# Patient Record
Sex: MALE | Race: WHITE | NOT HISPANIC OR LATINO | Employment: OTHER | URBAN - METROPOLITAN AREA
[De-identification: names, ages, dates, MRNs, and addresses within clinical notes are randomized per-mention and may not be internally consistent; named-entity substitution may affect disease eponyms.]

---

## 2020-12-24 ENCOUNTER — HOSPITAL ENCOUNTER (EMERGENCY)
Facility: HOSPITAL | Age: 60
Discharge: HOME | End: 2020-12-24
Attending: EMERGENCY MEDICINE

## 2020-12-24 VITALS
OXYGEN SATURATION: 95 % | TEMPERATURE: 99 F | BODY MASS INDEX: 24.25 KG/M2 | HEART RATE: 76 BPM | SYSTOLIC BLOOD PRESSURE: 184 MMHG | WEIGHT: 160 LBS | DIASTOLIC BLOOD PRESSURE: 94 MMHG | HEIGHT: 68 IN | RESPIRATION RATE: 18 BRPM

## 2020-12-24 DIAGNOSIS — S05.02XA ABRASION OF LEFT CORNEA, INITIAL ENCOUNTER: Primary | ICD-10-CM

## 2020-12-24 DIAGNOSIS — Z77.098 CHEMICAL EXPOSURE: ICD-10-CM

## 2020-12-24 DIAGNOSIS — I10 HYPERTENSION: ICD-10-CM

## 2020-12-24 DIAGNOSIS — T78.40XA ALLERGIC REACTION, INITIAL ENCOUNTER: ICD-10-CM

## 2020-12-24 DIAGNOSIS — S05.01XA ABRASION OF RIGHT CORNEA, INITIAL ENCOUNTER: ICD-10-CM

## 2020-12-24 DIAGNOSIS — H10.213 ACUTE CHEMICAL CONJUNCTIVITIS OF BOTH EYES: ICD-10-CM

## 2020-12-24 DIAGNOSIS — L03.90 CELLULITIS, UNSPECIFIED CELLULITIS SITE: ICD-10-CM

## 2020-12-24 DIAGNOSIS — Z91.89 HISTORY OF DRUG OVERDOSE: ICD-10-CM

## 2020-12-24 LAB
ALBUMIN SERPL-MCNC: 3.3 G/DL (ref 3.3–5.5)
ALP SERPL-CCNC: 67 U/L (ref 42–141)
BILIRUB SERPL-MCNC: 0.5 MG/DL (ref 0.2–1.6)
BUN SERPL-MCNC: 20 MG/DL (ref 7–22)
CALCIUM SERPL-MCNC: 9.6 MG/DL (ref 8–10.3)
CHLORIDE SERPL-SCNC: 106 MMOL/L (ref 98–108)
CREAT SERPL-MCNC: 1 MG/DL (ref 0.6–1.2)
GLUCOSE SERPL-MCNC: 127 MG/DL (ref 73–118)
POC ALT (SGPT): 32 U/L (ref 10–47)
POC AST (SGOT): 48 U/L (ref 11–38)
POC B-TYPE NATRIURETIC PEPTIDE: 75.3 PG/ML (ref 0–100)
POC CARDIAC TROPONIN I: 0.01 NG/ML
POC PTINR: 1 (ref 0.9–1.2)
POC PTWBT: 12.2 SEC (ref 9.7–14.3)
POC TCO2: 28 MMOL/L (ref 18–33)
POTASSIUM BLD-SCNC: 4 MMOL/L (ref 3.6–5.1)
PROTEIN, POC: 6.7 G/DL (ref 6.4–8.1)
SAMPLE: NORMAL
SAMPLE: NORMAL
SODIUM BLD-SCNC: 140 MMOL/L (ref 128–145)

## 2020-12-24 PROCEDURE — 96365 THER/PROPH/DIAG IV INF INIT: CPT | Mod: ER

## 2020-12-24 PROCEDURE — 93010 ELECTROCARDIOGRAM REPORT: CPT | Mod: ,,, | Performed by: INTERNAL MEDICINE

## 2020-12-24 PROCEDURE — 84484 ASSAY OF TROPONIN QUANT: CPT | Mod: ER

## 2020-12-24 PROCEDURE — 80053 COMPREHEN METABOLIC PANEL: CPT | Mod: ER

## 2020-12-24 PROCEDURE — 85025 COMPLETE CBC W/AUTO DIFF WBC: CPT | Mod: ER

## 2020-12-24 PROCEDURE — 85610 PROTHROMBIN TIME: CPT | Mod: ER

## 2020-12-24 PROCEDURE — 96375 TX/PRO/DX INJ NEW DRUG ADDON: CPT | Mod: ER

## 2020-12-24 PROCEDURE — 63600175 PHARM REV CODE 636 W HCPCS: Mod: ER | Performed by: EMERGENCY MEDICINE

## 2020-12-24 PROCEDURE — 99285 EMERGENCY DEPT VISIT HI MDM: CPT | Mod: 25,ER

## 2020-12-24 PROCEDURE — 25000003 PHARM REV CODE 250: Mod: ER | Performed by: EMERGENCY MEDICINE

## 2020-12-24 PROCEDURE — 93005 ELECTROCARDIOGRAM TRACING: CPT | Mod: ER

## 2020-12-24 PROCEDURE — 83880 ASSAY OF NATRIURETIC PEPTIDE: CPT | Mod: ER

## 2020-12-24 PROCEDURE — 90715 TDAP VACCINE 7 YRS/> IM: CPT | Mod: ER | Performed by: EMERGENCY MEDICINE

## 2020-12-24 PROCEDURE — 93010 EKG 12-LEAD: ICD-10-PCS | Mod: ,,, | Performed by: INTERNAL MEDICINE

## 2020-12-24 PROCEDURE — 90471 IMMUNIZATION ADMIN: CPT | Mod: ER | Performed by: EMERGENCY MEDICINE

## 2020-12-24 PROCEDURE — 25500020 PHARM REV CODE 255: Mod: ER | Performed by: EMERGENCY MEDICINE

## 2020-12-24 RX ORDER — FAMOTIDINE 10 MG/ML
20 INJECTION INTRAVENOUS
Status: COMPLETED | OUTPATIENT
Start: 2020-12-24 | End: 2020-12-24

## 2020-12-24 RX ORDER — IBUPROFEN 600 MG/1
600 TABLET ORAL EVERY 6 HOURS PRN
Qty: 20 TABLET | Refills: 0 | Status: ON HOLD | OUTPATIENT
Start: 2020-12-24 | End: 2021-03-15 | Stop reason: HOSPADM

## 2020-12-24 RX ORDER — PREDNISONE 20 MG/1
40 TABLET ORAL DAILY
Qty: 10 TABLET | Refills: 0 | Status: SHIPPED | OUTPATIENT
Start: 2020-12-24 | End: 2020-12-29

## 2020-12-24 RX ORDER — ERYTHROMYCIN 5 MG/G
OINTMENT OPHTHALMIC
Status: COMPLETED | OUTPATIENT
Start: 2020-12-24 | End: 2020-12-24

## 2020-12-24 RX ORDER — CLINDAMYCIN PHOSPHATE 150 MG/ML
600 INJECTION, SOLUTION INTRAVENOUS
Status: DISCONTINUED | OUTPATIENT
Start: 2020-12-24 | End: 2020-12-24

## 2020-12-24 RX ORDER — FAMOTIDINE 20 MG/1
20 TABLET, FILM COATED ORAL 2 TIMES DAILY
Qty: 20 TABLET | Refills: 0 | Status: SHIPPED | OUTPATIENT
Start: 2020-12-24 | End: 2023-08-07

## 2020-12-24 RX ORDER — DIPHENHYDRAMINE HCL 25 MG
25 CAPSULE ORAL EVERY 6 HOURS PRN
Qty: 20 CAPSULE | Refills: 0 | Status: ON HOLD | OUTPATIENT
Start: 2020-12-24 | End: 2021-03-15 | Stop reason: HOSPADM

## 2020-12-24 RX ORDER — ACETAMINOPHEN 500 MG
500 TABLET ORAL EVERY 6 HOURS PRN
Qty: 30 TABLET | Refills: 0 | Status: ON HOLD | OUTPATIENT
Start: 2020-12-24 | End: 2021-03-15 | Stop reason: HOSPADM

## 2020-12-24 RX ORDER — CLINDAMYCIN PHOSPHATE 600 MG/50ML
600 INJECTION, SOLUTION INTRAVENOUS
Status: COMPLETED | OUTPATIENT
Start: 2020-12-24 | End: 2020-12-24

## 2020-12-24 RX ORDER — PROPARACAINE HYDROCHLORIDE 5 MG/ML
2 SOLUTION/ DROPS OPHTHALMIC
Status: COMPLETED | OUTPATIENT
Start: 2020-12-24 | End: 2020-12-24

## 2020-12-24 RX ORDER — DIPHENHYDRAMINE HYDROCHLORIDE 50 MG/ML
25 INJECTION INTRAMUSCULAR; INTRAVENOUS
Status: COMPLETED | OUTPATIENT
Start: 2020-12-24 | End: 2020-12-24

## 2020-12-24 RX ORDER — METHYLPREDNISOLONE SOD SUCC 125 MG
125 VIAL (EA) INJECTION
Status: COMPLETED | OUTPATIENT
Start: 2020-12-24 | End: 2020-12-24

## 2020-12-24 RX ORDER — ERYTHROMYCIN 5 MG/G
OINTMENT OPHTHALMIC
Qty: 1 TUBE | Refills: 0 | Status: ON HOLD | OUTPATIENT
Start: 2020-12-24 | End: 2021-03-15 | Stop reason: HOSPADM

## 2020-12-24 RX ORDER — NALOXONE HYDROCHLORIDE 1 MG/ML
INJECTION INTRAMUSCULAR; INTRAVENOUS; SUBCUTANEOUS
Qty: 2 ML | Refills: 1 | Status: SHIPPED | OUTPATIENT
Start: 2020-12-24

## 2020-12-24 RX ORDER — CLINDAMYCIN HYDROCHLORIDE 150 MG/1
300 CAPSULE ORAL 4 TIMES DAILY
Qty: 80 CAPSULE | Refills: 0 | Status: SHIPPED | OUTPATIENT
Start: 2020-12-24 | End: 2021-01-03

## 2020-12-24 RX ADMIN — METHYLPREDNISOLONE SODIUM SUCCINATE 125 MG: 125 INJECTION, POWDER, FOR SOLUTION INTRAMUSCULAR; INTRAVENOUS at 12:12

## 2020-12-24 RX ADMIN — FLUORESCEIN SODIUM 1 EACH: 1 STRIP OPHTHALMIC at 02:12

## 2020-12-24 RX ADMIN — IOHEXOL 100 ML: 350 INJECTION, SOLUTION INTRAVENOUS at 01:12

## 2020-12-24 RX ADMIN — ERYTHROMYCIN 1 INCH: 5 OINTMENT OPHTHALMIC at 02:12

## 2020-12-24 RX ADMIN — DIPHENHYDRAMINE HYDROCHLORIDE 25 MG: 50 INJECTION INTRAMUSCULAR; INTRAVENOUS at 12:12

## 2020-12-24 RX ADMIN — CLOSTRIDIUM TETANI TOXOID ANTIGEN (FORMALDEHYDE INACTIVATED), CORYNEBACTERIUM DIPHTHERIAE TOXOID ANTIGEN (FORMALDEHYDE INACTIVATED), BORDETELLA PERTUSSIS TOXOID ANTIGEN (GLUTARALDEHYDE INACTIVATED), BORDETELLA PERTUSSIS FILAMENTOUS HEMAGGLUTININ ANTIGEN (FORMALDEHYDE INACTIVATED), BORDETELLA PERTUSSIS PERTACTIN ANTIGEN, AND BORDETELLA PERTUSSIS FIMBRIAE 2/3 ANTIGEN 0.5 ML: 5; 2; 2.5; 5; 3; 5 INJECTION, SUSPENSION INTRAMUSCULAR at 12:12

## 2020-12-24 RX ADMIN — FAMOTIDINE 20 MG: 10 INJECTION INTRAVENOUS at 12:12

## 2020-12-24 RX ADMIN — PROPARACAINE HYDROCHLORIDE 2 DROP: 5 SOLUTION/ DROPS OPHTHALMIC at 12:12

## 2020-12-24 RX ADMIN — CLINDAMYCIN IN 5 PERCENT DEXTROSE 600 MG: 12 INJECTION, SOLUTION INTRAVENOUS at 03:12

## 2020-12-24 NOTE — Clinical Note
"Vivek "Bonynereida Whitman was seen and treated in our emergency department on 12/24/2020.  He may return to work on 12/26/2020.       If you have any questions or concerns, please don't hesitate to call.      Linnea Lin, DO"

## 2020-12-24 NOTE — ED NOTES
Contacted poison control,  3 , states due to time frame of accident to now, refer to optomology/medical for treatment.

## 2020-12-24 NOTE — ED PROVIDER NOTES
Encounter Date: 12/24/2020    SCRIBE #1 NOTE: I, July Charles, am scribing for, and in the presence of,  Dr. Lin. I have scribed the following portions of the note - Other sections scribed: HPI, ROS, PE.       History     Chief Complaint   Patient presents with    Eye Problem     reports possible paint thinner in right eye 2 days ago, and right  eye swelling, woke this am to both eyes with severe swelling and irritation- left eye jswollen shut     Vivek Whitman is a 60 y.o. male who presents to the ED for evaluation of bilateral eye pain, facial swelling, and skin irritation for 2 days, worsening yesterday morning. Patient reports he accidentally splashed paint thinner into his eyes 2 days ago. Endorses blurred vision, facial pain, and facial swelling. Denies a hx of hypertension. Denies throat tightness, chest pain, or SOB. Tetanus is not UTD.    The history is provided by the patient. No  was used.     Review of patient's allergies indicates:  No Known Allergies  History reviewed. No pertinent past medical history.  Past Surgical History:   Procedure Laterality Date    HERNIA REPAIR       History reviewed. No pertinent family history.  Social History     Tobacco Use    Smoking status: Current Every Day Smoker     Packs/day: 1.00     Types: Cigarettes   Substance Use Topics    Alcohol use: Yes     Alcohol/week: 12.0 standard drinks     Types: 12 Cans of beer per week    Drug use: No     Review of Systems   Constitutional: Negative for chills and fever.   HENT: Positive for facial swelling (and pain). Negative for sore throat.    Eyes: Positive for pain (and swelling), redness, itching and visual disturbance.   Respiratory: Negative for shortness of breath.    Cardiovascular: Negative for chest pain.   Gastrointestinal: Negative for nausea.   Genitourinary: Negative for dysuria.   Musculoskeletal: Negative for back pain.   Skin: Negative for rash.   Neurological: Negative for weakness and  numbness.   Hematological: Does not bruise/bleed easily.   All other systems reviewed and are negative.      Physical Exam     Initial Vitals [12/24/20 1209]   BP Pulse Resp Temp SpO2   (!) 192/95 70 19 99.1 °F (37.3 °C) 96 %      MAP       --         Patient gave consent to have physical exam performed.    Physical Exam    Nursing note and vitals reviewed.  Constitutional: He appears well-developed and well-nourished.   HENT:   Head: Normocephalic and atraumatic.   Right Ear: External ear normal.   Left Ear: External ear normal.   Nose: Nose normal.   Mouth/Throat: Oropharynx is clear and moist.   Eyes: EOM are normal. Pupils are equal, round, and reactive to light. Right eye exhibits discharge. No foreign body present in the right eye. Left eye exhibits discharge. No foreign body present in the left eye. Right conjunctiva is injected. Left conjunctiva is injected.   Slit lamp exam:       The right eye shows fluorescein uptake.        The left eye shows fluorescein uptake.       Neck: Normal range of motion. Neck supple.   Cardiovascular: Normal rate, regular rhythm and normal heart sounds. Exam reveals no gallop and no friction rub.    No murmur heard.  Pulmonary/Chest: Breath sounds normal. No respiratory distress. He has no wheezes. He has no rhonchi. He has no rales.   Abdominal: Soft. Bowel sounds are normal. There is no abdominal tenderness. There is no rebound and no guarding.   Musculoskeletal: Normal range of motion. No tenderness or edema.   Neurological: He is alert and oriented to person, place, and time. He has normal strength. No cranial nerve deficit.   Skin: Skin is warm and dry. Capillary refill takes less than 2 seconds. No rash noted.   Psychiatric: He has a normal mood and affect. His behavior is normal.                 ED Course   Procedures  Labs Reviewed   POCT CMP - Abnormal; Notable for the following components:       Result Value    AST (SGOT), POC 48 (*)     POC Glucose 127 (*)     All  "other components within normal limits   TROPONIN ISTAT   POCT CBC   POCT CMP   POCT PROTIME-INR   POCT TROPONIN   POCT B-TYPE NATRIURETIC PEPTIDE (BNP)   POCT B-TYPE NATRIURETIC PEPTIDE (BNP)   ISTAT PROCEDURE         EKG Readings: (Independently Interpreted)   No STEMI. Rate of 72. Normal Sinus Rhythm. Normal Axis. Abnormal EKG. LVH Appreciated. QTc normal at 475. No prior EKG for comparison.       Imaging Results          X-Ray Chest PA And Lateral (Final result)  Result time 12/24/20 13:58:36    Final result by Lobo Tripathi MD (12/24/20 13:58:36)                 Impression:      No acute cardiopulmonary finding.      Electronically signed by: Lobo Tripathi MD  Date:    12/24/2020  Time:    13:58             Narrative:    EXAMINATION:  XR CHEST PA AND LATERAL    CLINICAL HISTORY:  Provided history is "Hypertension;  ".    TECHNIQUE:  Frontal and lateral views of the chest were performed.    COMPARISON:  None.    FINDINGS:  Cardiac silhouette is not enlarged.  Atherosclerotic calcifications overlie the aortic arch.  No focal consolidation.  No sizable pleural effusion.  No pneumothorax.  Slight scoliotic curvature of the thoracic spine.                               CT Maxillofacial With Contrast (Final result)  Result time 12/24/20 14:03:17    Final result by Girish Arauz MD (12/24/20 14:03:17)                 Impression:      1. Marked soft tissue edema/induration involving the soft tissues of the face most prominently involving the soft tissues of the right cheek and left periorbital regions.  No findings to suggest focal organized fluid collection or postseptal involvement.  2. Sinus disease  3. 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  4. Please see above for additional findings.      Electronically signed by: Girish Arauz MD  Date:    12/24/2020  Time:    14:03             Narrative:    EXAMINATION:  CT MAXILLOFACIAL WITH " CONTRAST    CLINICAL HISTORY:  Facial trauma;Patient spilled paint thinner on his face and eyes.  now face is red swollen and painful;    TECHNIQUE:  Axial images of the maxillofacial region were obtained at 2.5 mm intervals following administration of 75 cc omni 350 IV contrast.  Coronal and sagittal reformatted images were reviewed.    COMPARISON:  None    FINDINGS:  There is mucous membrane thickening and aerated secretions within the bilateral maxillary sinuses.  The bilateral orbital walls and maxillary sinus walls are intact.  The zygomatic arches are intact.  No acute displaced fracture or dislocation of the maxillofacial region.  There is remote appearing fracture with regions of suspected chronic nonunion involving the left mandibular ramus, correlation is advised.  Degenerative changes are noted of the cervical spine.  There is sequela of chronic microvascular ischemic change and senescent change involving the visualized brain parenchyma.  The intracranial anterior and posterior circulation appears patent.  The major arterial vasculature of the neck is patent noting atherosclerotic calcification.  The left vertebral artery is dominant.    The parotid glands and remaining visualized salivary glands are unremarkable.  There is diffuse subcutaneous edema involving the soft tissues of the face, particularly prominent involving the soft tissues of the bilateral cheeks, right greater than left.  No underlying focal organized fluid collection.  There is marked left periorbital soft tissue induration/thickening primarily involving the left infraorbital tissues.  The bilateral globes are unremarkable.  No postseptal edema or intraconal abnormalities.  The airway is patent.                                 Medical Decision Making:   History:   Old Medical Records: I decided to obtain old medical records.  Independently Interpreted Test(s):   I have ordered and independently interpreted X-rays - see prior notes.  I  have ordered and independently interpreted EKG Reading(s) - see prior notes  Clinical Tests:   Lab Tests: Ordered and Reviewed  Radiological Study: Ordered and Reviewed  Medical Tests: Ordered and Reviewed  Chief complaint: bilateral eye pain, swelling, and irritation for 2 days s/p accidentally splashing paint thinner into his eyes.  Differential diagnosis: Corneal abrasion, cellulitis, allergic reaction, eye pain  Consult poison Control no recommendations secondary to greater than 48 hr post exposure.  Treatment in the ED: PE, Tdap vaccine, Diphenhydramine, Methylprednisolone, Fluorescein ophthalmic strip, Proparacaine solution, Famotidine, Clindamycin, Erythromycin, Iohexol.  Patient reports feeling better after treatment in the ER.      Discussed treatment, prescriptions, labs, and imaging results.    I spoke with Opthalmologist Dr. Mills and discussed patient's presentation, past medical history, physical exam, labs, radiology results, vital signs, and ED course. Dr. Mills recommends patient take Erythromycin 3 times a day and that he follows-up with Opthalmology on Monday.     Discharge home with Ibuprofen, Tylenol, Cleocin, Benadryl, Pepcid, Deltasone, Romycin, Narcan.  Fill and take prescriptions as directed.  Return to the ED if symptoms worsen or do not resolve.   Answered questions and discussed discharge plan.    Patient feels better and is ready for discharge.  Follow up with PCP/specialist in 1 day.            Scribe Attestation:   Scribe #1: I performed the above scribed service and the documentation accurately describes the services I performed. I attest to the accuracy of the note.     I, Dr. Linnea Lin, personally performed the services described in this documentation. This document was produced by a scribe under my direction and in my presence. All medical record entries made by the scribe were at my direction and in my presence.  I have reviewed the chart and agree that the record  reflects my personal performance and is accurate and complete. Linnea Lin DO.     12/24/2020 6:49 PM                    Clinical Impression:     ICD-10-CM ICD-9-CM   1. Abrasion of left cornea, initial encounter  S05.02XA 918.1   2. Hypertension  I10 401.9   3. Abrasion of right cornea, initial encounter  S05.01XA 918.1   4. Acute chemical conjunctivitis of both eyes  H10.213 372.06   5. Chemical exposure  Z77.098 V87.2   6. Allergic reaction, initial encounter  T78.40XA 995.3   7. Cellulitis, unspecified cellulitis site  L03.90 682.9   8. History of drug overdose  Z91.89 V15.6                          ED Disposition Condition    Discharge Stable        ED Prescriptions     Medication Sig Dispense Start Date End Date Auth. Provider    ibuprofen (ADVIL,MOTRIN) 600 MG tablet Take 1 tablet (600 mg total) by mouth every 6 (six) hours as needed for Pain (Take with food as needed for mild-to-moderate pain). 20 tablet 12/24/2020  Linnea Lin DO    acetaminophen (TYLENOL) 500 MG tablet Take 1 tablet (500 mg total) by mouth every 6 (six) hours as needed for Pain. 30 tablet 12/24/2020  Linnea Lin DO    clindamycin (CLEOCIN) 150 MG capsule Take 2 capsules (300 mg total) by mouth 4 (four) times daily. for 10 days 80 capsule 12/24/2020 1/3/2021 Linnea Lin DO    diphenhydrAMINE (BENADRYL) 25 mg capsule Take 1 capsule (25 mg total) by mouth every 6 (six) hours as needed for Itching or Allergies. 20 capsule 12/24/2020  Linnea Lin DO    famotidine (PEPCID) 20 MG tablet Take 1 tablet (20 mg total) by mouth 2 (two) times daily. 20 tablet 12/24/2020 12/24/2021 Linnea Lin DO    predniSONE (DELTASONE) 20 MG tablet Take 2 tablets (40 mg total) by mouth once daily. for 5 days 10 tablet 12/24/2020 12/29/2020 Linnea Lin DO    erythromycin (ROMYCIN) ophthalmic ointment Place a 1/2 inch ribbon of ointment into the lower eyelid. 1 Tube 12/24/2020  Linnea Lni DO    naloxone (NARCAN) 1 mg/mL injection 2 mg (1 mg per nostril)  by Nasal route as needed for opioid overdose; may repeat in 3 to 5 minutes if not effective. Call 911 2 mL 12/24/2020  Linnea Lin DO        Follow-up Information     Follow up With Specialties Details Why Contact Info    Chris Hayes MD Ophthalmology Schedule an appointment as soon as possible for a visit in 1 day Follow-up for further evaluation of her right eye pain and corneal abrasion [address] [facility]  [place] [address]  [phone]      [facility] Emergency Medicine  Go to [facility] emergency department on [facility] if symptoms worsen or do not resolve [facility] [facility] [address] [phone]    [facility]  Schedule an appointment as soon as possible for a visit in 1 day Please establish a primary care physician [address] [place] [address]  [phone]                                         Linnea Lin DO  12/24/20 0663

## 2021-03-13 ENCOUNTER — HOSPITAL ENCOUNTER (INPATIENT)
Facility: HOSPITAL | Age: 61
LOS: 2 days | Discharge: HOME | DRG: 305 | End: 2021-03-15
Attending: EMERGENCY MEDICINE | Admitting: STUDENT IN AN ORGANIZED HEALTH CARE EDUCATION/TRAINING PROGRAM
Payer: MEDICAID

## 2021-03-13 DIAGNOSIS — I16.1 HYPERTENSIVE EMERGENCY: ICD-10-CM

## 2021-03-13 DIAGNOSIS — R42 DIZZINESS: ICD-10-CM

## 2021-03-13 DIAGNOSIS — I10 UNCONTROLLED HYPERTENSION: ICD-10-CM

## 2021-03-13 DIAGNOSIS — I63.9 CEREBROVASCULAR ACCIDENT (CVA), UNSPECIFIED MECHANISM: ICD-10-CM

## 2021-03-13 DIAGNOSIS — I16.0 HYPERTENSIVE URGENCY: Primary | ICD-10-CM

## 2021-03-13 PROBLEM — Z87.898 HISTORY OF ALCOHOL USE: Status: RESOLVED | Noted: 2021-03-13 | Resolved: 2021-03-13

## 2021-03-13 PROBLEM — Z87.898 HISTORY OF ALCOHOL USE: Status: ACTIVE | Noted: 2021-03-13

## 2021-03-13 PROBLEM — Z72.0 TOBACCO ABUSE: Status: ACTIVE | Noted: 2021-03-13

## 2021-03-13 PROBLEM — F11.11 HISTORY OF HEROIN ABUSE: Status: ACTIVE | Noted: 2021-03-13

## 2021-03-13 PROBLEM — F11.11 HISTORY OF HEROIN ABUSE: Status: RESOLVED | Noted: 2021-03-13 | Resolved: 2021-03-13

## 2021-03-13 LAB
ALBUMIN SERPL-MCNC: 3.6 G/DL (ref 3.3–5.5)
ALP SERPL-CCNC: 65 U/L (ref 42–141)
AMPHET+METHAMPHET UR QL: NEGATIVE
BARBITURATES UR QL SCN>200 NG/ML: NEGATIVE
BENZODIAZ UR QL SCN>200 NG/ML: NEGATIVE
BILIRUB SERPL-MCNC: 0.5 MG/DL (ref 0.2–1.6)
BILIRUBIN, POC UA: NEGATIVE
BLOOD, POC UA: NEGATIVE
BUN SERPL-MCNC: 20 MG/DL (ref 7–22)
BZE UR QL SCN: NEGATIVE
CALCIUM SERPL-MCNC: 9.7 MG/DL (ref 8–10.3)
CANNABINOIDS UR QL SCN: NEGATIVE
CHLORIDE SERPL-SCNC: 104 MMOL/L (ref 98–108)
CLARITY, POC UA: CLEAR
COLOR, POC UA: YELLOW
CREAT SERPL-MCNC: 1 MG/DL (ref 0.6–1.2)
CREAT UR-MCNC: 109.3 MG/DL (ref 23–375)
CTP QC/QA: YES
ERYTHROCYTE [DISTWIDTH] IN BLOOD BY AUTOMATED COUNT: 14.7 % (ref 11.5–14.5)
ETHANOL SERPL-MCNC: <10 MG/DL
GLUCOSE SERPL-MCNC: 102 MG/DL (ref 73–118)
GLUCOSE, POC UA: NEGATIVE
HCT VFR BLD AUTO: 41.9 % (ref 40–54)
HCT VFR BLD AUTO: 45.3 % (ref 40–54)
HGB BLD-MCNC: 14.4 G/DL (ref 14–18)
HGB BLD-MCNC: 15.3 G/DL (ref 14–18)
KETONES, POC UA: NEGATIVE
LEUKOCYTE EST, POC UA: NEGATIVE
MCH RBC QN AUTO: 32.4 PG (ref 27–31)
MCHC RBC AUTO-ENTMCNC: 34.4 G/DL (ref 32–36)
MCV RBC AUTO: 94 FL (ref 82–98)
METHADONE UR QL SCN>300 NG/ML: NEGATIVE
NITRITE, POC UA: NEGATIVE
OPIATES UR QL SCN: NEGATIVE
PCP UR QL SCN>25 NG/ML: NEGATIVE
PH UR STRIP: 6 [PH]
PLATELET # BLD AUTO: 112 K/UL (ref 150–350)
PMV BLD AUTO: 11.2 FL (ref 9.2–12.9)
POC ALT (SGPT): 24 U/L (ref 10–47)
POC AST (SGOT): 42 U/L (ref 11–38)
POC B-TYPE NATRIURETIC PEPTIDE: 111 PG/ML (ref 0–100)
POC CARDIAC TROPONIN I: 0.01 NG/ML
POC PTINR: 1 (ref 0.9–1.2)
POC PTWBT: 11.9 SEC (ref 9.7–14.3)
POC TCO2: 28 MMOL/L (ref 18–33)
POTASSIUM BLD-SCNC: 4.2 MMOL/L (ref 3.6–5.1)
PROTEIN, POC UA: ABNORMAL
PROTEIN, POC: 7.8 G/DL (ref 6.4–8.1)
RBC # BLD AUTO: 4.45 M/UL (ref 4.6–6.2)
SAMPLE: NORMAL
SAMPLE: NORMAL
SARS-COV-2 RDRP RESP QL NAA+PROBE: NEGATIVE
SODIUM BLD-SCNC: 140 MMOL/L (ref 128–145)
SPECIFIC GRAVITY, POC UA: >=1.03
TOXICOLOGY INFORMATION: NORMAL
TROPONIN I SERPL DL<=0.01 NG/ML-MCNC: 0.02 NG/ML (ref 0–0.03)
UROBILINOGEN, POC UA: 1 E.U./DL
WBC # BLD AUTO: 6.75 K/UL (ref 3.9–12.7)

## 2021-03-13 PROCEDURE — 99291 CRITICAL CARE FIRST HOUR: CPT | Mod: 25

## 2021-03-13 PROCEDURE — 85610 PROTHROMBIN TIME: CPT | Mod: ER

## 2021-03-13 PROCEDURE — 85018 HEMOGLOBIN: CPT | Performed by: INTERNAL MEDICINE

## 2021-03-13 PROCEDURE — 93005 ELECTROCARDIOGRAM TRACING: CPT | Mod: ER

## 2021-03-13 PROCEDURE — 82077 ASSAY SPEC XCP UR&BREATH IA: CPT | Performed by: EMERGENCY MEDICINE

## 2021-03-13 PROCEDURE — 84484 ASSAY OF TROPONIN QUANT: CPT | Mod: ER

## 2021-03-13 PROCEDURE — 80053 COMPREHEN METABOLIC PANEL: CPT | Mod: ER

## 2021-03-13 PROCEDURE — 81003 URINALYSIS AUTO W/O SCOPE: CPT | Mod: ER

## 2021-03-13 PROCEDURE — 80307 DRUG TEST PRSMV CHEM ANLYZR: CPT | Performed by: EMERGENCY MEDICINE

## 2021-03-13 PROCEDURE — U0002 COVID-19 LAB TEST NON-CDC: HCPCS | Mod: ER | Performed by: EMERGENCY MEDICINE

## 2021-03-13 PROCEDURE — 25000003 PHARM REV CODE 250: Mod: ER | Performed by: EMERGENCY MEDICINE

## 2021-03-13 PROCEDURE — 84484 ASSAY OF TROPONIN QUANT: CPT | Performed by: HOSPITALIST

## 2021-03-13 PROCEDURE — 85025 COMPLETE CBC W/AUTO DIFF WBC: CPT | Mod: ER

## 2021-03-13 PROCEDURE — 96374 THER/PROPH/DIAG INJ IV PUSH: CPT

## 2021-03-13 PROCEDURE — 25000003 PHARM REV CODE 250: Performed by: INTERNAL MEDICINE

## 2021-03-13 PROCEDURE — 63600175 PHARM REV CODE 636 W HCPCS: Mod: ER | Performed by: EMERGENCY MEDICINE

## 2021-03-13 PROCEDURE — 63600175 PHARM REV CODE 636 W HCPCS: Performed by: INTERNAL MEDICINE

## 2021-03-13 PROCEDURE — 83880 ASSAY OF NATRIURETIC PEPTIDE: CPT | Mod: ER

## 2021-03-13 PROCEDURE — 85027 COMPLETE CBC AUTOMATED: CPT | Performed by: HOSPITALIST

## 2021-03-13 PROCEDURE — 93010 ELECTROCARDIOGRAM REPORT: CPT | Mod: ,,, | Performed by: INTERNAL MEDICINE

## 2021-03-13 PROCEDURE — 85014 HEMATOCRIT: CPT | Performed by: INTERNAL MEDICINE

## 2021-03-13 PROCEDURE — 93010 EKG 12-LEAD: ICD-10-PCS | Mod: ,,, | Performed by: INTERNAL MEDICINE

## 2021-03-13 PROCEDURE — 25000003 PHARM REV CODE 250: Performed by: STUDENT IN AN ORGANIZED HEALTH CARE EDUCATION/TRAINING PROGRAM

## 2021-03-13 PROCEDURE — 20000000 HC ICU ROOM

## 2021-03-13 PROCEDURE — 36415 COLL VENOUS BLD VENIPUNCTURE: CPT | Performed by: INTERNAL MEDICINE

## 2021-03-13 RX ORDER — HYDRALAZINE HYDROCHLORIDE 20 MG/ML
5 INJECTION INTRAMUSCULAR; INTRAVENOUS
Status: DISCONTINUED | OUTPATIENT
Start: 2021-03-13 | End: 2021-03-13

## 2021-03-13 RX ORDER — NICARDIPINE HYDROCHLORIDE 0.2 MG/ML
5 INJECTION INTRAVENOUS CONTINUOUS
Status: DISCONTINUED | OUTPATIENT
Start: 2021-03-13 | End: 2021-03-14

## 2021-03-13 RX ORDER — SODIUM CHLORIDE 0.9 % (FLUSH) 0.9 %
10 SYRINGE (ML) INJECTION
Status: DISCONTINUED | OUTPATIENT
Start: 2021-03-13 | End: 2021-03-15 | Stop reason: HOSPADM

## 2021-03-13 RX ORDER — LABETALOL HYDROCHLORIDE 5 MG/ML
10 INJECTION, SOLUTION INTRAVENOUS
Status: COMPLETED | OUTPATIENT
Start: 2021-03-13 | End: 2021-03-13

## 2021-03-13 RX ORDER — ACETAMINOPHEN 325 MG/1
650 TABLET ORAL EVERY 4 HOURS PRN
Status: DISCONTINUED | OUTPATIENT
Start: 2021-03-13 | End: 2021-03-15 | Stop reason: HOSPADM

## 2021-03-13 RX ORDER — ENOXAPARIN SODIUM 100 MG/ML
40 INJECTION SUBCUTANEOUS EVERY 24 HOURS
Status: DISCONTINUED | OUTPATIENT
Start: 2021-03-13 | End: 2021-03-15 | Stop reason: HOSPADM

## 2021-03-13 RX ORDER — TALC
6 POWDER (GRAM) TOPICAL NIGHTLY PRN
Status: DISCONTINUED | OUTPATIENT
Start: 2021-03-13 | End: 2021-03-15 | Stop reason: HOSPADM

## 2021-03-13 RX ORDER — ONDANSETRON 2 MG/ML
4 INJECTION INTRAMUSCULAR; INTRAVENOUS EVERY 8 HOURS PRN
Status: DISCONTINUED | OUTPATIENT
Start: 2021-03-13 | End: 2021-03-15 | Stop reason: HOSPADM

## 2021-03-13 RX ORDER — MECLIZINE HYDROCHLORIDE 25 MG/1
25 TABLET ORAL
Status: COMPLETED | OUTPATIENT
Start: 2021-03-13 | End: 2021-03-13

## 2021-03-13 RX ORDER — NICARDIPINE HYDROCHLORIDE 0.2 MG/ML
5 INJECTION INTRAVENOUS CONTINUOUS
Status: DISCONTINUED | OUTPATIENT
Start: 2021-03-13 | End: 2021-03-13

## 2021-03-13 RX ORDER — ASPIRIN 325 MG
325 TABLET ORAL
Status: COMPLETED | OUTPATIENT
Start: 2021-03-13 | End: 2021-03-13

## 2021-03-13 RX ADMIN — LABETALOL HYDROCHLORIDE 10 MG: 5 INJECTION INTRAVENOUS at 04:03

## 2021-03-13 RX ADMIN — MECLIZINE HYDROCHLORIDE 25 MG: 25 TABLET ORAL at 07:03

## 2021-03-13 RX ADMIN — NICARDIPINE HYDROCHLORIDE 5 MG/HR: 0.2 INJECTION, SOLUTION INTRAVENOUS at 08:03

## 2021-03-13 RX ADMIN — NICARDIPINE HYDROCHLORIDE 5 MG/HR: 0.2 INJECTION, SOLUTION INTRAVENOUS at 09:03

## 2021-03-13 RX ADMIN — ENOXAPARIN SODIUM 40 MG: 40 INJECTION SUBCUTANEOUS at 09:03

## 2021-03-13 RX ADMIN — ASPIRIN 325 MG ORAL TABLET 325 MG: 325 PILL ORAL at 03:03

## 2021-03-14 PROBLEM — F10.10 ALCOHOL ABUSE: Status: ACTIVE | Noted: 2021-03-14

## 2021-03-14 PROBLEM — Z86.73 HISTORY OF STROKE: Status: ACTIVE | Noted: 2021-03-14

## 2021-03-14 PROBLEM — I16.0 HYPERTENSIVE URGENCY: Status: ACTIVE | Noted: 2021-03-13

## 2021-03-14 LAB
ALBUMIN SERPL BCP-MCNC: 3.3 G/DL (ref 3.5–5.2)
ALP SERPL-CCNC: 62 U/L (ref 55–135)
ALT SERPL W/O P-5'-P-CCNC: 18 U/L (ref 10–44)
ANION GAP SERPL CALC-SCNC: 9 MMOL/L (ref 8–16)
AST SERPL-CCNC: 29 U/L (ref 10–40)
BILIRUB SERPL-MCNC: 0.7 MG/DL (ref 0.1–1)
BUN SERPL-MCNC: 13 MG/DL (ref 8–23)
CALCIUM SERPL-MCNC: 8.6 MG/DL (ref 8.7–10.5)
CHLORIDE SERPL-SCNC: 103 MMOL/L (ref 95–110)
CO2 SERPL-SCNC: 26 MMOL/L (ref 23–29)
CREAT SERPL-MCNC: 0.8 MG/DL (ref 0.5–1.4)
EST. GFR  (AFRICAN AMERICAN): >60 ML/MIN/1.73 M^2
EST. GFR  (NON AFRICAN AMERICAN): >60 ML/MIN/1.73 M^2
GLUCOSE SERPL-MCNC: 99 MG/DL (ref 70–110)
POTASSIUM SERPL-SCNC: 3.7 MMOL/L (ref 3.5–5.1)
PROT SERPL-MCNC: 6.8 G/DL (ref 6–8.4)
SODIUM SERPL-SCNC: 138 MMOL/L (ref 136–145)
TSH SERPL DL<=0.005 MIU/L-ACNC: 3.15 UIU/ML (ref 0.4–4)

## 2021-03-14 PROCEDURE — 25000003 PHARM REV CODE 250: Performed by: STUDENT IN AN ORGANIZED HEALTH CARE EDUCATION/TRAINING PROGRAM

## 2021-03-14 PROCEDURE — 84443 ASSAY THYROID STIM HORMONE: CPT | Performed by: INTERNAL MEDICINE

## 2021-03-14 PROCEDURE — 36415 COLL VENOUS BLD VENIPUNCTURE: CPT | Performed by: INTERNAL MEDICINE

## 2021-03-14 PROCEDURE — 25000003 PHARM REV CODE 250: Performed by: INTERNAL MEDICINE

## 2021-03-14 PROCEDURE — 11000001 HC ACUTE MED/SURG PRIVATE ROOM

## 2021-03-14 PROCEDURE — 63600175 PHARM REV CODE 636 W HCPCS: Performed by: STUDENT IN AN ORGANIZED HEALTH CARE EDUCATION/TRAINING PROGRAM

## 2021-03-14 PROCEDURE — 80053 COMPREHEN METABOLIC PANEL: CPT | Performed by: INTERNAL MEDICINE

## 2021-03-14 PROCEDURE — 63600175 PHARM REV CODE 636 W HCPCS: Performed by: INTERNAL MEDICINE

## 2021-03-14 PROCEDURE — 83036 HEMOGLOBIN GLYCOSYLATED A1C: CPT | Performed by: HOSPITALIST

## 2021-03-14 PROCEDURE — 36415 COLL VENOUS BLD VENIPUNCTURE: CPT | Performed by: HOSPITALIST

## 2021-03-14 RX ORDER — CLONIDINE HYDROCHLORIDE 0.1 MG/1
0.1 TABLET ORAL EVERY 4 HOURS PRN
Status: DISCONTINUED | OUTPATIENT
Start: 2021-03-14 | End: 2021-03-15 | Stop reason: HOSPADM

## 2021-03-14 RX ORDER — LISINOPRIL 20 MG/1
40 TABLET ORAL DAILY
Status: DISCONTINUED | OUTPATIENT
Start: 2021-03-14 | End: 2021-03-15 | Stop reason: HOSPADM

## 2021-03-14 RX ORDER — FOLIC ACID 1 MG/1
1 TABLET ORAL DAILY
Status: DISCONTINUED | OUTPATIENT
Start: 2021-03-14 | End: 2021-03-15 | Stop reason: HOSPADM

## 2021-03-14 RX ORDER — MECLIZINE HYDROCHLORIDE 25 MG/1
25 TABLET ORAL 3 TIMES DAILY PRN
Status: DISCONTINUED | OUTPATIENT
Start: 2021-03-14 | End: 2021-03-15 | Stop reason: HOSPADM

## 2021-03-14 RX ORDER — LABETALOL HYDROCHLORIDE 5 MG/ML
10 INJECTION, SOLUTION INTRAVENOUS EVERY 4 HOURS PRN
Status: DISCONTINUED | OUTPATIENT
Start: 2021-03-14 | End: 2021-03-15 | Stop reason: HOSPADM

## 2021-03-14 RX ORDER — HYDROXYZINE PAMOATE 25 MG/1
50 CAPSULE ORAL EVERY 8 HOURS PRN
Status: DISCONTINUED | OUTPATIENT
Start: 2021-03-14 | End: 2021-03-15 | Stop reason: HOSPADM

## 2021-03-14 RX ORDER — NAPROXEN SODIUM 220 MG/1
81 TABLET, FILM COATED ORAL DAILY
Status: DISCONTINUED | OUTPATIENT
Start: 2021-03-15 | End: 2021-03-15 | Stop reason: HOSPADM

## 2021-03-14 RX ORDER — LISINOPRIL 40 MG/1
40 TABLET ORAL DAILY
Qty: 30 TABLET | Refills: 1 | Status: CANCELLED | OUTPATIENT
Start: 2021-03-15 | End: 2022-03-15

## 2021-03-14 RX ORDER — NICOTINE 7MG/24HR
1 PATCH, TRANSDERMAL 24 HOURS TRANSDERMAL DAILY
Status: DISCONTINUED | OUTPATIENT
Start: 2021-03-15 | End: 2021-03-15 | Stop reason: HOSPADM

## 2021-03-14 RX ORDER — MECLIZINE HYDROCHLORIDE 25 MG/1
25 TABLET ORAL 3 TIMES DAILY PRN
Qty: 30 TABLET | Refills: 1 | Status: CANCELLED | OUTPATIENT
Start: 2021-03-14

## 2021-03-14 RX ORDER — NICOTINE 7MG/24HR
1 PATCH, TRANSDERMAL 24 HOURS TRANSDERMAL DAILY
Qty: 30 PATCH | Refills: 1 | Status: CANCELLED | OUTPATIENT
Start: 2021-03-15

## 2021-03-14 RX ORDER — TALC
6 POWDER (GRAM) TOPICAL NIGHTLY
Status: DISCONTINUED | OUTPATIENT
Start: 2021-03-14 | End: 2021-03-15 | Stop reason: HOSPADM

## 2021-03-14 RX ORDER — GABAPENTIN 400 MG/1
800 CAPSULE ORAL 3 TIMES DAILY
Status: DISCONTINUED | OUTPATIENT
Start: 2021-03-14 | End: 2021-03-14

## 2021-03-14 RX ORDER — LORAZEPAM 0.5 MG/1
2 TABLET ORAL EVERY 6 HOURS PRN
Status: DISCONTINUED | OUTPATIENT
Start: 2021-03-14 | End: 2021-03-15 | Stop reason: HOSPADM

## 2021-03-14 RX ORDER — LORAZEPAM 0.5 MG/1
1 TABLET ORAL EVERY 6 HOURS PRN
Status: DISCONTINUED | OUTPATIENT
Start: 2021-03-14 | End: 2021-03-15 | Stop reason: HOSPADM

## 2021-03-14 RX ORDER — MUPIROCIN 20 MG/G
OINTMENT TOPICAL 2 TIMES DAILY
Status: DISCONTINUED | OUTPATIENT
Start: 2021-03-14 | End: 2021-03-15 | Stop reason: HOSPADM

## 2021-03-14 RX ORDER — AMLODIPINE BESYLATE 5 MG/1
5 TABLET ORAL ONCE
Status: COMPLETED | OUTPATIENT
Start: 2021-03-14 | End: 2021-03-14

## 2021-03-14 RX ADMIN — ACETAMINOPHEN 650 MG: 325 TABLET ORAL at 08:03

## 2021-03-14 RX ADMIN — THERA TABS 1 TABLET: TAB at 02:03

## 2021-03-14 RX ADMIN — LISINOPRIL 40 MG: 20 TABLET ORAL at 09:03

## 2021-03-14 RX ADMIN — FOLIC ACID 1 MG: 1 TABLET ORAL at 02:03

## 2021-03-14 RX ADMIN — THIAMINE HYDROCHLORIDE 500 MG: 100 INJECTION, SOLUTION INTRAMUSCULAR; INTRAVENOUS at 09:03

## 2021-03-14 RX ADMIN — MUPIROCIN: 20 OINTMENT TOPICAL at 08:03

## 2021-03-14 RX ADMIN — MELATONIN TAB 3 MG 6 MG: 3 TAB at 08:03

## 2021-03-14 RX ADMIN — AMLODIPINE BESYLATE 5 MG: 5 TABLET ORAL at 07:03

## 2021-03-14 RX ADMIN — ENOXAPARIN SODIUM 40 MG: 40 INJECTION SUBCUTANEOUS at 04:03

## 2021-03-14 RX ADMIN — HYDROXYZINE PAMOATE 50 MG: 25 CAPSULE ORAL at 08:03

## 2021-03-15 VITALS
WEIGHT: 166.88 LBS | BODY MASS INDEX: 25.29 KG/M2 | OXYGEN SATURATION: 93 % | RESPIRATION RATE: 18 BRPM | HEIGHT: 68 IN | HEART RATE: 65 BPM | TEMPERATURE: 100 F | DIASTOLIC BLOOD PRESSURE: 70 MMHG | SYSTOLIC BLOOD PRESSURE: 143 MMHG

## 2021-03-15 LAB
ANION GAP SERPL CALC-SCNC: 7 MMOL/L (ref 8–16)
BUN SERPL-MCNC: 18 MG/DL (ref 8–23)
CALCIUM SERPL-MCNC: 8.7 MG/DL (ref 8.7–10.5)
CHLORIDE SERPL-SCNC: 105 MMOL/L (ref 95–110)
CHOLEST SERPL-MCNC: 130 MG/DL (ref 120–199)
CHOLEST/HDLC SERPL: 3.7 {RATIO} (ref 2–5)
CO2 SERPL-SCNC: 25 MMOL/L (ref 23–29)
CREAT SERPL-MCNC: 0.8 MG/DL (ref 0.5–1.4)
EST. GFR  (AFRICAN AMERICAN): >60 ML/MIN/1.73 M^2
EST. GFR  (NON AFRICAN AMERICAN): >60 ML/MIN/1.73 M^2
ESTIMATED AVG GLUCOSE: 100 MG/DL (ref 68–131)
GLUCOSE SERPL-MCNC: 81 MG/DL (ref 70–110)
HBA1C MFR BLD: 5.1 % (ref 4–5.6)
HDLC SERPL-MCNC: 35 MG/DL (ref 40–75)
HDLC SERPL: 26.9 % (ref 20–50)
LDLC SERPL CALC-MCNC: 75.4 MG/DL (ref 63–159)
NONHDLC SERPL-MCNC: 95 MG/DL
POTASSIUM SERPL-SCNC: 4.1 MMOL/L (ref 3.5–5.1)
SODIUM SERPL-SCNC: 137 MMOL/L (ref 136–145)
TRIGL SERPL-MCNC: 98 MG/DL (ref 30–150)

## 2021-03-15 PROCEDURE — 25000003 PHARM REV CODE 250: Performed by: HOSPITALIST

## 2021-03-15 PROCEDURE — 80061 LIPID PANEL: CPT | Performed by: STUDENT IN AN ORGANIZED HEALTH CARE EDUCATION/TRAINING PROGRAM

## 2021-03-15 PROCEDURE — S4991 NICOTINE PATCH NONLEGEND: HCPCS | Performed by: STUDENT IN AN ORGANIZED HEALTH CARE EDUCATION/TRAINING PROGRAM

## 2021-03-15 PROCEDURE — 36415 COLL VENOUS BLD VENIPUNCTURE: CPT | Performed by: STUDENT IN AN ORGANIZED HEALTH CARE EDUCATION/TRAINING PROGRAM

## 2021-03-15 PROCEDURE — 80048 BASIC METABOLIC PNL TOTAL CA: CPT | Performed by: STUDENT IN AN ORGANIZED HEALTH CARE EDUCATION/TRAINING PROGRAM

## 2021-03-15 PROCEDURE — 25000003 PHARM REV CODE 250: Performed by: STUDENT IN AN ORGANIZED HEALTH CARE EDUCATION/TRAINING PROGRAM

## 2021-03-15 RX ORDER — HYDROCHLOROTHIAZIDE 25 MG/1
25 TABLET ORAL DAILY
Qty: 30 TABLET | Refills: 1 | OUTPATIENT
Start: 2021-03-15 | End: 2023-08-07

## 2021-03-15 RX ORDER — LISINOPRIL 40 MG/1
40 TABLET ORAL DAILY
Qty: 30 TABLET | Refills: 1 | Status: SHIPPED | OUTPATIENT
Start: 2021-03-15 | End: 2023-08-07

## 2021-03-15 RX ORDER — MECLIZINE HYDROCHLORIDE 25 MG/1
25 TABLET ORAL 3 TIMES DAILY PRN
Qty: 30 TABLET | Refills: 1 | Status: SHIPPED | OUTPATIENT
Start: 2021-03-15 | End: 2023-08-07

## 2021-03-15 RX ORDER — NICOTINE 7MG/24HR
1 PATCH, TRANSDERMAL 24 HOURS TRANSDERMAL DAILY
Qty: 28 PATCH | Refills: 1 | Status: ON HOLD | OUTPATIENT
Start: 2021-03-15 | End: 2023-08-15 | Stop reason: SDUPTHER

## 2021-03-15 RX ORDER — NAPROXEN SODIUM 220 MG/1
81 TABLET, FILM COATED ORAL DAILY
Qty: 30 TABLET | Refills: 1 | Status: SHIPPED | OUTPATIENT
Start: 2021-03-15 | End: 2023-10-04

## 2021-03-15 RX ADMIN — NICOTINE 1 PATCH: 7 PATCH TRANSDERMAL at 08:03

## 2021-03-15 RX ADMIN — FOLIC ACID 1 MG: 1 TABLET ORAL at 08:03

## 2021-03-15 RX ADMIN — THERA TABS 1 TABLET: TAB at 08:03

## 2021-03-15 RX ADMIN — LORAZEPAM 1 MG: 0.5 TABLET ORAL at 12:03

## 2021-03-15 RX ADMIN — ASPIRIN 81 MG: 81 TABLET, CHEWABLE ORAL at 08:03

## 2021-03-15 RX ADMIN — LISINOPRIL 40 MG: 20 TABLET ORAL at 08:03

## 2021-03-17 ENCOUNTER — PATIENT OUTREACH (OUTPATIENT)
Dept: ADMINISTRATIVE | Facility: CLINIC | Age: 61
End: 2021-03-17

## 2021-05-10 RX ORDER — LISINOPRIL 40 MG/1
40 TABLET ORAL DAILY
Qty: 30 TABLET | Refills: 1 | Status: CANCELLED | OUTPATIENT
Start: 2021-05-10 | End: 2022-05-10

## 2023-08-06 ENCOUNTER — HOSPITAL ENCOUNTER (INPATIENT)
Facility: HOSPITAL | Age: 63
LOS: 9 days | Discharge: HOME | DRG: 872 | End: 2023-08-15
Attending: INTERNAL MEDICINE | Admitting: STUDENT IN AN ORGANIZED HEALTH CARE EDUCATION/TRAINING PROGRAM
Payer: MEDICAID

## 2023-08-06 DIAGNOSIS — R07.9 CHEST PAIN: ICD-10-CM

## 2023-08-06 DIAGNOSIS — F10.10 ALCOHOL ABUSE: ICD-10-CM

## 2023-08-06 DIAGNOSIS — I48.91 ATRIAL FIBRILLATION BY ELECTROCARDIOGRAM: ICD-10-CM

## 2023-08-06 DIAGNOSIS — R42 DIZZINESS: ICD-10-CM

## 2023-08-06 DIAGNOSIS — Z86.73 HISTORY OF STROKE: ICD-10-CM

## 2023-08-06 DIAGNOSIS — E86.0 DEHYDRATION: ICD-10-CM

## 2023-08-06 DIAGNOSIS — R65.10 SIRS (SYSTEMIC INFLAMMATORY RESPONSE SYNDROME): ICD-10-CM

## 2023-08-06 DIAGNOSIS — R53.81 DEBILITY: ICD-10-CM

## 2023-08-06 DIAGNOSIS — N17.9 AKI (ACUTE KIDNEY INJURY): Primary | ICD-10-CM

## 2023-08-06 DIAGNOSIS — F11.10 HEROIN ABUSE: Chronic | ICD-10-CM

## 2023-08-06 DIAGNOSIS — I48.0 PAF (PAROXYSMAL ATRIAL FIBRILLATION): ICD-10-CM

## 2023-08-06 DIAGNOSIS — Z72.0 TOBACCO ABUSE: ICD-10-CM

## 2023-08-06 DIAGNOSIS — I95.9 HYPOTENSION: ICD-10-CM

## 2023-08-06 DIAGNOSIS — I48.91 A-FIB: ICD-10-CM

## 2023-08-06 DIAGNOSIS — I48.91 ATRIAL FIBRILLATION, UNSPECIFIED TYPE: ICD-10-CM

## 2023-08-06 LAB
ALBUMIN SERPL-MCNC: 2.6 G/DL (ref 3.3–5.5)
ALLENS TEST: ABNORMAL
ALP SERPL-CCNC: 80 U/L (ref 42–141)
BILIRUB SERPL-MCNC: 0.5 MG/DL (ref 0.2–1.6)
BILIRUBIN, POC UA: ABNORMAL
BLOOD, POC UA: ABNORMAL
BUN SERPL-MCNC: 91 MG/DL (ref 7–22)
CALCIUM SERPL-MCNC: 9.2 MG/DL (ref 8–10.3)
CHLORIDE SERPL-SCNC: 89 MMOL/L (ref 98–108)
CLARITY, POC UA: CLEAR
COLOR, POC UA: YELLOW
CREAT SERPL-MCNC: 7.4 MG/DL (ref 0.6–1.2)
CTP QC/QA: YES
GLUCOSE SERPL-MCNC: 178 MG/DL (ref 73–118)
GLUCOSE, POC UA: NEGATIVE
HCO3 UR-SCNC: 27.2 MMOL/L (ref 24–28)
KETONES, POC UA: NEGATIVE
LDH SERPL L TO P-CCNC: 2.03 MMOL/L (ref 0.5–2.2)
LEUKOCYTE EST, POC UA: NEGATIVE
NITRITE, POC UA: NEGATIVE
PCO2 BLDA: 45.7 MMHG (ref 35–45)
PH SMN: 7.38 [PH] (ref 7.35–7.45)
PH UR STRIP: 5 [PH]
PO2 BLDA: 17 MMHG (ref 40–60)
POC ALT (SGPT): 77 U/L (ref 10–47)
POC AST (SGOT): 83 U/L (ref 11–38)
POC BE: 2 MMOL/L
POC CARDIAC TROPONIN I: 0.02 NG/ML (ref 0–0.08)
POC SATURATED O2: 24 % (ref 95–100)
POC TCO2: 27 MMOL/L (ref 18–33)
POC TCO2: 29 MMOL/L (ref 24–29)
POTASSIUM BLD-SCNC: 4.2 MMOL/L (ref 3.6–5.1)
PROTEIN, POC UA: ABNORMAL
PROTEIN, POC: 7.1 G/DL (ref 6.4–8.1)
SAMPLE: ABNORMAL
SAMPLE: NORMAL
SARS-COV-2 RDRP RESP QL NAA+PROBE: NEGATIVE
SITE: ABNORMAL
SODIUM BLD-SCNC: 135 MMOL/L (ref 128–145)
SPECIFIC GRAVITY, POC UA: >=1.03
UROBILINOGEN, POC UA: 2 E.U./DL

## 2023-08-06 PROCEDURE — 87635 SARS-COV-2 COVID-19 AMP PRB: CPT | Mod: ER | Performed by: INTERNAL MEDICINE

## 2023-08-06 PROCEDURE — 93010 ELECTROCARDIOGRAM REPORT: CPT | Mod: ,,, | Performed by: INTERNAL MEDICINE

## 2023-08-06 PROCEDURE — 93005 ELECTROCARDIOGRAM TRACING: CPT | Mod: ER

## 2023-08-06 PROCEDURE — 20000000 HC ICU ROOM

## 2023-08-06 PROCEDURE — 93010 EKG 12-LEAD: ICD-10-PCS | Mod: ,,, | Performed by: INTERNAL MEDICINE

## 2023-08-06 NOTE — Clinical Note
Diagnosis: LAM (acute kidney injury) [178692]   Admitting Provider:: IFRAH HANLEY [[id number]]   Future Attending Provider: IFRAH HANLEY [[id number]]   Reason for IP Medical Treatment  (Clinical interventions that can only be accomplished in the IP setting? ) :: LAM, hypotension   I certify that Inpatient services for greater than or equal to 2 midnights are medically necessary:: Yes   Plans for Post-Acute care--if anticipated (pick the single best option):: A. No post acute care anticipated at this time   Special Needs:: No Special Needs [1]

## 2023-08-07 PROBLEM — E66.811 CLASS 1 OBESITY WITH BODY MASS INDEX (BMI) OF 31.0 TO 31.9 IN ADULT: Status: ACTIVE | Noted: 2023-08-07

## 2023-08-07 PROBLEM — I48.91 A-FIB: Status: ACTIVE | Noted: 2023-08-07

## 2023-08-07 PROBLEM — E66.9 CLASS 1 OBESITY WITH BODY MASS INDEX (BMI) OF 31.0 TO 31.9 IN ADULT: Status: ACTIVE | Noted: 2023-08-07

## 2023-08-07 PROBLEM — D72.829 LEUKOCYTOSIS: Status: ACTIVE | Noted: 2023-08-07

## 2023-08-07 LAB
ALBUMIN SERPL BCP-MCNC: 2.2 G/DL (ref 3.5–5.2)
ALBUMIN SERPL BCP-MCNC: 2.2 G/DL (ref 3.5–5.2)
ALP SERPL-CCNC: 87 U/L (ref 55–135)
ALP SERPL-CCNC: 87 U/L (ref 55–135)
ALT SERPL W/O P-5'-P-CCNC: 73 U/L (ref 10–44)
ALT SERPL W/O P-5'-P-CCNC: 73 U/L (ref 10–44)
AMPHET+METHAMPHET UR QL: NEGATIVE
ANION GAP SERPL CALC-SCNC: 13 MMOL/L (ref 8–16)
ANION GAP SERPL CALC-SCNC: 13 MMOL/L (ref 8–16)
ANION GAP SERPL CALC-SCNC: 14 MMOL/L (ref 8–16)
APTT PPP: 28.9 SEC (ref 21–32)
ASCENDING AORTA: 2.73 CM
AST SERPL-CCNC: 66 U/L (ref 10–40)
AST SERPL-CCNC: 66 U/L (ref 10–40)
AV INDEX (PROSTH): 0.79
AV MEAN GRADIENT: 9 MMHG
AV PEAK GRADIENT: 17 MMHG
AV VALVE AREA BY VELOCITY RATIO: 2.02 CM²
AV VALVE AREA: 2.49 CM²
AV VELOCITY RATIO: 0.64
BACTERIA #/AREA URNS HPF: ABNORMAL /HPF
BARBITURATES UR QL SCN>200 NG/ML: NEGATIVE
BASOPHILS # BLD AUTO: 0.03 K/UL (ref 0–0.2)
BASOPHILS # BLD AUTO: 0.04 K/UL (ref 0–0.2)
BASOPHILS NFR BLD: 0.2 % (ref 0–1.9)
BASOPHILS NFR BLD: 0.2 % (ref 0–1.9)
BENZODIAZ UR QL SCN>200 NG/ML: NEGATIVE
BILIRUB SERPL-MCNC: 0.8 MG/DL (ref 0.1–1)
BILIRUB SERPL-MCNC: 0.8 MG/DL (ref 0.1–1)
BILIRUB UR QL STRIP: NEGATIVE
BUN SERPL-MCNC: 85 MG/DL (ref 8–23)
BUN SERPL-MCNC: 87 MG/DL (ref 8–23)
BUN SERPL-MCNC: 87 MG/DL (ref 8–23)
BZE UR QL SCN: NEGATIVE
CALCIUM SERPL-MCNC: 8.6 MG/DL (ref 8.7–10.5)
CANNABINOIDS UR QL SCN: NEGATIVE
CHLORIDE SERPL-SCNC: 94 MMOL/L (ref 95–110)
CHLORIDE SERPL-SCNC: 94 MMOL/L (ref 95–110)
CHLORIDE SERPL-SCNC: 95 MMOL/L (ref 95–110)
CK SERPL-CCNC: 58 U/L (ref 20–200)
CLARITY UR: ABNORMAL
CO2 SERPL-SCNC: 23 MMOL/L (ref 23–29)
CO2 SERPL-SCNC: 26 MMOL/L (ref 23–29)
CO2 SERPL-SCNC: 26 MMOL/L (ref 23–29)
COLOR UR: YELLOW
CREAT SERPL-MCNC: 7.5 MG/DL (ref 0.5–1.4)
CREAT UR-MCNC: 111.4 MG/DL (ref 23–375)
CREAT UR-MCNC: 93.8 MG/DL (ref 23–375)
CREAT UR-MCNC: 93.8 MG/DL (ref 23–375)
CV ECHO LV RWT: 0.56 CM
DIFFERENTIAL METHOD: ABNORMAL
DIFFERENTIAL METHOD: ABNORMAL
DOP CALC AO PEAK VEL: 2.06 M/S
DOP CALC AO VTI: 37.7 CM
DOP CALC LVOT AREA: 3.2 CM2
DOP CALC LVOT DIAMETER: 2.01 CM
DOP CALC LVOT PEAK VEL: 1.31 M/S
DOP CALC LVOT STROKE VOLUME: 93.88 CM3
DOP CALC MV VTI: 27 CM
DOP CALCLVOT PEAK VEL VTI: 29.6 CM
E WAVE DECELERATION TIME: 207.32 MSEC
E/A RATIO: 1
E/E' RATIO: 13.17 M/S
ECHO LV POSTERIOR WALL: 1.28 CM (ref 0.6–1.1)
EOSINOPHIL # BLD AUTO: 0 K/UL (ref 0–0.5)
EOSINOPHIL # BLD AUTO: 0.1 K/UL (ref 0–0.5)
EOSINOPHIL NFR BLD: 0.1 % (ref 0–8)
EOSINOPHIL NFR BLD: 0.5 % (ref 0–8)
ERYTHROCYTE [DISTWIDTH] IN BLOOD BY AUTOMATED COUNT: 14 % (ref 11.5–14.5)
ERYTHROCYTE [DISTWIDTH] IN BLOOD BY AUTOMATED COUNT: 14 % (ref 11.5–14.5)
EST. GFR  (NO RACE VARIABLE): 7 ML/MIN/1.73 M^2
FRACTIONAL SHORTENING: 35 % (ref 28–44)
GLUCOSE SERPL-MCNC: 111 MG/DL (ref 70–110)
GLUCOSE SERPL-MCNC: 132 MG/DL (ref 70–110)
GLUCOSE SERPL-MCNC: 132 MG/DL (ref 70–110)
GLUCOSE UR QL STRIP: NEGATIVE
HCT VFR BLD AUTO: 37.3 % (ref 40–54)
HCT VFR BLD AUTO: 37.6 % (ref 40–54)
HGB BLD-MCNC: 12.9 G/DL (ref 14–18)
HGB BLD-MCNC: 13 G/DL (ref 14–18)
HGB UR QL STRIP: ABNORMAL
HYALINE CASTS #/AREA URNS LPF: 0 /LPF
IMM GRANULOCYTES # BLD AUTO: 0.11 K/UL (ref 0–0.04)
IMM GRANULOCYTES # BLD AUTO: 0.14 K/UL (ref 0–0.04)
IMM GRANULOCYTES NFR BLD AUTO: 0.7 % (ref 0–0.5)
IMM GRANULOCYTES NFR BLD AUTO: 0.9 % (ref 0–0.5)
INR PPP: 1.3 (ref 0.8–1.2)
INTERVENTRICULAR SEPTUM: 1.29 CM (ref 0.6–1.1)
IVC DIAMETER: 1.74 CM
IVRT: 140.82 MSEC
KETONES UR QL STRIP: NEGATIVE
LA MAJOR: 5.8 CM
LA MINOR: 5.83 CM
LA WIDTH: 4.8 CM
LEFT ATRIUM SIZE: 4.11 CM
LEFT ATRIUM VOLUME: 97.51 CM3
LEFT INTERNAL DIMENSION IN SYSTOLE: 2.95 CM (ref 2.1–4)
LEFT VENTRICLE DIASTOLIC VOLUME: 95.78 ML
LEFT VENTRICLE SYSTOLIC VOLUME: 33.45 ML
LEFT VENTRICULAR INTERNAL DIMENSION IN DIASTOLE: 4.57 CM (ref 3.5–6)
LEFT VENTRICULAR MASS: 224.03 G
LEUKOCYTE ESTERASE UR QL STRIP: NEGATIVE
LV LATERAL E/E' RATIO: 11.29 M/S
LV SEPTAL E/E' RATIO: 15.8 M/S
LVOT MG: 3.37 MMHG
LVOT MV: 0.82 CM/S
LYMPHOCYTES # BLD AUTO: 1.2 K/UL (ref 1–4.8)
LYMPHOCYTES # BLD AUTO: 1.4 K/UL (ref 1–4.8)
LYMPHOCYTES NFR BLD: 7.9 % (ref 18–48)
LYMPHOCYTES NFR BLD: 8.9 % (ref 18–48)
MAGNESIUM SERPL-MCNC: 2.1 MG/DL (ref 1.6–2.6)
MCH RBC QN AUTO: 30.3 PG (ref 27–31)
MCH RBC QN AUTO: 30.9 PG (ref 27–31)
MCHC RBC AUTO-ENTMCNC: 34.3 G/DL (ref 32–36)
MCHC RBC AUTO-ENTMCNC: 34.9 G/DL (ref 32–36)
MCV RBC AUTO: 87 FL (ref 82–98)
MCV RBC AUTO: 90 FL (ref 82–98)
METHADONE UR QL SCN>300 NG/ML: NEGATIVE
MICROSCOPIC COMMENT: ABNORMAL
MONOCYTES # BLD AUTO: 1.3 K/UL (ref 0.3–1)
MONOCYTES # BLD AUTO: 1.3 K/UL (ref 0.3–1)
MONOCYTES NFR BLD: 8.2 % (ref 4–15)
MONOCYTES NFR BLD: 8.5 % (ref 4–15)
MV MEAN GRADIENT: 1 MMHG
MV PEAK A VEL: 0.79 M/S
MV PEAK E VEL: 0.79 M/S
MV PEAK GRADIENT: 4 MMHG
MV STENOSIS PRESSURE HALF TIME: 60.12 MS
MV VALVE AREA BY CONTINUITY EQUATION: 3.48 CM2
MV VALVE AREA P 1/2 METHOD: 3.66 CM2
NEUTROPHILS # BLD AUTO: 12.7 K/UL (ref 1.8–7.7)
NEUTROPHILS # BLD AUTO: 13.1 K/UL (ref 1.8–7.7)
NEUTROPHILS NFR BLD: 81.5 % (ref 38–73)
NEUTROPHILS NFR BLD: 82.4 % (ref 38–73)
NITRITE UR QL STRIP: NEGATIVE
NRBC BLD-RTO: 0 /100 WBC
NRBC BLD-RTO: 0 /100 WBC
OPIATES UR QL SCN: NEGATIVE
PCP UR QL SCN>25 NG/ML: NEGATIVE
PH UR STRIP: 5 [PH] (ref 5–8)
PHOSPHATE SERPL-MCNC: 5.9 MG/DL (ref 2.7–4.5)
PISA TR MAX VEL: 2.24 M/S
PLATELET # BLD AUTO: 186 K/UL (ref 150–450)
PLATELET # BLD AUTO: 198 K/UL (ref 150–450)
PMV BLD AUTO: 10.1 FL (ref 9.2–12.9)
PMV BLD AUTO: 9.9 FL (ref 9.2–12.9)
POCT GLUCOSE: 112 MG/DL (ref 70–110)
POTASSIUM SERPL-SCNC: 3.5 MMOL/L (ref 3.5–5.1)
POTASSIUM SERPL-SCNC: 3.5 MMOL/L (ref 3.5–5.1)
POTASSIUM SERPL-SCNC: 3.6 MMOL/L (ref 3.5–5.1)
PROT SERPL-MCNC: 6.3 G/DL (ref 6–8.4)
PROT SERPL-MCNC: 6.3 G/DL (ref 6–8.4)
PROT UR QL STRIP: ABNORMAL
PROTHROMBIN TIME: 13.5 SEC (ref 9–12.5)
PV PEAK GRADIENT: 5 MMHG
PV PEAK VELOCITY: 1.07 M/S
RA MAJOR: 6.64 CM
RA PRESSURE ESTIMATED: 3 MMHG
RA WIDTH: 4.2 CM
RBC # BLD AUTO: 4.18 M/UL (ref 4.6–6.2)
RBC # BLD AUTO: 4.29 M/UL (ref 4.6–6.2)
RBC #/AREA URNS HPF: 6 /HPF (ref 0–4)
RIGHT VENTRICULAR END-DIASTOLIC DIMENSION: 4.55 CM
RV TB RVSP: 5 MMHG
RV TISSUE DOPPLER FREE WALL SYSTOLIC VELOCITY 1 (APICAL 4 CHAMBER VIEW): 16.17 CM/S
SINUS: 3.55 CM
SODIUM SERPL-SCNC: 132 MMOL/L (ref 136–145)
SODIUM SERPL-SCNC: 133 MMOL/L (ref 136–145)
SODIUM SERPL-SCNC: 133 MMOL/L (ref 136–145)
SODIUM UR-SCNC: 40 MMOL/L (ref 20–250)
SODIUM UR-SCNC: 59 MMOL/L (ref 20–250)
SP GR UR STRIP: 1.01 (ref 1–1.03)
SQUAMOUS #/AREA URNS HPF: 1 /HPF
STJ: 1.93 CM
TDI LATERAL: 0.07 M/S
TDI SEPTAL: 0.05 M/S
TDI: 0.06 M/S
TOXICOLOGY INFORMATION: NORMAL
TR MAX PG: 20 MMHG
TRICUSPID ANNULAR PLANE SYSTOLIC EXCURSION: 2.14 CM
TV REST PULMONARY ARTERY PRESSURE: 23 MMHG
URATE SERPL-MCNC: 10 MG/DL (ref 3.4–7)
URN SPEC COLLECT METH UR: ABNORMAL
UROBILINOGEN UR STRIP-ACNC: NEGATIVE EU/DL
WBC # BLD AUTO: 15.37 K/UL (ref 3.9–12.7)
WBC # BLD AUTO: 16.07 K/UL (ref 3.9–12.7)
WBC #/AREA URNS HPF: 8 /HPF (ref 0–5)

## 2023-08-07 PROCEDURE — S4991 NICOTINE PATCH NONLEGEND: HCPCS | Performed by: INTERNAL MEDICINE

## 2023-08-07 PROCEDURE — 63600175 PHARM REV CODE 636 W HCPCS: Mod: ER | Performed by: INTERNAL MEDICINE

## 2023-08-07 PROCEDURE — 99291 PR CRITICAL CARE, E/M 30-74 MINUTES: ICD-10-PCS | Mod: 25,,, | Performed by: INTERNAL MEDICINE

## 2023-08-07 PROCEDURE — 84300 ASSAY OF URINE SODIUM: CPT | Performed by: INTERNAL MEDICINE

## 2023-08-07 PROCEDURE — 25000003 PHARM REV CODE 250: Performed by: STUDENT IN AN ORGANIZED HEALTH CARE EDUCATION/TRAINING PROGRAM

## 2023-08-07 PROCEDURE — 83735 ASSAY OF MAGNESIUM: CPT | Performed by: INTERNAL MEDICINE

## 2023-08-07 PROCEDURE — 94761 N-INVAS EAR/PLS OXIMETRY MLT: CPT

## 2023-08-07 PROCEDURE — 94640 AIRWAY INHALATION TREATMENT: CPT

## 2023-08-07 PROCEDURE — 82550 ASSAY OF CK (CPK): CPT | Performed by: INTERNAL MEDICINE

## 2023-08-07 PROCEDURE — 85610 PROTHROMBIN TIME: CPT | Performed by: INTERNAL MEDICINE

## 2023-08-07 PROCEDURE — 99285 EMERGENCY DEPT VISIT HI MDM: CPT | Mod: 25

## 2023-08-07 PROCEDURE — 25000003 PHARM REV CODE 250: Performed by: INTERNAL MEDICINE

## 2023-08-07 PROCEDURE — 20000000 HC ICU ROOM

## 2023-08-07 PROCEDURE — 96360 HYDRATION IV INFUSION INIT: CPT

## 2023-08-07 PROCEDURE — 82570 ASSAY OF URINE CREATININE: CPT | Performed by: INTERNAL MEDICINE

## 2023-08-07 PROCEDURE — 84100 ASSAY OF PHOSPHORUS: CPT | Performed by: INTERNAL MEDICINE

## 2023-08-07 PROCEDURE — 80307 DRUG TEST PRSMV CHEM ANLYZR: CPT | Performed by: STUDENT IN AN ORGANIZED HEALTH CARE EDUCATION/TRAINING PROGRAM

## 2023-08-07 PROCEDURE — 84550 ASSAY OF BLOOD/URIC ACID: CPT | Performed by: INTERNAL MEDICINE

## 2023-08-07 PROCEDURE — 80053 COMPREHEN METABOLIC PANEL: CPT | Performed by: INTERNAL MEDICINE

## 2023-08-07 PROCEDURE — 94644 CONT INHLJ TX 1ST HOUR: CPT

## 2023-08-07 PROCEDURE — 25000242 PHARM REV CODE 250 ALT 637 W/ HCPCS: Performed by: INTERNAL MEDICINE

## 2023-08-07 PROCEDURE — 99291 CRITICAL CARE FIRST HOUR: CPT | Mod: 25,,, | Performed by: INTERNAL MEDICINE

## 2023-08-07 PROCEDURE — 63600175 PHARM REV CODE 636 W HCPCS: Performed by: INTERNAL MEDICINE

## 2023-08-07 PROCEDURE — 85025 COMPLETE CBC W/AUTO DIFF WBC: CPT | Mod: 91 | Performed by: INTERNAL MEDICINE

## 2023-08-07 PROCEDURE — 25000242 PHARM REV CODE 250 ALT 637 W/ HCPCS: Mod: ER | Performed by: INTERNAL MEDICINE

## 2023-08-07 PROCEDURE — 84300 ASSAY OF URINE SODIUM: CPT | Mod: 91 | Performed by: INTERNAL MEDICINE

## 2023-08-07 PROCEDURE — 80048 BASIC METABOLIC PNL TOTAL CA: CPT | Performed by: INTERNAL MEDICINE

## 2023-08-07 PROCEDURE — 85730 THROMBOPLASTIN TIME PARTIAL: CPT | Performed by: INTERNAL MEDICINE

## 2023-08-07 PROCEDURE — 81000 URINALYSIS NONAUTO W/SCOPE: CPT | Mod: 59 | Performed by: INTERNAL MEDICINE

## 2023-08-07 PROCEDURE — 27000221 HC OXYGEN, UP TO 24 HOURS

## 2023-08-07 RX ORDER — CARVEDILOL 25 MG/1
25 TABLET ORAL 2 TIMES DAILY
Status: ON HOLD | COMMUNITY
Start: 2023-07-18 | End: 2023-08-15 | Stop reason: HOSPADM

## 2023-08-07 RX ORDER — NALOXONE HCL 0.4 MG/ML
0.02 VIAL (ML) INJECTION
Status: DISCONTINUED | OUTPATIENT
Start: 2023-08-07 | End: 2023-08-15 | Stop reason: HOSPADM

## 2023-08-07 RX ORDER — ATORVASTATIN CALCIUM 40 MG/1
40 TABLET, FILM COATED ORAL NIGHTLY
Status: DISCONTINUED | OUTPATIENT
Start: 2023-08-07 | End: 2023-08-15 | Stop reason: HOSPADM

## 2023-08-07 RX ORDER — FAMOTIDINE 20 MG/1
20 TABLET, FILM COATED ORAL DAILY
Status: DISCONTINUED | OUTPATIENT
Start: 2023-08-07 | End: 2023-08-15 | Stop reason: HOSPADM

## 2023-08-07 RX ORDER — NICOTINE 7MG/24HR
1 PATCH, TRANSDERMAL 24 HOURS TRANSDERMAL DAILY
Status: DISCONTINUED | OUTPATIENT
Start: 2023-08-07 | End: 2023-08-15 | Stop reason: HOSPADM

## 2023-08-07 RX ORDER — FAMOTIDINE 20 MG/1
20 TABLET, FILM COATED ORAL 2 TIMES DAILY
Status: DISCONTINUED | OUTPATIENT
Start: 2023-08-07 | End: 2023-08-07 | Stop reason: DRUGHIGH

## 2023-08-07 RX ORDER — ONDANSETRON 2 MG/ML
4 INJECTION INTRAMUSCULAR; INTRAVENOUS EVERY 8 HOURS PRN
Status: DISCONTINUED | OUTPATIENT
Start: 2023-08-07 | End: 2023-08-15 | Stop reason: HOSPADM

## 2023-08-07 RX ORDER — ROSUVASTATIN CALCIUM 20 MG/1
20 TABLET, COATED ORAL DAILY
COMMUNITY
Start: 2023-07-17 | End: 2023-10-05 | Stop reason: SDUPTHER

## 2023-08-07 RX ORDER — AMLODIPINE AND OLMESARTAN MEDOXOMIL 5; 40 MG/1; MG/1
1 TABLET ORAL DAILY
Status: ON HOLD | COMMUNITY
Start: 2023-07-21 | End: 2023-08-15 | Stop reason: HOSPADM

## 2023-08-07 RX ORDER — SODIUM CHLORIDE, SODIUM LACTATE, POTASSIUM CHLORIDE, CALCIUM CHLORIDE 600; 310; 30; 20 MG/100ML; MG/100ML; MG/100ML; MG/100ML
INJECTION, SOLUTION INTRAVENOUS CONTINUOUS
Status: DISCONTINUED | OUTPATIENT
Start: 2023-08-07 | End: 2023-08-15 | Stop reason: HOSPADM

## 2023-08-07 RX ORDER — IPRATROPIUM BROMIDE AND ALBUTEROL SULFATE 2.5; .5 MG/3ML; MG/3ML
3 SOLUTION RESPIRATORY (INHALATION)
Status: COMPLETED | OUTPATIENT
Start: 2023-08-07 | End: 2023-08-07

## 2023-08-07 RX ORDER — IBUPROFEN 200 MG
16 TABLET ORAL
Status: DISCONTINUED | OUTPATIENT
Start: 2023-08-07 | End: 2023-08-15 | Stop reason: HOSPADM

## 2023-08-07 RX ORDER — CHLORTHALIDONE 25 MG/1
25 TABLET ORAL DAILY
Status: ON HOLD | COMMUNITY
Start: 2023-07-24 | End: 2023-08-15 | Stop reason: HOSPADM

## 2023-08-07 RX ORDER — IPRATROPIUM BROMIDE AND ALBUTEROL SULFATE 2.5; .5 MG/3ML; MG/3ML
3 SOLUTION RESPIRATORY (INHALATION) EVERY 6 HOURS
Status: DISCONTINUED | OUTPATIENT
Start: 2023-08-07 | End: 2023-08-15 | Stop reason: HOSPADM

## 2023-08-07 RX ORDER — MAG HYDROX/ALUMINUM HYD/SIMETH 200-200-20
30 SUSPENSION, ORAL (FINAL DOSE FORM) ORAL 4 TIMES DAILY PRN
Status: DISCONTINUED | OUTPATIENT
Start: 2023-08-07 | End: 2023-08-15 | Stop reason: HOSPADM

## 2023-08-07 RX ORDER — SODIUM CHLORIDE 0.9 % (FLUSH) 0.9 %
10 SYRINGE (ML) INJECTION EVERY 12 HOURS PRN
Status: DISCONTINUED | OUTPATIENT
Start: 2023-08-07 | End: 2023-08-15 | Stop reason: HOSPADM

## 2023-08-07 RX ORDER — ASPIRIN 81 MG/1
81 TABLET ORAL DAILY
Status: DISCONTINUED | OUTPATIENT
Start: 2023-08-08 | End: 2023-08-15 | Stop reason: HOSPADM

## 2023-08-07 RX ORDER — IBUPROFEN 200 MG
24 TABLET ORAL
Status: DISCONTINUED | OUTPATIENT
Start: 2023-08-07 | End: 2023-08-15 | Stop reason: HOSPADM

## 2023-08-07 RX ORDER — GLUCAGON 1 MG
1 KIT INJECTION
Status: DISCONTINUED | OUTPATIENT
Start: 2023-08-07 | End: 2023-08-15 | Stop reason: HOSPADM

## 2023-08-07 RX ORDER — HEPARIN SODIUM,PORCINE/D5W 25000/250
0-40 INTRAVENOUS SOLUTION INTRAVENOUS CONTINUOUS
Status: DISCONTINUED | OUTPATIENT
Start: 2023-08-07 | End: 2023-08-08 | Stop reason: ALTCHOICE

## 2023-08-07 RX ADMIN — IPRATROPIUM BROMIDE AND ALBUTEROL SULFATE 3 ML: 2.5; .5 SOLUTION RESPIRATORY (INHALATION) at 08:08

## 2023-08-07 RX ADMIN — AMIODARONE HYDROCHLORIDE 1 MG/MIN: 1.8 INJECTION, SOLUTION INTRAVENOUS at 08:08

## 2023-08-07 RX ADMIN — IPRATROPIUM BROMIDE AND ALBUTEROL SULFATE 3 ML: 2.5; .5 SOLUTION RESPIRATORY (INHALATION) at 12:08

## 2023-08-07 RX ADMIN — NICOTINE 1 PATCH: 7 PATCH, EXTENDED RELEASE TRANSDERMAL at 09:08

## 2023-08-07 RX ADMIN — IPRATROPIUM BROMIDE AND ALBUTEROL SULFATE 3 ML: .5; 3 SOLUTION RESPIRATORY (INHALATION) at 12:08

## 2023-08-07 RX ADMIN — SODIUM CHLORIDE, POTASSIUM CHLORIDE, SODIUM LACTATE AND CALCIUM CHLORIDE: 600; 310; 30; 20 INJECTION, SOLUTION INTRAVENOUS at 04:08

## 2023-08-07 RX ADMIN — AMIODARONE HYDROCHLORIDE 150 MG: 1.5 INJECTION, SOLUTION INTRAVENOUS at 07:08

## 2023-08-07 RX ADMIN — ATORVASTATIN CALCIUM 40 MG: 40 TABLET, FILM COATED ORAL at 09:08

## 2023-08-07 RX ADMIN — HEPARIN SODIUM 12 UNITS/KG/HR: 10000 INJECTION, SOLUTION INTRAVENOUS at 07:08

## 2023-08-07 RX ADMIN — IPRATROPIUM BROMIDE AND ALBUTEROL SULFATE 3 ML: 2.5; .5 SOLUTION RESPIRATORY (INHALATION) at 07:08

## 2023-08-07 RX ADMIN — SODIUM CHLORIDE, POTASSIUM CHLORIDE, SODIUM LACTATE AND CALCIUM CHLORIDE: 600; 310; 30; 20 INJECTION, SOLUTION INTRAVENOUS at 03:08

## 2023-08-07 NOTE — HPI
62 y.o. male, with a PMHx of HTN, HLD, tobacco abuse, and hx of heroin abuse/alcohol abuse transferred from [facility] ED for further evaluation. He presented to the ED with cocerns of low blood pressure. Stated for the past week, he had low blood pressure readings with SBP 90-100s and DBP 50s. Noted hypotension associated with feeling fatigue, weak, and make him just want to sleep all day. Stated he has not eat or drink much. However, continued to take his home BP medications. Tried to hydrate himself with gatorade prior to coming to the ED. Denies any fever, chest pain, cough, shortness of breath, abdominal pain, nausea or vomiting. Admits he still smokes 1ppd, smoke for the past 40+ years.     In the ED, pt hypotensive with BP 94/53. Patient given IVF. Labs significant for leukocytosis, elevated Cr of 7.4, and elevated phosphorus. US renal with no evidence for hydronephrosis bilaterally.  CXR with no focal consolidations or edema. Patient admitted to  for further evaluation.

## 2023-08-07 NOTE — PLAN OF CARE
[facility] - Emergency Dept  Initial Discharge Assessment       Primary Care Provider: No, Primary Doctor  Case Management Assessment     PCP: Needs to establish care with a primary care provider.  Was last seen at [facility].  Patient's cardiologist is Dr. Carnes @ [place]  [facility]acy: [facility] on [address] and [address]    Patient Arrived From: Home with family  Existing Help at Home: Cousin, Paul, and his wife    Barriers to Discharge: None    Discharge Plan:    A. Home with family   B. Home      Discharge planning assessment completed with patient's assistance.  Patient from home with family members and independent.  Patient's cousin assists him with transportation.  Patient needs to establish care with a primary care provider.  He was last seen at [facility].  Patient will need to followup with a primary care provider at discharge.         Admission Diagnosis: LAM (acute kidney injury) [N17.9]    Admission Date: 8/6/2023  Expected Discharge Date:          Payor: MEDICAID / Plan: [facility] CONNECT / Product Type: Managed Medicaid /     Extended Emergency Contact Information  Primary Emergency Contact: Solange turcios  Mobile Phone: [phone]  Relation: Daughter  Preferred language: English   needed? No    Discharge Plan A: Home with family  Discharge Plan B: Home      [facility] [address]  [address]   [facility] [address]  Phone: [phone] Fax: [phone]    [id number] - [place] - [address] AT [facility] [address]  [address]  [place] [address]  Phone: [phone] Fax: [phone]      Initial Assessment (most recent)       Adult Discharge Assessment - 08/07/23 1337          Discharge Assessment    Assessment Type Discharge Planning Assessment     Confirmed/corrected address, phone number and insurance Yes     Confirmed Demographics Correct on Facesheet     Source of Information patient;health  record     When was your last doctors appointment? --   Two months ago    Reason For Admission LAM     People in Home other relative(s)   Cousin, Paul, also lives in the home.    Facility Arrived From: Home     Do you expect to return to your current living situation? Yes     Do you have help at home or someone to help you manage your care at home? Yes     Who are your caregiver(s) and their phone number(s)? Cousin or his wife helps with transportation to and from appointments.     Prior to hospitilization cognitive status: Alert/Oriented     Current cognitive status: Alert/Oriented     Equipment Currently Used at Home none     Readmission within 30 days? No     Patient currently being followed by outpatient case management? No     Do you currently have service(s) that help you manage your care at home? No     Do you take prescription medications? Yes     Do you have prescription coverage? Yes     Coverage Medicaid     Do you have any problems affording any of your prescribed medications? No     Who is going to help you get home at discharge? Cousin     How do you get to doctors appointments? family or friend will provide;other (see comments)   Cab    Are you on dialysis? No     Do you take coumadin? No     Discharge Plan A Home with family     Discharge Plan B Home     DME Needed Upon Discharge  none     Discharge Plan discussed with: Patient

## 2023-08-07 NOTE — CONSULTS
62 y o m retired  admitted with @ 2 weeks hx of decreased po intake weakness, dizzy ness and lightheadedness.  Renal consult requested to help with evaluating and tx for unexpected high bun creat and mild hyponatremia.    Pt endorses using Aleve's often, denies dysuria hematuria or frequency. Also denies K stones or uti's.    There has been no recent exposure to IVD or vanco     Denies CNS ENT CP GI  RHEUM OR DERM SX  Past Medical History:   Diagnosis Date    Hypertension      Past Surgical History:   Procedure Laterality Date    HERNIA REPAIR       Social History     Socioeconomic History    Marital status: Single   Tobacco Use    Smoking status: Every Day     Current packs/day: 1.00     Types: Cigarettes   Substance and Sexual Activity    Alcohol use: Yes     Alcohol/week: 12.0 standard drinks of alcohol     Types: 12 Cans of beer per week    Drug use: No     History reviewed. No pertinent family history.,  Review of patient's allergies indicates:  No Known Allergies    Current Facility-Administered Medications   Medication    albuterol-ipratropium 2.5 mg-0.5 mg/3 mL nebulizer solution 3 mL    aluminum-magnesium hydroxide-simethicone 200-200-20 mg/5 mL suspension 30 mL    atorvastatin tablet 40 mg    dextrose 50% injection 12.5 g    dextrose 50% injection 25 g    famotidine tablet 20 mg    glucagon (human recombinant) injection 1 mg    glucose chewable tablet 16 g    glucose chewable tablet 24 g    lactated ringers infusion    naloxone 0.4 mg/mL injection 0.02 mg    nicotine 7 mg/24 hr 1 patch    ondansetron injection 4 mg    sodium chloride 0.9% flush 10 mL     Current Outpatient Medications   Medication Sig    amlodipine-olmesartan (CHERI) 5-40 mg per tablet Take 1 tablet by mouth once daily.    aspirin 81 MG Chew Take 1 tablet (81 mg total) by mouth once daily.    carvediloL (COREG) 25 MG tablet Take 25 mg by mouth 2 (two) times daily.    chlorthalidone (HYGROTEN) 25 MG Tab Take 25 mg by mouth once  daily.    rosuvastatin (CRESTOR) 20 MG tablet Take 20 mg by mouth once daily.    naloxone (NARCAN) 1 mg/mL injection 2 mg (1 mg per nostril) by Nasal route as needed for opioid overdose; may repeat in 3 to 5 minutes if not effective. Call 911 (Patient not taking: Reported on 8/7/2023)    nicotine (NICODERM CQ) 7 mg/24 hr Place 1 patch onto the skin once daily.       LABS    Recent Results (from the past 24 hour(s))   Troponin ISTAT    Collection Time: 08/06/23  9:56 PM   Result Value Ref Range    POC Cardiac Troponin I 0.02 0.00 - 0.08 ng/mL    Sample unknown    POCT CMP    Collection Time: 08/06/23  9:56 PM   Result Value Ref Range    Albumin, POC 2.6 3.3 - 5.5 g/dL    Alkaline Phosphatase, POC 80 42 - 141 U/L    ALT (SGPT), POC 77 (H) 10 - 47 U/L    AST (SGOT), POC 83 (H) 11 - 38 U/L    POC BUN 91 (H) 7 - 22 mg/dL    Calcium, POC 9.2 8.0 - 10.3 mg/dL    POC Chloride 89 (L) 98 - 108 mmol/L    POC Creatinine 7.4 (H) 0.6 - 1.2 mg/dL    POC Glucose 178 (H) 73 - 118 mg/dL    POC Potassium 4.2 3.6 - 5.1 mmol/L    POC Sodium 135 128 - 145 mmol/L    Bilirubin, POC 0.5 0.2 - 1.6 mg/dL    POC TCO2 27 18 - 33 mmol/L    Protein, POC 7.1 6.4 - 8.1 g/dL   ISTAT PROCEDURE    Collection Time: 08/06/23 10:01 PM   Result Value Ref Range    POC PH 7.383 7.35 - 7.45    POC PCO2 45.7 (H) 35 - 45 mmHg    POC PO2 17 (L) 40 - 60 mmHg    POC HCO3 27.2 24 - 28 mmol/L    POC BE 2 -2 to 2 mmol/L    POC SATURATED O2 24 (L) 95 - 100 %    POC Lactate 2.03 0.5 - 2.2 mmol/L    POC TCO2 29 24 - 29 mmol/L    Sample VENOUS     Site Other     Allens Test N/A    POCT COVID-19 Rapid Screening    Collection Time: 08/06/23 10:36 PM   Result Value Ref Range    POC Rapid COVID Negative Negative     Acceptable Yes    POCT URINALYSIS W/O SCOPE    Collection Time: 08/06/23 11:15 PM   Result Value Ref Range    Glucose, UA Negative     Bilirubin, UA 1+ (A)     Ketones, UA Negative     Spec Grav UA >=1.030 (>)     Blood, UA 2+ (A)     PH, UA 5.0      Protein, UA 3+ (A)     Urobilinogen, UA 2.0 (A) E.U./dL    Nitrite, UA Negative     Leukocytes, UA Negative     Color, UA Yellow     Clarity, UA Clear    Basic Metabolic Panel (BMP)    Collection Time: 08/07/23  7:16 AM   Result Value Ref Range    Sodium 132 (L) 136 - 145 mmol/L    Potassium 3.6 3.5 - 5.1 mmol/L    Chloride 95 95 - 110 mmol/L    CO2 23 23 - 29 mmol/L    Glucose 111 (H) 70 - 110 mg/dL    BUN 85 (H) 8 - 23 mg/dL    Creatinine 7.5 (H) 0.5 - 1.4 mg/dL    Calcium 8.6 (L) 8.7 - 10.5 mg/dL    Anion Gap 14 8 - 16 mmol/L    eGFR 7 (A) >60 mL/min/1.73 m^2   Magnesium    Collection Time: 08/07/23  7:16 AM   Result Value Ref Range    Magnesium 2.1 1.6 - 2.6 mg/dL   Phosphorus    Collection Time: 08/07/23  7:16 AM   Result Value Ref Range    Phosphorus 5.9 (H) 2.7 - 4.5 mg/dL   CBC with Automated Differential    Collection Time: 08/07/23  7:16 AM   Result Value Ref Range    WBC 16.07 (H) 3.90 - 12.70 K/uL    RBC 4.18 (L) 4.60 - 6.20 M/uL    Hemoglobin 12.9 (L) 14.0 - 18.0 g/dL    Hematocrit 37.6 (L) 40.0 - 54.0 %    MCV 90 82 - 98 fL    MCH 30.9 27.0 - 31.0 pg    MCHC 34.3 32.0 - 36.0 g/dL    RDW 14.0 11.5 - 14.5 %    Platelets 198 150 - 450 K/uL    MPV 10.1 9.2 - 12.9 fL    Immature Granulocytes 0.7 (H) 0.0 - 0.5 %    Gran # (ANC) 13.1 (H) 1.8 - 7.7 K/uL    Immature Grans (Abs) 0.11 (H) 0.00 - 0.04 K/uL    Lymph # 1.4 1.0 - 4.8 K/uL    Mono # 1.3 (H) 0.3 - 1.0 K/uL    Eos # 0.1 0.0 - 0.5 K/uL    Baso # 0.04 0.00 - 0.20 K/uL    nRBC 0 0 /100 WBC    Gran % 81.5 (H) 38.0 - 73.0 %    Lymph % 8.9 (L) 18.0 - 48.0 %    Mono % 8.2 4.0 - 15.0 %    Eosinophil % 0.5 0.0 - 8.0 %    Basophil % 0.2 0.0 - 1.9 %    Differential Method Automated    POCT glucose    Collection Time: 08/07/23  7:47 AM   Result Value Ref Range    POCT Glucose 112 (H) 70 - 110 mg/dL     Renal US    FINDINGS:  The right kidney measures approximately 11.1 cm in length.  There is no evidence for hydronephrosis.  Color imaging demonstrates flow to  the right kidney with a resistive index measurement of 0.60.  At the lower pole of the right kidney there is a 4.1 mm hyperechoic focus that may relate to a small nonobstructing calculus.  At the upper pole of the right kidney there is a hypoechoic finding measuring approximately 11 x 6.9 by 13.4 mm with posterior wall enhancement and increased through transmission, most consistent with a small cyst.     The left kidney measures approximately 13 cm in length.  There is no evidence for hydronephrosis.  Color imaging demonstrates flow to the left kidney with a resistive index measurement of 0.72.  At the mid to upper pole of the left kidney there is an exophytic hypoechoic finding measuring approximately 3.2 x 2.6 x 2.7 cm in size, consistent with a cyst.     The urinary bladder is identified measuring approximately 5 x 7.3 x 5.4 cm with a calculated volume of 102.42 mL.  Postvoid imaging is not available.     The prostate measures approximately 3.2 x 3 x 4.4 cm in size with a calculated volume of 22 cc.     Impression:     There is no evidence for hydronephrosis bilaterally.     Mild elevated resistive index of the left kidney measuring 0.72 can be seen with medical renal disease.     Nonobstructing calculus of the right kidney.     Renal cysts are noted.  I/O last 3 completed shifts:  In: 1000 [IV Piggyback:1000]  Out: 100 [Urine:100]    Vitals:    08/07/23 0847 08/07/23 0904 08/07/23 0920 08/07/23 0924   BP:  117/60     Pulse:   60    Resp:    16   Temp: 98 °F (36.7 °C)      TempSrc: Oral      SpO2:   97%    Weight:           No Jvd, Thyromegaly or Lymphadenopathy  Lungs: Fairly clear anteriorly and laterally  Cor: RRR no G or rubs  Abd: Soft benign good bowel sounds non tender  Ext: No E C C    A)    LAM cause likely related to decreased PO intake, meds, maybe infection (has elevated wbc) aleve   Hx of hrn   Hx of drug use   Renal US suggests CKD  Asx K stone  Active Smoker   Active etoh user   High po4  Consider  rhabdo/gout/hepatitis     P)    Renal Diet  Home meds  Protect access  HD not needed right now  Binders prn  Check pthi uric acid cpk  UA   FENA   Adjust all meds to the degree of renal fx  Close follow up I/O and weights  Maintain Hydration

## 2023-08-07 NOTE — H&P
[facility]  [facility]  History & Physical    Patient Name: Vivek Whitman  MRN: [id number]  Patient Class: IP- Inpatient  Admission Date: 8/6/2023  Attending Physician: Shaina Heath DO   Primary Care Provider: Racheal, Primary Doctor         Patient information was obtained from patient and ER records.     Subjective:     Principal Problem:LAM (acute kidney injury)    Chief Complaint:   Chief Complaint   Patient presents with    Hypotension     Pt reports he started to have low blood pressure over a week ago   Pt is dizzy, lightheaded  Daughter reports pt is fatigued and not wanting to get out of bed. Daughter also states pt went to the restroom on himself  Pt normal BP is usually high   Daughter states doctor keeps changing pts blood pressure meds  Last time meds were changed was a month ago         HPI:  62 y.o. male, with a PMHx of HTN, HLD, tobacco abuse, and hx of heroin abuse/alcohol abuse transferred from [facility] ED for further evaluation. He presented to the ED with cocerns of low blood pressure. Stated for the past week, he had low blood pressure readings with SBP 90-100s and DBP 50s. Noted hypotension associated with feeling fatigue, weak, and make him just want to sleep all day. Stated he has not eat or drink much. However, continued to take his home BP medications. Tried to hydrate himself with gatorade prior to coming to the ED. Denies any fever, chest pain, cough, shortness of breath, abdominal pain, nausea or vomiting. Admits he still smokes 1ppd, smoke for the past 40+ years.     In the ED, pt hypotensive with BP 94/53. Patient given IVF. Labs significant for leukocytosis, elevated Cr of 7.4, and elevated phosphorus. US renal with no evidence for hydronephrosis bilaterally.  CXR with no focal consolidations or edema. Patient admitted to  for further evaluation.       Past Medical History:   Diagnosis Date    Hypertension        Past Surgical History:   Procedure Laterality Date     HERNIA REPAIR         Review of patient's allergies indicates:  No Known Allergies    No current facility-administered medications on file prior to encounter.     Current Outpatient Medications on File Prior to Encounter   Medication Sig    amlodipine-olmesartan (CHERI) 5-40 mg per tablet Take 1 tablet by mouth once daily.    aspirin 81 MG Chew Take 1 tablet (81 mg total) by mouth once daily.    carvediloL (COREG) 25 MG tablet Take 25 mg by mouth 2 (two) times daily.    chlorthalidone (HYGROTEN) 25 MG Tab Take 25 mg by mouth once daily.    rosuvastatin (CRESTOR) 20 MG tablet Take 20 mg by mouth once daily.    naloxone (NARCAN) 1 mg/mL injection 2 mg (1 mg per nostril) by Nasal route as needed for opioid overdose; may repeat in 3 to 5 minutes if not effective. Call 911 (Patient not taking: Reported on 8/7/2023)    nicotine (NICODERM CQ) 7 mg/24 hr Place 1 patch onto the skin once daily.    [DISCONTINUED] famotidine (PEPCID) 20 MG tablet Take 1 tablet (20 mg total) by mouth 2 (two) times daily.    [DISCONTINUED] hydroCHLOROthiazide (HYDRODIURIL) 25 MG tablet Take 1 tablet (25 mg total) by mouth once daily.    [DISCONTINUED] lisinopriL (PRINIVIL,ZESTRIL) 40 MG tablet Take 1 tablet (40 mg total) by mouth once daily.    [DISCONTINUED] meclizine (ANTIVERT) 25 mg tablet Take 1 tablet (25 mg total) by mouth 3 (three) times daily as needed for Dizziness.     Family History    None       Tobacco Use    Smoking status: Every Day     Current packs/day: 1.00     Types: Cigarettes    Smokeless tobacco: Not on file   Substance and Sexual Activity    Alcohol use: Yes     Alcohol/week: 12.0 standard drinks of alcohol     Types: 12 Cans of beer per week    Drug use: No    Sexual activity: Not on file     Review of Systems   Constitutional:  Positive for activity change, appetite change and fatigue. Negative for chills and fever.   HENT:  Negative for congestion and trouble swallowing.    Respiratory:  Negative for  cough, chest tightness, shortness of breath and wheezing.    Cardiovascular:  Negative for chest pain, palpitations and leg swelling.   Gastrointestinal:  Negative for abdominal distention, abdominal pain, blood in stool, diarrhea, nausea and vomiting.   Genitourinary:  Negative for difficulty urinating and dysuria.   Musculoskeletal:  Negative for back pain and joint swelling.   Skin:  Negative for wound.   Neurological:  Positive for dizziness, weakness and light-headedness. Negative for headaches.   Psychiatric/Behavioral:  Negative for confusion and hallucinations.      Objective:     Vital Signs (Most Recent):  Temp: 98 °F (36.7 °C) (08/07/23 0847)  Pulse: 64 (08/07/23 1103)  Resp: (!) 21 (08/07/23 1103)  BP: 102/66 (08/07/23 1103)  SpO2: 97 % (08/07/23 1103) Vital Signs (24h Range):  Temp:  [98 °F (36.7 °C)-98.7 °F (37.1 °C)] 98 °F (36.7 °C)  Pulse:  [59-72] 64  Resp:  [16-26] 21  SpO2:  [9 %-100 %] 97 %  BP: ()/(41-66) 102/66     Weight: 93 kg (205 lb)  Body mass index is 31.17 kg/m².     Physical Exam  Vitals and nursing note reviewed.   Constitutional:       General: He is not in acute distress.     Appearance: He is obese. He is not ill-appearing.   Eyes:      Extraocular Movements: Extraocular movements intact.      Pupils: Pupils are equal, round, and reactive to light.   Cardiovascular:      Rate and Rhythm: Normal rate and regular rhythm.      Heart sounds: No murmur heard.     No gallop.   Pulmonary:      Effort: Pulmonary effort is normal. No respiratory distress.      Breath sounds: Normal breath sounds. No wheezing.   Abdominal:      General: There is no distension.      Palpations: Abdomen is soft.      Tenderness: There is no abdominal tenderness. There is no right CVA tenderness, left CVA tenderness or guarding.   Musculoskeletal:         General: No swelling or tenderness.      Right lower leg: No edema.      Left lower leg: No edema.   Skin:     General: Skin is warm and dry.       Comments: Face appears flushed   Neurological:      General: No focal deficit present.      Mental Status: He is alert and oriented to person, place, and time.   Psychiatric:         Mood and Affect: Mood normal.         Thought Content: Thought content normal.              CRANIAL NERVES     CN III, IV, VI   Pupils are equal, round, and reactive to light.       Significant Labs: All pertinent labs within the past 24 hours have been reviewed.    CBC:   Recent Labs   Lab 08/07/23  0716   WBC 16.07*   HGB 12.9*   HCT 37.6*        CMP:   Recent Labs   Lab 08/07/23  0716   *   K 3.6   CL 95   CO2 23   *   BUN 85*   CREATININE 7.5*   CALCIUM 8.6*   ANIONGAP 14       Magnesium:   Recent Labs   Lab 08/07/23  0716   MG 2.1       Urine Studies:   Recent Labs   Lab 08/06/23  2315   COLORU Yellow   SPECGRAV >=1.030*   NITRITE Negative   UROBILINOGEN 2.0*   LEUKOCYTESUR Negative       Significant Imaging: I have reviewed all pertinent imaging results/findings within the past 24 hours.    Imaging Results              US Retroperitoneal Complete (Final result)  Result time 08/07/23 03:33:50      Final result by Marquise Humphries MD (08/07/23 03:33:50)                   Impression:      There is no evidence for hydronephrosis bilaterally.    Mild elevated resistive index of the left kidney measuring 0.72 can be seen with medical renal disease.    Nonobstructing calculus of the right kidney.    Renal cysts are noted.      Electronically signed by: Marquise Humphries  Date:    08/07/2023  Time:    03:33               Narrative:    EXAMINATION:  US RETROPERITONEAL COMPLETE    CLINICAL HISTORY:  acute renal failure;    TECHNIQUE:  Ultrasound of the kidneys and urinary bladder was performed including color flow and Doppler evaluation of the kidneys.    COMPARISON:  None.    FINDINGS:  The right kidney measures approximately 11.1 cm in length.  There is no evidence for hydronephrosis.  Color imaging demonstrates flow  to the right kidney with a resistive index measurement of 0.60.  At the lower pole of the right kidney there is a 4.1 mm hyperechoic focus that may relate to a small nonobstructing calculus.  At the upper pole of the right kidney there is a hypoechoic finding measuring approximately 11 x 6.9 by 13.4 mm with posterior wall enhancement and increased through transmission, most consistent with a small cyst.    The left kidney measures approximately 13 cm in length.  There is no evidence for hydronephrosis.  Color imaging demonstrates flow to the left kidney with a resistive index measurement of 0.72.  At the mid to upper pole of the left kidney there is an exophytic hypoechoic finding measuring approximately 3.2 x 2.6 x 2.7 cm in size, consistent with a cyst.    The urinary bladder is identified measuring approximately 5 x 7.3 x 5.4 cm with a calculated volume of 102.42 mL.  Postvoid imaging is not available.    The prostate measures approximately 3.2 x 3 x 4.4 cm in size with a calculated volume of 22 cc.                                       X-Ray Chest AP Portable (Final result)  Result time 08/07/23 00:42:12      Final result by Marquise Humphries MD (08/07/23 00:42:12)                   Impression:      There is no radiographic evidence for acute intrathoracic process.      Electronically signed by: Marquise Humphries  Date:    08/07/2023  Time:    00:42               Narrative:    EXAMINATION:  XR CHEST AP PORTABLE    CLINICAL HISTORY:  Hypotension, unspecified    TECHNIQUE:  Single frontal view of the chest was performed.    COMPARISON:  Chest radiograph March 13, 2021    FINDINGS:  AP portable chest radiographic examination is submitted.  There is mild diminished depth of inspiration.  When accounting for position and technique and depth of inspiration, the appearance of the cardiomediastinal silhouette is stable.  Aortic atherosclerotic change noted.    There is no evidence for confluent infiltrate or  consolidation, significant pleural effusion or pneumothorax.    The osseous structures demonstrate chronic change.                                        Assessment/Plan:     * LAM (acute kidney injury)  Patient with acute kidney injury/acute renal failure likely due to pre-renal azotemia due to dehydration LAM is currently worsening. Baseline creatinine unknown - Labs reviewed- Renal function/electrolytes with CrCl cannot be calculated (Unknown ideal weight.). according to latest data. Monitor urine output and serial BMP and adjust therapy as needed. Avoid nephrotoxins and renally dose meds for GFR listed above.    -Presented with LAM  -Cr on admit 7.4  -Baseline Cr unknown, but Cr 1.1 in 08/2022  -Suspect due to decreased PO intake and home meds (amolidpine-olmarsartan and chlorthalidone)  -Renal US with no obstruction. Findings consistent with CKD  -IVF  -Nephrology consulted: check FENA, pthi, and uric acid  -Avoid nephrotoxins, renal dose all meds.   -Monitor Is/Os  -Monitor       Hypotension  -BP as low as 63/41 in the ED  -Suspect due to pt continuing home meds (amolidpine-olmarsartan and chlorthalidone) in setting of LAM and decreased PO intake/dehydration  -Hypotension improved with fluids  -Continue IVF  -Pt with leukocytosis, suspect reactive. Low suspicion for infection at this time.   -Echo ordered  -Hold home BP meds as above  -Monitor     Dehydration  -Continue IVF as above  -See plan for LAM    Leukocytosis  -WBC# 16.07 on admission w/hypotension. Pt afebrile. CXR with no acute process.   -Pt with leukocytosis, suspect reactive. Low suspicion for infection at this time.   -Hypotension improved with fluids  -Continue IVF  -Echo ordered  -Will monitor off abx at this time   -repeat labs in the AM     Tobacco abuse  - Patient was counseled regarding smoking for 3-10 minutes.  - Encourage smoking cessation daily  - NRT offered         Class 1 obesity with body mass index (BMI) of 31.0 to 31.9 in  adult  Body mass index is 31.17 kg/m². Morbid obesity complicates all aspects of disease management from diagnostic modalities to treatment. Weight loss encouraged and health benefits explained to patient.           VTE Risk Mitigation (From admission, onward)         Ordered     IP VTE LOW RISK PATIENT  Once         08/07/23 0211     Place sequential compression device  Until discontinued         08/07/23 0211                           Shaina Heath DO  Department of Hospital Medicine  [facility] [place] - Emergency Dept

## 2023-08-07 NOTE — SUBJECTIVE & OBJECTIVE
Past Medical History:   Diagnosis Date    Hypertension        Past Surgical History:   Procedure Laterality Date    HERNIA REPAIR         Review of patient's allergies indicates:  No Known Allergies    No current facility-administered medications on file prior to encounter.     Current Outpatient Medications on File Prior to Encounter   Medication Sig    amlodipine-olmesartan (CHERI) 5-40 mg per tablet Take 1 tablet by mouth once daily.    aspirin 81 MG Chew Take 1 tablet (81 mg total) by mouth once daily.    carvediloL (COREG) 25 MG tablet Take 25 mg by mouth 2 (two) times daily.    chlorthalidone (HYGROTEN) 25 MG Tab Take 25 mg by mouth once daily.    rosuvastatin (CRESTOR) 20 MG tablet Take 20 mg by mouth once daily.    naloxone (NARCAN) 1 mg/mL injection 2 mg (1 mg per nostril) by Nasal route as needed for opioid overdose; may repeat in 3 to 5 minutes if not effective. Call 911 (Patient not taking: Reported on 8/7/2023)    nicotine (NICODERM CQ) 7 mg/24 hr Place 1 patch onto the skin once daily.    [DISCONTINUED] famotidine (PEPCID) 20 MG tablet Take 1 tablet (20 mg total) by mouth 2 (two) times daily.    [DISCONTINUED] hydroCHLOROthiazide (HYDRODIURIL) 25 MG tablet Take 1 tablet (25 mg total) by mouth once daily.    [DISCONTINUED] lisinopriL (PRINIVIL,ZESTRIL) 40 MG tablet Take 1 tablet (40 mg total) by mouth once daily.    [DISCONTINUED] meclizine (ANTIVERT) 25 mg tablet Take 1 tablet (25 mg total) by mouth 3 (three) times daily as needed for Dizziness.     Family History    None       Tobacco Use    Smoking status: Every Day     Current packs/day: 1.00     Types: Cigarettes    Smokeless tobacco: Not on file   Substance and Sexual Activity    Alcohol use: Yes     Alcohol/week: 12.0 standard drinks of alcohol     Types: 12 Cans of beer per week    Drug use: No    Sexual activity: Not on file     Review of Systems   Constitutional:  Positive for activity change, appetite change and fatigue. Negative for chills  and fever.   HENT:  Negative for congestion and trouble swallowing.    Respiratory:  Negative for cough, chest tightness, shortness of breath and wheezing.    Cardiovascular:  Negative for chest pain, palpitations and leg swelling.   Gastrointestinal:  Negative for abdominal distention, abdominal pain, blood in stool, diarrhea, nausea and vomiting.   Genitourinary:  Negative for difficulty urinating and dysuria.   Musculoskeletal:  Negative for back pain and joint swelling.   Skin:  Negative for wound.   Neurological:  Positive for dizziness, weakness and light-headedness. Negative for headaches.   Psychiatric/Behavioral:  Negative for confusion and hallucinations.      Objective:     Vital Signs (Most Recent):  Temp: 98 °F (36.7 °C) (08/07/23 0847)  Pulse: 64 (08/07/23 1103)  Resp: (!) 21 (08/07/23 1103)  BP: 102/66 (08/07/23 1103)  SpO2: 97 % (08/07/23 1103) Vital Signs (24h Range):  Temp:  [98 °F (36.7 °C)-98.7 °F (37.1 °C)] 98 °F (36.7 °C)  Pulse:  [59-72] 64  Resp:  [16-26] 21  SpO2:  [9 %-100 %] 97 %  BP: ()/(41-66) 102/66     Weight: 93 kg (205 lb)  Body mass index is 31.17 kg/m².     Physical Exam  Vitals and nursing note reviewed.   Constitutional:       General: He is not in acute distress.     Appearance: He is obese. He is not ill-appearing.   Eyes:      Extraocular Movements: Extraocular movements intact.      Pupils: Pupils are equal, round, and reactive to light.   Cardiovascular:      Rate and Rhythm: Normal rate and regular rhythm.      Heart sounds: No murmur heard.     No gallop.   Pulmonary:      Effort: Pulmonary effort is normal. No respiratory distress.      Breath sounds: Normal breath sounds. No wheezing.   Abdominal:      General: There is no distension.      Palpations: Abdomen is soft.      Tenderness: There is no abdominal tenderness. There is no right CVA tenderness, left CVA tenderness or guarding.   Musculoskeletal:         General: No swelling or tenderness.      Right lower  leg: No edema.      Left lower leg: No edema.   Skin:     General: Skin is warm and dry.      Comments: Face appears flushed   Neurological:      General: No focal deficit present.      Mental Status: He is alert and oriented to person, place, and time.   Psychiatric:         Mood and Affect: Mood normal.         Thought Content: Thought content normal.              CRANIAL NERVES     CN III, IV, VI   Pupils are equal, round, and reactive to light.       Significant Labs: All pertinent labs within the past 24 hours have been reviewed.    CBC:   Recent Labs   Lab 08/07/23 0716   WBC 16.07*   HGB 12.9*   HCT 37.6*        CMP:   Recent Labs   Lab 08/07/23 0716   *   K 3.6   CL 95   CO2 23   *   BUN 85*   CREATININE 7.5*   CALCIUM 8.6*   ANIONGAP 14       Magnesium:   Recent Labs   Lab 08/07/23  0716   MG 2.1       Urine Studies:   Recent Labs   Lab 08/06/23  2315   COLORU Yellow   SPECGRAV >=1.030*   NITRITE Negative   UROBILINOGEN 2.0*   LEUKOCYTESUR Negative       Significant Imaging: I have reviewed all pertinent imaging results/findings within the past 24 hours.    Imaging Results              US Retroperitoneal Complete (Final result)  Result time 08/07/23 03:33:50      Final result by Marquise Humphries MD (08/07/23 03:33:50)                   Impression:      There is no evidence for hydronephrosis bilaterally.    Mild elevated resistive index of the left kidney measuring 0.72 can be seen with medical renal disease.    Nonobstructing calculus of the right kidney.    Renal cysts are noted.      Electronically signed by: Marquise Humphries  Date:    08/07/2023  Time:    03:33               Narrative:    EXAMINATION:  US RETROPERITONEAL COMPLETE    CLINICAL HISTORY:  acute renal failure;    TECHNIQUE:  Ultrasound of the kidneys and urinary bladder was performed including color flow and Doppler evaluation of the kidneys.    COMPARISON:  None.    FINDINGS:  The right kidney measures approximately  11.1 cm in length.  There is no evidence for hydronephrosis.  Color imaging demonstrates flow to the right kidney with a resistive index measurement of 0.60.  At the lower pole of the right kidney there is a 4.1 mm hyperechoic focus that may relate to a small nonobstructing calculus.  At the upper pole of the right kidney there is a hypoechoic finding measuring approximately 11 x 6.9 by 13.4 mm with posterior wall enhancement and increased through transmission, most consistent with a small cyst.    The left kidney measures approximately 13 cm in length.  There is no evidence for hydronephrosis.  Color imaging demonstrates flow to the left kidney with a resistive index measurement of 0.72.  At the mid to upper pole of the left kidney there is an exophytic hypoechoic finding measuring approximately 3.2 x 2.6 x 2.7 cm in size, consistent with a cyst.    The urinary bladder is identified measuring approximately 5 x 7.3 x 5.4 cm with a calculated volume of 102.42 mL.  Postvoid imaging is not available.    The prostate measures approximately 3.2 x 3 x 4.4 cm in size with a calculated volume of 22 cc.                                       X-Ray Chest AP Portable (Final result)  Result time 08/07/23 00:42:12      Final result by Marquise Humphries MD (08/07/23 00:42:12)                   Impression:      There is no radiographic evidence for acute intrathoracic process.      Electronically signed by: Marquise Humphries  Date:    08/07/2023  Time:    00:42               Narrative:    EXAMINATION:  XR CHEST AP PORTABLE    CLINICAL HISTORY:  Hypotension, unspecified    TECHNIQUE:  Single frontal view of the chest was performed.    COMPARISON:  Chest radiograph March 13, 2021    FINDINGS:  AP portable chest radiographic examination is submitted.  There is mild diminished depth of inspiration.  When accounting for position and technique and depth of inspiration, the appearance of the cardiomediastinal silhouette is stable.  Aortic  atherosclerotic change noted.    There is no evidence for confluent infiltrate or consolidation, significant pleural effusion or pneumothorax.    The osseous structures demonstrate chronic change.

## 2023-08-07 NOTE — ASSESSMENT & PLAN NOTE
-WBC# 16.07 on admission w/hypotension. Pt afebrile. CXR with no acute process.   -Pt with leukocytosis, suspect reactive. Low suspicion for infection at this time.   -Hypotension improved with fluids  -Continue IVF  -Echo ordered  -Will monitor off abx at this time   -repeat labs in the AM

## 2023-08-07 NOTE — ASSESSMENT & PLAN NOTE
-BP as low as 63/41 in the ED  -Suspect due to pt continuing home meds (amolidpine-olmarsartan and chlorthalidone) in setting of LAM and decreased PO intake/dehydration  -Hypotension improved with fluids  -Continue IVF  -Pt with leukocytosis, suspect reactive. Low suspicion for infection at this time.   -Echo ordered  -Hold home BP meds as above  -Monitor

## 2023-08-07 NOTE — ASSESSMENT & PLAN NOTE
Body mass index is 31.17 kg/m². Morbid obesity complicates all aspects of disease management from diagnostic modalities to treatment. Weight loss encouraged and health benefits explained to patient.

## 2023-08-07 NOTE — ED PROVIDER NOTES
Encounter Date: 8/6/2023    SCRIBE #1 NOTE: I, Emily Whitman, am scribing for, and in the presence of,  Dr. Malik Singh. I have scribed the following portions of the note - Other sections scribed: HPI/ROS/PE.       History     Chief Complaint   Patient presents with    Hypotension     Pt reports he started to have low blood pressure over a week ago   Pt is dizzy, lightheaded  Daughter reports pt is fatigued and not wanting to get out of bed. Daughter also states pt went to the restroom on himself  Pt normal BP is usually high   Daughter states doctor keeps changing pts blood pressure meds  Last time meds were changed was a month ago      Vivek Whitman is a 62 y.o. male, with a PMHx of HTN/heroin abuse/alcohol abuse, who presents to the ED with hypotension onset 1 week. Additional history is provided by independent historian: Pt daughter reports fatigue,no eating, barely drinking- pt had 2 cups of Gatorade today.  Pt is also lightheaded. Pt doesn't want to get out of bed. Pt daughter notes at home bp 110/55, in pt ED room 94/53. No other exacerbating or alleviating factors. Daughter notes pt had HTN medications changed a month ago. Pt smokes, daughter says he usually puts it out because he can't breathe. This is the extent of the patient's complaints today in the Emergency Department.            The history is provided by the patient and a relative.     Review of patient's allergies indicates:  No Known Allergies  Past Medical History:   Diagnosis Date    Hypertension      Past Surgical History:   Procedure Laterality Date    HERNIA REPAIR       History reviewed. No pertinent family history.  Social History     Tobacco Use    Smoking status: Every Day     Current packs/day: 1.00     Types: Cigarettes   Substance Use Topics    Alcohol use: Yes     Alcohol/week: 12.0 standard drinks of alcohol     Types: 12 Cans of beer per week    Drug use: No     Review of Systems   Constitutional:  Positive for appetite change  and fatigue. Negative for fever.   HENT:  Negative for sore throat.    Respiratory:  Negative for shortness of breath.    Cardiovascular:  Negative for chest pain.   Gastrointestinal:  Negative for diarrhea, nausea and vomiting.   Genitourinary:  Negative for dysuria.   Musculoskeletal:  Negative for back pain.   Skin:  Negative for rash.   Neurological:  Positive for light-headedness. Negative for weakness and headaches.   Psychiatric/Behavioral:  Negative for behavioral problems.    All other systems reviewed and are negative.      Physical Exam     Initial Vitals [08/06/23 2144]   BP Pulse Resp Temp SpO2   (!) 63/41 72 20 98.3 °F (36.8 °C) 100 %      MAP       --         Physical Exam    Nursing note and vitals reviewed.  Constitutional: He appears well-developed and well-nourished.   HENT:   Head: Normocephalic and atraumatic.   Poor dentition.   Eyes: Conjunctivae are normal.   Neck: Neck supple.   Normal range of motion.  Cardiovascular:  Normal rate, regular rhythm and normal heart sounds.     Exam reveals no gallop and no friction rub.       No murmur heard.  Pulmonary/Chest: No respiratory distress. He has no rhonchi. He has no rales.   Expiratory wheezing bilaterally.    Abdominal: Abdomen is soft. There is no abdominal tenderness.   Musculoskeletal:         General: No edema. Normal range of motion.      Cervical back: Normal range of motion and neck supple.     Neurological: He is alert and oriented to person, place, and time. GCS score is 15. GCS eye subscore is 4. GCS verbal subscore is 5. GCS motor subscore is 6.   Skin: Skin is warm and dry.   Psychiatric: He has a normal mood and affect.         ED Course   Critical Care    Date/Time: 8/7/2023 1:18 AM    Performed by: Malik Singh MD  Authorized by: Malik Singh MD  Direct patient critical care time: 10 minutes  Additional history critical care time: 10 minutes  Ordering / reviewing critical care time: 10 minutes  Documentation critical  care time: 10 minutes  Consulting other physicians critical care time: 2 minutes  Consult with family critical care time: 10 minutes  Total critical care time (exclusive of procedural time) : 52 minutes  Critical care was necessary to treat or prevent imminent or life-threatening deterioration of the following conditions: dehydration, circulatory failure, shock and cardiac failure.  Critical care was time spent personally by me on the following activities: development of treatment plan with patient or surrogate, evaluation of patient's response to treatment, examination of patient, obtaining history from patient or surrogate, ordering and performing treatments and interventions, ordering and review of laboratory studies, ordering and review of radiographic studies and re-evaluation of patient's condition.        Labs Reviewed   POCT URINALYSIS W/O SCOPE - Abnormal; Notable for the following components:       Result Value    Bilirubin, UA 1+ (*)     Spec Grav UA >=1.030 (*)     Blood, UA 2+ (*)     Protein, UA 3+ (*)     Urobilinogen, UA 2.0 (*)     All other components within normal limits   ISTAT PROCEDURE - Abnormal; Notable for the following components:    POC PCO2 45.7 (*)     POC PO2 17 (*)     POC SATURATED O2 24 (*)     All other components within normal limits   POCT CMP - Abnormal; Notable for the following components:    ALT (SGPT), POC 77 (*)     AST (SGOT), POC 83 (*)     POC BUN 91 (*)     POC Chloride 89 (*)     POC Creatinine 7.4 (*)     POC Glucose 178 (*)     All other components within normal limits   TROPONIN ISTAT   BASIC METABOLIC PANEL   MAGNESIUM   PHOSPHORUS   CBC W/ AUTO DIFFERENTIAL   CREATININE, URINE, RANDOM   SODIUM, URINE, RANDOM   POCT CBC   SARS-COV-2 RDRP GENE    Narrative:     This test utilizes isothermal nucleic acid amplification technology to detect the SARS-CoV-2 RdRp nucleic acid segment. The analytical sensitivity (limit of detection) is 500 copies/swab.     A POSITIVE result  is indicative of the presence of SARS-CoV-2 RNA; clinical correlation with patient history and other diagnostic information is necessary to determine patient infection status.    A NEGATIVE result means that SARS-CoV-2 nucleic acids are not present above the limit of detection. A NEGATIVE result should be treated as presumptive. It does not rule out the possibility of COVID-19 and should not be the sole basis for treatment decisions. If COVID-19 is strongly suspected based on clinical and exposure history, re-testing using an alternate molecular assay should be considered.     This test is only for use under the Food and Drug Administration s Emergency Use Authorization (EUA).     Commercial kits are provided by [employer]. Performance characteristics of the EUA have been independently verified by [facility] Department of Pathology and Laboratory Medicine.   _________________________________________________________________   The authorized Fact Sheet for Healthcare Providers and the authorized Fact Sheet for Patients of the ID NOW COVID-19 are available on the FDA website:    https://www.fda.gov/media/315293/download      https://www.fda.gov/media/523646/download      POCT URINALYSIS W/O SCOPE   POCT CMP   POCT TROPONIN       EKG Readings: (Independently Interpreted)   Initial Reading: No STEMI. Rhythm: Normal Sinus Rhythm. Heart Rate: 68. ST Segments: Normal ST Segments.   Nonspecific T-wave changes       Imaging Results              US Retroperitoneal Complete (Final result)  Result time 08/07/23 03:33:50      Final result by Marquise Humphries MD (08/07/23 03:33:50)                   Impression:      There is no evidence for hydronephrosis bilaterally.    Mild elevated resistive index of the left kidney measuring 0.72 can be seen with medical renal disease.    Nonobstructing calculus of the right kidney.    Renal cysts are noted.      Electronically signed by: Marquise  Kristel  Date:    08/07/2023  Time:    03:33               Narrative:    EXAMINATION:  US RETROPERITONEAL COMPLETE    CLINICAL HISTORY:  acute renal failure;    TECHNIQUE:  Ultrasound of the kidneys and urinary bladder was performed including color flow and Doppler evaluation of the kidneys.    COMPARISON:  None.    FINDINGS:  The right kidney measures approximately 11.1 cm in length.  There is no evidence for hydronephrosis.  Color imaging demonstrates flow to the right kidney with a resistive index measurement of 0.60.  At the lower pole of the right kidney there is a 4.1 mm hyperechoic focus that may relate to a small nonobstructing calculus.  At the upper pole of the right kidney there is a hypoechoic finding measuring approximately 11 x 6.9 by 13.4 mm with posterior wall enhancement and increased through transmission, most consistent with a small cyst.    The left kidney measures approximately 13 cm in length.  There is no evidence for hydronephrosis.  Color imaging demonstrates flow to the left kidney with a resistive index measurement of 0.72.  At the mid to upper pole of the left kidney there is an exophytic hypoechoic finding measuring approximately 3.2 x 2.6 x 2.7 cm in size, consistent with a cyst.    The urinary bladder is identified measuring approximately 5 x 7.3 x 5.4 cm with a calculated volume of 102.42 mL.  Postvoid imaging is not available.    The prostate measures approximately 3.2 x 3 x 4.4 cm in size with a calculated volume of 22 cc.                                       X-Ray Chest AP Portable (Final result)  Result time 08/07/23 00:42:12      Final result by Marquise Humphries MD (08/07/23 00:42:12)                   Impression:      There is no radiographic evidence for acute intrathoracic process.      Electronically signed by: Marquise Humphries  Date:    08/07/2023  Time:    00:42               Narrative:    EXAMINATION:  XR CHEST AP PORTABLE    CLINICAL HISTORY:  Hypotension,  unspecified    TECHNIQUE:  Single frontal view of the chest was performed.    COMPARISON:  Chest radiograph March 13, 2021    FINDINGS:  AP portable chest radiographic examination is submitted.  There is mild diminished depth of inspiration.  When accounting for position and technique and depth of inspiration, the appearance of the cardiomediastinal silhouette is stable.  Aortic atherosclerotic change noted.    There is no evidence for confluent infiltrate or consolidation, significant pleural effusion or pneumothorax.    The osseous structures demonstrate chronic change.                                       Medications   famotidine tablet 20 mg (has no administration in time range)   nicotine 7 mg/24 hr 1 patch (has no administration in time range)   sodium chloride 0.9% flush 10 mL (has no administration in time range)   naloxone 0.4 mg/mL injection 0.02 mg (has no administration in time range)   glucose chewable tablet 16 g (has no administration in time range)   glucose chewable tablet 24 g (has no administration in time range)   dextrose 50% injection 12.5 g (has no administration in time range)   dextrose 50% injection 25 g (has no administration in time range)   glucagon (human recombinant) injection 1 mg (has no administration in time range)   lactated ringers infusion ( Intravenous New Bag 8/7/23 0331)   ondansetron injection 4 mg (has no administration in time range)   albuterol-ipratropium 2.5 mg-0.5 mg/3 mL nebulizer solution 3 mL (has no administration in time range)   aluminum-magnesium hydroxide-simethicone 200-200-20 mg/5 mL suspension 30 mL (has no administration in time range)   sodium chloride 0.9% bolus 1,000 mL 1,000 mL (0 mLs Intravenous Stopped 8/6/23 6040)   albuterol-ipratropium 2.5 mg-0.5 mg/3 mL nebulizer solution 3 mL (3 mLs Nebulization Given 8/7/23 0043)     Medical Decision Making:   History:   Old Medical Records: I decided to obtain old medical records.  Initial Assessment:   Vivek  INÉS Whitman is a 62 y.o. male, with a PMHx of HTN/heroin abuse/alcohol abuse, who presents to the ED with hypotension onset 1 week. Additional history is provided by independent historian: Pt daughter reports fatigue,no eating, barely drinking- pt had 2 cups of Gatorade today.  Pt is also lightheaded. Pt doesn't want to get out of bed. Pt daughter notes at home bp 110/55, in pt ED room 94/53. No other exacerbating or alleviating factors. Daughter notes pt had HTN medications changed a month ago. Pt smokes, daughter says he usually puts it out because he can't breathe. This is the extent of the patient's complaints today in the Emergency Department.            Clinical Tests:   Lab Tests: Ordered and Reviewed  Medical Tests: Ordered and Reviewed  ED Management:  Vivek Whitman is a 62 y.o. male, with a PMHx of HTN/heroin abuse/alcohol abuse, who presents to the ED with hypotension onset 1 week. Additional history is provided by independent historian: Pt daughter reports fatigue,no eating, barely drinking- pt had 2 cups of Gatorade today.  Pt is also lightheaded. Pt doesn't want to get out of bed. Pt daughter notes at home bp 110/55, in pt ED room 94/53. No other exacerbating or alleviating factors. Daughter notes pt had HTN medications changed a month ago. Pt smokes, daughter says he usually puts it out because he can't breathe. This is the extent of the patient's complaints today in the Emergency Department.    Course of ED stay:  1. Cardiovascular-patient has been chest pain-free throughout ED stay.  Troponin is normal x1 and EKG shows no acute disease.  Blood pressures have been much improved after fluid bolus.  2. Pulmonary-patient has had normal O2 sats on room air.  DuoNeb was given secondary to expiratory wheezes.  Chest x-ray was ordered.  3. Hematology/infectious disease-patient has been afebrile.  CBC shows elevated white blood cell count (16,700), but was otherwise reassuring.  COVID-19 screen was negative.   Urinalysis shows no signs of infection, but urine was very concentrated, consistent with patient's history of decreased oral fluid intake.  4. GI/nutrition/renal/endocrine-CMP shows markedly elevated creatinine (7.4) from previously normal creatinine (1.17) in August 2022.  Normal saline bolus was given and patient states he feels much better after fluids.  Plan is to admit to Hospital Medicine for acute kidney injury.          Scribe Attestation:   Scribe #1: I performed the above scribed service and the documentation accurately describes the services I performed. I attest to the accuracy of the note.                     Clinical Impression:   Final diagnoses:  [I95.9] Hypotension  [N17.9] LAM (acute kidney injury) (Primary)  [E86.0] Dehydration  [F11.10] Heroin abuse (Chronic)  [F10.10] Alcohol abuse        ED Disposition Condition    Admit Stable        I, Dr. Singh, personally performed the services described in this documentation. All medical record entries made by the scribe were at my direction and in my presence. I have reviewed the chart and agree that the record reflects my personal performance and is accurate and complete.      Malik Singh MD  08/07/23 0359

## 2023-08-07 NOTE — ED NOTES
Pt went into A-fib. Pt denies SOB, chest pain or palpitations. EKG obtained and saved onto chart. Dr. Emerson contacted

## 2023-08-07 NOTE — ASSESSMENT & PLAN NOTE
- Patient was counseled regarding smoking for 3-10 minutes.  - Encourage smoking cessation daily  - NRT offered

## 2023-08-07 NOTE — ED NOTES
Pt sent over from [facility] for LAM, pt is aaox3. Nadn. Resp eu, on 2L O2 via NC. NSR on monitor. Receiving 125ml/hr LR. Denies any cp, sob, fever, n/v/d, urinary symptoms. No complaints at this time. Call light w/in reach. Will continue to monitor

## 2023-08-07 NOTE — PHARMACY MED REC
"  Admission Medication History     The home medication history was taken by Estefanía Amor CPhT.      You may go to "Admission" then "Reconcile Home Medications" tabs to review and/or act upon these items.     The home medication list has been updated by the Pharmacy department.   Please read ALL comments highlighted in yellow.   Please address this information as you see fit.    Feel free to contact us if you have any questions or require assistance.      The medications listed below were removed from the home medication list. Please reorder if appropriate:  Patient reports no longer taking the following medication(s):  Hydrodiuril 25 mg tab  Pepcid 20 mg tab  Antivert 25 mg tab      Medications listed below were obtained from: Patient/family and Analytic software- [employer]  (Not in a hospital admission)      Potential issues to be addressed PRIOR TO DISCHARGE  Patient reported not taking the following medications: (narcan 1mg/mL injection). These medications remain on the home medication list. Please address accordingly.           Estefanía Amor CPhT.  [phone]              .        "

## 2023-08-07 NOTE — ASSESSMENT & PLAN NOTE
Patient with acute kidney injury/acute renal failure likely due to pre-renal azotemia due to dehydration LAM is currently worsening. Baseline creatinine unknown - Labs reviewed- Renal function/electrolytes with CrCl cannot be calculated (Unknown ideal weight.). according to latest data. Monitor urine output and serial BMP and adjust therapy as needed. Avoid nephrotoxins and renally dose meds for GFR listed above.    -Presented with LAM  -Cr on admit 7.4  -Baseline Cr unknown, but Cr 1.1 in 08/2022  -Suspect due to decreased PO intake and home meds (amolidpine-olmarsartan and chlorthalidone)  -Renal US with no obstruction. Findings consistent with CKD  -IVF  -Nephrology consulted: check FENA, pthi, and uric acid  -Avoid nephrotoxins, renal dose all meds.   -Monitor Is/Os  -Monitor

## 2023-08-08 ENCOUNTER — ANESTHESIA EVENT (OUTPATIENT)
Dept: CARDIOLOGY | Facility: HOSPITAL | Age: 63
DRG: 872 | End: 2023-08-08
Payer: MEDICAID

## 2023-08-08 ENCOUNTER — ANESTHESIA (OUTPATIENT)
Dept: CARDIOLOGY | Facility: HOSPITAL | Age: 63
DRG: 872 | End: 2023-08-08
Payer: MEDICAID

## 2023-08-08 PROBLEM — A41.9 SEPSIS: Status: ACTIVE | Noted: 2023-08-08

## 2023-08-08 LAB
ANION GAP SERPL CALC-SCNC: 15 MMOL/L (ref 8–16)
APTT PPP: 43.3 SEC (ref 21–32)
BASOPHILS # BLD AUTO: 0.03 K/UL (ref 0–0.2)
BASOPHILS # BLD AUTO: 0.03 K/UL (ref 0–0.2)
BASOPHILS NFR BLD: 0.2 % (ref 0–1.9)
BASOPHILS NFR BLD: 0.2 % (ref 0–1.9)
BSA FOR ECHO PROCEDURE: 2.11 M2
BUN SERPL-MCNC: 84 MG/DL (ref 8–23)
CALCIUM SERPL-MCNC: 8.7 MG/DL (ref 8.7–10.5)
CHLORIDE SERPL-SCNC: 94 MMOL/L (ref 95–110)
CO2 SERPL-SCNC: 23 MMOL/L (ref 23–29)
CORTIS SERPL-MCNC: 16.7 UG/DL
CREAT SERPL-MCNC: 6.9 MG/DL (ref 0.5–1.4)
CRP SERPL-MCNC: 157.9 MG/L (ref 0–8.2)
DIFFERENTIAL METHOD: ABNORMAL
DIFFERENTIAL METHOD: ABNORMAL
EOSINOPHIL # BLD AUTO: 0 K/UL (ref 0–0.5)
EOSINOPHIL # BLD AUTO: 0 K/UL (ref 0–0.5)
EOSINOPHIL NFR BLD: 0.2 % (ref 0–8)
EOSINOPHIL NFR BLD: 0.2 % (ref 0–8)
ERYTHROCYTE [DISTWIDTH] IN BLOOD BY AUTOMATED COUNT: 14.1 % (ref 11.5–14.5)
ERYTHROCYTE [DISTWIDTH] IN BLOOD BY AUTOMATED COUNT: 14.1 % (ref 11.5–14.5)
EST. GFR  (NO RACE VARIABLE): 8 ML/MIN/1.73 M^2
GLUCOSE SERPL-MCNC: 108 MG/DL (ref 70–110)
HCT VFR BLD AUTO: 31.6 % (ref 40–54)
HCT VFR BLD AUTO: 31.6 % (ref 40–54)
HGB BLD-MCNC: 10.8 G/DL (ref 14–18)
HGB BLD-MCNC: 10.8 G/DL (ref 14–18)
IMM GRANULOCYTES # BLD AUTO: 0.11 K/UL (ref 0–0.04)
IMM GRANULOCYTES # BLD AUTO: 0.11 K/UL (ref 0–0.04)
IMM GRANULOCYTES NFR BLD AUTO: 0.8 % (ref 0–0.5)
IMM GRANULOCYTES NFR BLD AUTO: 0.8 % (ref 0–0.5)
LACTATE SERPL-SCNC: 0.8 MMOL/L (ref 0.5–2.2)
LYMPHOCYTES # BLD AUTO: 1.2 K/UL (ref 1–4.8)
LYMPHOCYTES # BLD AUTO: 1.2 K/UL (ref 1–4.8)
LYMPHOCYTES NFR BLD: 9.3 % (ref 18–48)
LYMPHOCYTES NFR BLD: 9.3 % (ref 18–48)
MAGNESIUM SERPL-MCNC: 1.9 MG/DL (ref 1.6–2.6)
MCH RBC QN AUTO: 30.3 PG (ref 27–31)
MCH RBC QN AUTO: 30.3 PG (ref 27–31)
MCHC RBC AUTO-ENTMCNC: 34.2 G/DL (ref 32–36)
MCHC RBC AUTO-ENTMCNC: 34.2 G/DL (ref 32–36)
MCV RBC AUTO: 89 FL (ref 82–98)
MCV RBC AUTO: 89 FL (ref 82–98)
MONOCYTES # BLD AUTO: 1.2 K/UL (ref 0.3–1)
MONOCYTES # BLD AUTO: 1.2 K/UL (ref 0.3–1)
MONOCYTES NFR BLD: 9 % (ref 4–15)
MONOCYTES NFR BLD: 9 % (ref 4–15)
NEUTROPHILS # BLD AUTO: 10.6 K/UL (ref 1.8–7.7)
NEUTROPHILS # BLD AUTO: 10.6 K/UL (ref 1.8–7.7)
NEUTROPHILS NFR BLD: 80.5 % (ref 38–73)
NEUTROPHILS NFR BLD: 80.5 % (ref 38–73)
NRBC BLD-RTO: 0 /100 WBC
NRBC BLD-RTO: 0 /100 WBC
PHOSPHATE SERPL-MCNC: 5.7 MG/DL (ref 2.7–4.5)
PLATELET # BLD AUTO: 169 K/UL (ref 150–450)
PLATELET # BLD AUTO: 169 K/UL (ref 150–450)
PMV BLD AUTO: 10.4 FL (ref 9.2–12.9)
PMV BLD AUTO: 10.4 FL (ref 9.2–12.9)
POTASSIUM SERPL-SCNC: 3.4 MMOL/L (ref 3.5–5.1)
PROCALCITONIN SERPL IA-MCNC: 0.6 NG/ML
RBC # BLD AUTO: 3.56 M/UL (ref 4.6–6.2)
RBC # BLD AUTO: 3.56 M/UL (ref 4.6–6.2)
SODIUM SERPL-SCNC: 132 MMOL/L (ref 136–145)
WBC # BLD AUTO: 13.16 K/UL (ref 3.9–12.7)
WBC # BLD AUTO: 13.16 K/UL (ref 3.9–12.7)

## 2023-08-08 PROCEDURE — 37000008 HC ANESTHESIA 1ST 15 MINUTES: Performed by: INTERNAL MEDICINE

## 2023-08-08 PROCEDURE — 63600175 PHARM REV CODE 636 W HCPCS: Performed by: STUDENT IN AN ORGANIZED HEALTH CARE EDUCATION/TRAINING PROGRAM

## 2023-08-08 PROCEDURE — 25000242 PHARM REV CODE 250 ALT 637 W/ HCPCS: Performed by: INTERNAL MEDICINE

## 2023-08-08 PROCEDURE — 37000009 HC ANESTHESIA EA ADD 15 MINS: Performed by: INTERNAL MEDICINE

## 2023-08-08 PROCEDURE — 87040 BLOOD CULTURE FOR BACTERIA: CPT | Performed by: STUDENT IN AN ORGANIZED HEALTH CARE EDUCATION/TRAINING PROGRAM

## 2023-08-08 PROCEDURE — 25000003 PHARM REV CODE 250: Performed by: STUDENT IN AN ORGANIZED HEALTH CARE EDUCATION/TRAINING PROGRAM

## 2023-08-08 PROCEDURE — 36415 COLL VENOUS BLD VENIPUNCTURE: CPT | Performed by: STUDENT IN AN ORGANIZED HEALTH CARE EDUCATION/TRAINING PROGRAM

## 2023-08-08 PROCEDURE — 86140 C-REACTIVE PROTEIN: CPT | Performed by: INTERNAL MEDICINE

## 2023-08-08 PROCEDURE — S4991 NICOTINE PATCH NONLEGEND: HCPCS | Performed by: INTERNAL MEDICINE

## 2023-08-08 PROCEDURE — 63600175 PHARM REV CODE 636 W HCPCS: Performed by: INTERNAL MEDICINE

## 2023-08-08 PROCEDURE — 92960 PR CARDIOVERSION, ELECTIVE;EXTERN: ICD-10-PCS | Mod: ,,, | Performed by: INTERNAL MEDICINE

## 2023-08-08 PROCEDURE — 99291 PR CRITICAL CARE, E/M 30-74 MINUTES: ICD-10-PCS | Mod: 25,,, | Performed by: INTERNAL MEDICINE

## 2023-08-08 PROCEDURE — 94640 AIRWAY INHALATION TREATMENT: CPT

## 2023-08-08 PROCEDURE — D9220A PRA ANESTHESIA: ICD-10-PCS | Mod: ANES,,, | Performed by: ANESTHESIOLOGY

## 2023-08-08 PROCEDURE — 85025 COMPLETE CBC W/AUTO DIFF WBC: CPT | Performed by: INTERNAL MEDICINE

## 2023-08-08 PROCEDURE — 82533 TOTAL CORTISOL: CPT | Performed by: STUDENT IN AN ORGANIZED HEALTH CARE EDUCATION/TRAINING PROGRAM

## 2023-08-08 PROCEDURE — 25000003 PHARM REV CODE 250: Performed by: INTERNAL MEDICINE

## 2023-08-08 PROCEDURE — 80048 BASIC METABOLIC PNL TOTAL CA: CPT | Performed by: INTERNAL MEDICINE

## 2023-08-08 PROCEDURE — D9220A PRA ANESTHESIA: ICD-10-PCS | Mod: CRNA,,, | Performed by: STUDENT IN AN ORGANIZED HEALTH CARE EDUCATION/TRAINING PROGRAM

## 2023-08-08 PROCEDURE — 85730 THROMBOPLASTIN TIME PARTIAL: CPT | Performed by: STUDENT IN AN ORGANIZED HEALTH CARE EDUCATION/TRAINING PROGRAM

## 2023-08-08 PROCEDURE — 92960 CARDIOVERSION ELECTRIC EXT: CPT | Mod: ,,, | Performed by: INTERNAL MEDICINE

## 2023-08-08 PROCEDURE — 94761 N-INVAS EAR/PLS OXIMETRY MLT: CPT

## 2023-08-08 PROCEDURE — 84145 PROCALCITONIN (PCT): CPT | Performed by: STUDENT IN AN ORGANIZED HEALTH CARE EDUCATION/TRAINING PROGRAM

## 2023-08-08 PROCEDURE — D9220A PRA ANESTHESIA: Mod: ANES,,, | Performed by: ANESTHESIOLOGY

## 2023-08-08 PROCEDURE — 20000000 HC ICU ROOM

## 2023-08-08 PROCEDURE — 92960 CARDIOVERSION ELECTRIC EXT: CPT | Performed by: INTERNAL MEDICINE

## 2023-08-08 PROCEDURE — 83605 ASSAY OF LACTIC ACID: CPT | Performed by: STUDENT IN AN ORGANIZED HEALTH CARE EDUCATION/TRAINING PROGRAM

## 2023-08-08 PROCEDURE — 84100 ASSAY OF PHOSPHORUS: CPT | Performed by: INTERNAL MEDICINE

## 2023-08-08 PROCEDURE — 99291 CRITICAL CARE FIRST HOUR: CPT | Mod: 25,,, | Performed by: INTERNAL MEDICINE

## 2023-08-08 PROCEDURE — D9220A PRA ANESTHESIA: Mod: CRNA,,, | Performed by: STUDENT IN AN ORGANIZED HEALTH CARE EDUCATION/TRAINING PROGRAM

## 2023-08-08 PROCEDURE — 36415 COLL VENOUS BLD VENIPUNCTURE: CPT | Performed by: INTERNAL MEDICINE

## 2023-08-08 PROCEDURE — 83735 ASSAY OF MAGNESIUM: CPT | Performed by: INTERNAL MEDICINE

## 2023-08-08 RX ORDER — ALLOPURINOL 100 MG/1
200 TABLET ORAL DAILY
Status: DISCONTINUED | OUTPATIENT
Start: 2023-08-08 | End: 2023-08-09

## 2023-08-08 RX ORDER — VASOPRESSIN 20 [USP'U]/ML
INJECTION, SOLUTION INTRAMUSCULAR; SUBCUTANEOUS
Status: DISCONTINUED | OUTPATIENT
Start: 2023-08-08 | End: 2023-08-08

## 2023-08-08 RX ORDER — LIDOCAINE HYDROCHLORIDE 20 MG/ML
INJECTION INTRAVENOUS
Status: DISCONTINUED | OUTPATIENT
Start: 2023-08-08 | End: 2023-08-08

## 2023-08-08 RX ORDER — ETOMIDATE 2 MG/ML
INJECTION INTRAVENOUS
Status: DISCONTINUED | OUTPATIENT
Start: 2023-08-08 | End: 2023-08-08

## 2023-08-08 RX ORDER — MUPIROCIN 20 MG/G
OINTMENT TOPICAL 2 TIMES DAILY
Status: DISPENSED | OUTPATIENT
Start: 2023-08-08 | End: 2023-08-13

## 2023-08-08 RX ORDER — DOXYCYCLINE HYCLATE 100 MG
100 TABLET ORAL EVERY 12 HOURS
Status: DISCONTINUED | OUTPATIENT
Start: 2023-08-08 | End: 2023-08-15 | Stop reason: HOSPADM

## 2023-08-08 RX ORDER — LIDOCAINE HYDROCHLORIDE 40 MG/ML
SOLUTION TOPICAL
Status: DISCONTINUED | OUTPATIENT
Start: 2023-08-08 | End: 2023-08-08

## 2023-08-08 RX ORDER — PROPOFOL 10 MG/ML
VIAL (ML) INTRAVENOUS
Status: DISCONTINUED | OUTPATIENT
Start: 2023-08-08 | End: 2023-08-08

## 2023-08-08 RX ADMIN — ETOMIDATE 4 MG: 2 INJECTION, SOLUTION INTRAVENOUS at 12:08

## 2023-08-08 RX ADMIN — DOXYCYCLINE HYCLATE 100 MG: 100 TABLET, COATED ORAL at 05:08

## 2023-08-08 RX ADMIN — ATORVASTATIN CALCIUM 40 MG: 40 TABLET, FILM COATED ORAL at 09:08

## 2023-08-08 RX ADMIN — PROPOFOL 100 MG: 10 INJECTION, EMULSION INTRAVENOUS at 12:08

## 2023-08-08 RX ADMIN — IPRATROPIUM BROMIDE AND ALBUTEROL SULFATE 3 ML: 2.5; .5 SOLUTION RESPIRATORY (INHALATION) at 08:08

## 2023-08-08 RX ADMIN — IPRATROPIUM BROMIDE AND ALBUTEROL SULFATE 3 ML: 2.5; .5 SOLUTION RESPIRATORY (INHALATION) at 01:08

## 2023-08-08 RX ADMIN — ASPIRIN 81 MG: 81 TABLET, COATED ORAL at 02:08

## 2023-08-08 RX ADMIN — MUPIROCIN: 20 OINTMENT TOPICAL at 09:08

## 2023-08-08 RX ADMIN — LIDOCAINE HYDROCHLORIDE 4 ML: 40 SOLUTION TOPICAL at 12:08

## 2023-08-08 RX ADMIN — CEFEPIME 1 G: 1 INJECTION, POWDER, FOR SOLUTION INTRAMUSCULAR; INTRAVENOUS at 10:08

## 2023-08-08 RX ADMIN — APIXABAN 5 MG: 5 TABLET, FILM COATED ORAL at 09:08

## 2023-08-08 RX ADMIN — AMIODARONE HYDROCHLORIDE 0.5 MG/MIN: 1.8 INJECTION, SOLUTION INTRAVENOUS at 01:08

## 2023-08-08 RX ADMIN — SODIUM CHLORIDE, POTASSIUM CHLORIDE, SODIUM LACTATE AND CALCIUM CHLORIDE: 600; 310; 30; 20 INJECTION, SOLUTION INTRAVENOUS at 01:08

## 2023-08-08 RX ADMIN — IPRATROPIUM BROMIDE AND ALBUTEROL SULFATE 3 ML: 2.5; .5 SOLUTION RESPIRATORY (INHALATION) at 07:08

## 2023-08-08 RX ADMIN — VASOPRESSIN 2 UNITS: 20 INJECTION INTRAVENOUS at 01:08

## 2023-08-08 RX ADMIN — ALLOPURINOL 200 MG: 100 TABLET ORAL at 02:08

## 2023-08-08 RX ADMIN — NICOTINE 1 PATCH: 7 PATCH, EXTENDED RELEASE TRANSDERMAL at 09:08

## 2023-08-08 RX ADMIN — CEFEPIME 1 G: 1 INJECTION, POWDER, FOR SOLUTION INTRAMUSCULAR; INTRAVENOUS at 09:08

## 2023-08-08 RX ADMIN — LIDOCAINE HYDROCHLORIDE 100 MG: 20 INJECTION, SOLUTION INTRAVENOUS at 12:08

## 2023-08-08 RX ADMIN — IPRATROPIUM BROMIDE AND ALBUTEROL SULFATE 3 ML: 2.5; .5 SOLUTION RESPIRATORY (INHALATION) at 12:08

## 2023-08-08 NOTE — ASSESSMENT & PLAN NOTE
afib with rvr. Likely ppted by lolis.  Heparin drip. amio drip for rate control.   Silver cv in am if continues to be in afib  Not a candidate for long term amio because of severe pulm disease.    8/8:  Continues to be in AFib with RVR.  Will do SILVER and cardioversion today    -No absolute contraindications of esophageal stricture, tumor, perforation, laceration,or diverticulum and/or active GI bleed  -The risks, benefits & alternatives of the procedure were explained to the patient.   -The risks of transesophageal echo include but are not limited to:  Dental trauma, esophageal trauma/perforation, bleeding, laryngospasm/brochospasm, aspiration, sore throat/hoarseness, & dislodgement of the endotracheal tube/nasogastric tube (where applicable).    -The risks of ANES monitored sedation include hypotension, respiratory depression, arrhythmias, bronchospasm, & death.    -Informed consent was obtained. The patient is agreeable to proceed with the procedure and all questions and concerns addressed.      Risks, benefits and alternatives of the SILVER/Cardioversion procedure were discussed with the patient including throat irritation, aspiration, anesthetic complications, esophageal irritation/perforation, skin irritation, arrhythmia, etc.  Patient understands and agrees to proceed.  Consent was placed on the chart.

## 2023-08-08 NOTE — PROGRESS NOTES
61 yo male active smoker w/ PMHx HTN, ? CKD, Heroine abuse here w/ SOB when noted to be hypotensive and in AF w/ RVR.     Cr noted to 7.1  Pt was on HCTZ and ARB at home w/ decreased PO intake.     Now s/p IV hydration, f/up UOP.     Pt has been evaluated by cardio and nephro at the bedside.   ECHO shows good EF.     Nicotine patch noted.   Hep gtt and Amiodarone gtt done for AF rate control and anticoagulation.     DVT prophy.   Nephrotoxic agents and diuretics on hold.   Minimal hypoxia noted.     Please call us for further assistance.

## 2023-08-08 NOTE — SUBJECTIVE & OBJECTIVE
Interval History:  Continues to be in AFib with RVR today.  On amiodarone drip      Past Medical History:   Diagnosis Date    Hypertension        Past Surgical History:   Procedure Laterality Date    HERNIA REPAIR         Review of patient's allergies indicates:  No Known Allergies    No current facility-administered medications on file prior to encounter.     Current Outpatient Medications on File Prior to Encounter   Medication Sig    amlodipine-olmesartan (CHERI) 5-40 mg per tablet Take 1 tablet by mouth once daily.    aspirin 81 MG Chew Take 1 tablet (81 mg total) by mouth once daily.    carvediloL (COREG) 25 MG tablet Take 25 mg by mouth 2 (two) times daily.    chlorthalidone (HYGROTEN) 25 MG Tab Take 25 mg by mouth once daily.    rosuvastatin (CRESTOR) 20 MG tablet Take 20 mg by mouth once daily.    naloxone (NARCAN) 1 mg/mL injection 2 mg (1 mg per nostril) by Nasal route as needed for opioid overdose; may repeat in 3 to 5 minutes if not effective. Call 911 (Patient not taking: Reported on 8/7/2023)    nicotine (NICODERM CQ) 7 mg/24 hr Place 1 patch onto the skin once daily.     Family History    None       Tobacco Use    Smoking status: Every Day     Current packs/day: 1.00     Types: Cigarettes    Smokeless tobacco: Not on file   Substance and Sexual Activity    Alcohol use: Yes     Alcohol/week: 12.0 standard drinks of alcohol     Types: 12 Cans of beer per week    Drug use: No    Sexual activity: Not on file     Review of Systems   Constitutional: Positive for malaise/fatigue.   HENT: Negative.     Eyes: Negative.    Cardiovascular:  Positive for dyspnea on exertion and palpitations.   Respiratory:  Positive for shortness of breath.    Endocrine: Negative.    Hematologic/Lymphatic: Negative.    Skin: Negative.    Musculoskeletal: Negative.    Gastrointestinal: Negative.    Genitourinary: Negative.    Neurological: Negative.    Psychiatric/Behavioral: Negative.     Allergic/Immunologic: Negative.       Objective:     Vital Signs (Most Recent):  Temp: 98.7 °F (37.1 °C) (08/08/23 0300)  Pulse: 104 (08/08/23 0940)  Resp: 17 (08/08/23 0940)  BP: (!) 113/57 (08/08/23 0940)  SpO2: (!) 92 % (08/08/23 0940) Vital Signs (24h Range):  Temp:  [98.3 °F (36.8 °C)-98.7 °F (37.1 °C)] 98.7 °F (37.1 °C)  Pulse:  [] 104  Resp:  [0-34] 17  SpO2:  [75 %-99 %] 92 %  BP: ()/(49-97) 113/57     Weight: 93 kg (205 lb 0.4 oz)  Body mass index is 31.17 kg/m².    SpO2: (!) 92 %         Intake/Output Summary (Last 24 hours) at 8/8/2023 0954  Last data filed at 8/8/2023 0600  Gross per 24 hour   Intake --   Output 850 ml   Net -850 ml         Lines/Drains/Airways       Peripheral Intravenous Line  Duration                  Peripheral IV - Single Lumen 08/06/23 2150 18 G Right Antecubital 1 day                     Physical Exam  Vitals reviewed.   Constitutional:       Appearance: He is well-developed.   HENT:      Head: Normocephalic.   Eyes:      Conjunctiva/sclera: Conjunctivae normal.      Pupils: Pupils are equal, round, and reactive to light.   Cardiovascular:      Rate and Rhythm: Normal rate and regular rhythm.      Heart sounds: Normal heart sounds.   Pulmonary:      Effort: Pulmonary effort is normal.      Breath sounds: Normal breath sounds.   Abdominal:      General: Bowel sounds are normal.      Palpations: Abdomen is soft.   Musculoskeletal:      Cervical back: Normal range of motion and neck supple.   Skin:     General: Skin is warm.   Neurological:      Mental Status: He is alert and oriented to person, place, and time.          Significant Labs: BMP:   Recent Labs   Lab 08/07/23  0716 08/07/23  1202 08/08/23  0522   * 132*  132* 108   * 133*  133* 132*   K 3.6 3.5  3.5 3.4*   CL 95 94*  94* 94*   CO2 23 26 26 23   BUN 85* 87*  87* 84*   CREATININE 7.5* 7.5*  7.5* 6.9*   CALCIUM 8.6* 8.6*  8.6* 8.7   MG 2.1  --  1.9     , CMP   Recent Labs   Lab 08/07/23  0716 08/07/23  1202 08/08/23  0522  "  * 133*  133* 132*   K 3.6 3.5  3.5 3.4*   CL 95 94*  94* 94*   CO2 23 26  26 23   * 132*  132* 108   BUN 85* 87*  87* 84*   CREATININE 7.5* 7.5*  7.5* 6.9*   CALCIUM 8.6* 8.6*  8.6* 8.7   PROT  --  6.3  6.3  --    ALBUMIN  --  2.2*  2.2*  --    BILITOT  --  0.8  0.8  --    ALKPHOS  --  87  87  --    AST  --  66*  66*  --    ALT  --  73*  73*  --    ANIONGAP 14 13  13 15     , CBC   Recent Labs   Lab 08/07/23  0716 08/07/23 1936 08/08/23  0522   WBC 16.07* 15.37* 13.16*  13.16*   HGB 12.9* 13.0* 10.8*  10.8*   HCT 37.6* 37.3* 31.6*  31.6*    186 169  169     , INR   Recent Labs   Lab 08/07/23 1936   INR 1.3*     , Lipid Panel No results for input(s): "CHOL", "HDL", "LDLCALC", "TRIG", "CHOLHDL" in the last 48 hours., Troponin No results for input(s): "TROPONINI" in the last 48 hours., and All pertinent lab results from the last 24 hours have been reviewed.    Significant Imaging: Echocardiogram: Transthoracic echo (TTE) complete (Cupid Only):   Results for orders placed or performed during the hospital encounter of 08/06/23   Echo   Result Value Ref Range    LVOT stroke volume 93.88 cm3    LVIDd 4.57 3.5 - 6.0 cm    LV Systolic Volume 33.45 mL    LVIDs 2.95 2.1 - 4.0 cm    LV Diastolic Volume 95.78 mL    IVS 1.29 (A) 0.6 - 1.1 cm    LVOT diameter 2.01 cm    LVOT area 3.2 cm2    FS 35 28 - 44 %    Left Ventricle Relative Wall Thickness 0.56 cm    Posterior Wall 1.28 (A) 0.6 - 1.1 cm    LV mass 224.03 g    MV Peak E Gonzalez 0.79 m/s    TDI LATERAL 0.07 m/s    TDI SEPTAL 0.05 m/s    E/E' ratio 13.17 m/s    MV Peak A Gonzalez 0.79 m/s    TR Max Gonzalez 2.24 m/s    E/A ratio 1.00     IVRT 140.82 msec    E wave deceleration time 207.32 msec    LV SEPTAL E/E' RATIO 15.80 m/s    LV LATERAL E/E' RATIO 11.29 m/s    LVOT peak gonzalez 1.31 m/s    Left Ventricular Outflow Tract Mean Velocity 0.82 cm/s    Left Ventricular Outflow Tract Mean Gradient 3.37 mmHg    LA size 4.11 cm    Left Atrium Major Axis " 5.80 cm    Left Atrium Minor Axis 5.83 cm    RV S' 16.17 cm/s    TAPSE 2.14 cm    RA Major Axis 6.64 cm    AV mean gradient 9 mmHg    AV peak gradient 17 mmHg    Ao peak ginny 2.06 m/s    Ao VTI 37.70 cm    LVOT peak VTI 29.60 cm    AV valve area 2.49 cm²    AV Velocity Ratio 0.64     AV index (prosthetic) 0.79     DELMER by Velocity Ratio 2.02 cm²    MV mean gradient 1 mmHg    MV peak gradient 4 mmHg    MV stenosis pressure 1/2 time 60.12 ms    MV valve area p 1/2 method 3.66 cm2    MV valve area by continuity eq 3.48 cm2    MV VTI 27.0 cm    Triscuspid Valve Regurgitation Peak Gradient 20 mmHg    PV PEAK VELOCITY 1.07 m/s    PV peak gradient 5 mmHg    Sinus 3.55 cm    STJ 1.93 cm    Ascending aorta 2.73 cm    IVC diameter 1.74 cm    Mean e' 0.06 m/s    RVDD 4.55 cm    LA volume 97.51 cm3    LA WIDTH 4.8 cm    RA Width 4.2 cm    TV resting pulmonary artery pressure 23 mmHg    RV TB RVSP 5 mmHg    Est. RA pres 3 mmHg    Narrative      Left Ventricle: The left ventricle is normal in size. Mildly increased   wall thickness. Normal wall motion. There is normal systolic function with   a visually estimated ejection fraction of 65 - 70%. Grade I diastolic   dysfunction.    Left Atrium: Left atrium is severely dilated.    Right Ventricle: Normal right ventricular cavity size. Systolic   function is normal.    Right Atrium: Right atrium is moderately dilated.    Aortic Valve: There is aortic valve sclerosis.    Tricuspid Valve: There is mild regurgitation.    Pulmonary Artery: The estimated pulmonary artery systolic pressure is   23 mmHg.    IVC/SVC: Normal venous pressure at 3 mmHg.

## 2023-08-08 NOTE — PROGRESS NOTES
Awake alert oriented nad    For cardioversion today    Denies CNS ENT CP GI  RHEUM OR DERM SX  Past Medical History:   Diagnosis Date    Hypertension      Past Surgical History:   Procedure Laterality Date    HERNIA REPAIR       Social History     Socioeconomic History    Marital status: Single   Tobacco Use    Smoking status: Every Day     Current packs/day: 1.00     Types: Cigarettes   Substance and Sexual Activity    Alcohol use: Yes     Alcohol/week: 12.0 standard drinks of alcohol     Types: 12 Cans of beer per week    Drug use: No     History reviewed. No pertinent family history.,  Review of patient's allergies indicates:  No Known Allergies    Current Facility-Administered Medications   Medication    albuterol-ipratropium 2.5 mg-0.5 mg/3 mL nebulizer solution 3 mL    aluminum-magnesium hydroxide-simethicone 200-200-20 mg/5 mL suspension 30 mL    amiodarone 360 mg/200 mL (1.8 mg/mL) infusion    aspirin EC tablet 81 mg    atorvastatin tablet 40 mg    ceFEPIme (MAXIPIME) 1 g in dextrose 5 % in water (D5W) 100 mL IVPB (MB+)    dextrose 50% injection 12.5 g    dextrose 50% injection 25 g    doxycycline tablet 100 mg    EPINEPHrine (ADRENALIN) 5 mg in dextrose 5 % (D5W) 250 mL infusion    famotidine tablet 20 mg    glucagon (human recombinant) injection 1 mg    glucose chewable tablet 16 g    glucose chewable tablet 24 g    heparin 25,000 units in dextrose 5% (100 units/ml) IV bolus from bag - ADDITIONAL PRN BOLUS - 30 units/kg    heparin 25,000 units in dextrose 5% (100 units/ml) IV bolus from bag - ADDITIONAL PRN BOLUS - 60 units/kg    heparin 25,000 units in dextrose 5% 250 mL (100 units/mL) infusion LOW INTENSITY nomogram - [facility]    lactated ringers infusion    mupirocin 2 % ointment    naloxone 0.4 mg/mL injection 0.02 mg    nicotine 7 mg/24 hr 1 patch    ondansetron injection 4 mg    sodium chloride 0.9% flush 10 mL       LABS    Recent Results (from the past 24 hour(s))   APTT    Collection Time: 08/07/23   7:36 PM   Result Value Ref Range    aPTT 28.9 21.0 - 32.0 sec   Protime-INR    Collection Time: 08/07/23  7:36 PM   Result Value Ref Range    Prothrombin Time 13.5 (H) 9.0 - 12.5 sec    INR 1.3 (H) 0.8 - 1.2   CBC auto differential    Collection Time: 08/07/23  7:36 PM   Result Value Ref Range    WBC 15.37 (H) 3.90 - 12.70 K/uL    RBC 4.29 (L) 4.60 - 6.20 M/uL    Hemoglobin 13.0 (L) 14.0 - 18.0 g/dL    Hematocrit 37.3 (L) 40.0 - 54.0 %    MCV 87 82 - 98 fL    MCH 30.3 27.0 - 31.0 pg    MCHC 34.9 32.0 - 36.0 g/dL    RDW 14.0 11.5 - 14.5 %    Platelets 186 150 - 450 K/uL    MPV 9.9 9.2 - 12.9 fL    Immature Granulocytes 0.9 (H) 0.0 - 0.5 %    Gran # (ANC) 12.7 (H) 1.8 - 7.7 K/uL    Immature Grans (Abs) 0.14 (H) 0.00 - 0.04 K/uL    Lymph # 1.2 1.0 - 4.8 K/uL    Mono # 1.3 (H) 0.3 - 1.0 K/uL    Eos # 0.0 0.0 - 0.5 K/uL    Baso # 0.03 0.00 - 0.20 K/uL    nRBC 0 0 /100 WBC    Gran % 82.4 (H) 38.0 - 73.0 %    Lymph % 7.9 (L) 18.0 - 48.0 %    Mono % 8.5 4.0 - 15.0 %    Eosinophil % 0.1 0.0 - 8.0 %    Basophil % 0.2 0.0 - 1.9 %    Differential Method Automated    APTT    Collection Time: 08/08/23  1:56 AM   Result Value Ref Range    aPTT 43.3 (H) 21.0 - 32.0 sec   Magnesium    Collection Time: 08/08/23  5:22 AM   Result Value Ref Range    Magnesium 1.9 1.6 - 2.6 mg/dL   Phosphorus    Collection Time: 08/08/23  5:22 AM   Result Value Ref Range    Phosphorus 5.7 (H) 2.7 - 4.5 mg/dL   CBC with Automated Differential    Collection Time: 08/08/23  5:22 AM   Result Value Ref Range    WBC 13.16 (H) 3.90 - 12.70 K/uL    RBC 3.56 (L) 4.60 - 6.20 M/uL    Hemoglobin 10.8 (L) 14.0 - 18.0 g/dL    Hematocrit 31.6 (L) 40.0 - 54.0 %    MCV 89 82 - 98 fL    MCH 30.3 27.0 - 31.0 pg    MCHC 34.2 32.0 - 36.0 g/dL    RDW 14.1 11.5 - 14.5 %    Platelets 169 150 - 450 K/uL    MPV 10.4 9.2 - 12.9 fL    Immature Granulocytes 0.8 (H) 0.0 - 0.5 %    Gran # (ANC) 10.6 (H) 1.8 - 7.7 K/uL    Immature Grans (Abs) 0.11 (H) 0.00 - 0.04 K/uL    Lymph # 1.2  1.0 - 4.8 K/uL    Mono # 1.2 (H) 0.3 - 1.0 K/uL    Eos # 0.0 0.0 - 0.5 K/uL    Baso # 0.03 0.00 - 0.20 K/uL    nRBC 0 0 /100 WBC    Gran % 80.5 (H) 38.0 - 73.0 %    Lymph % 9.3 (L) 18.0 - 48.0 %    Mono % 9.0 4.0 - 15.0 %    Eosinophil % 0.2 0.0 - 8.0 %    Basophil % 0.2 0.0 - 1.9 %    Differential Method Automated    CBC auto differential    Collection Time: 08/08/23  5:22 AM   Result Value Ref Range    WBC 13.16 (H) 3.90 - 12.70 K/uL    RBC 3.56 (L) 4.60 - 6.20 M/uL    Hemoglobin 10.8 (L) 14.0 - 18.0 g/dL    Hematocrit 31.6 (L) 40.0 - 54.0 %    MCV 89 82 - 98 fL    MCH 30.3 27.0 - 31.0 pg    MCHC 34.2 32.0 - 36.0 g/dL    RDW 14.1 11.5 - 14.5 %    Platelets 169 150 - 450 K/uL    MPV 10.4 9.2 - 12.9 fL    Immature Granulocytes 0.8 (H) 0.0 - 0.5 %    Gran # (ANC) 10.6 (H) 1.8 - 7.7 K/uL    Immature Grans (Abs) 0.11 (H) 0.00 - 0.04 K/uL    Lymph # 1.2 1.0 - 4.8 K/uL    Mono # 1.2 (H) 0.3 - 1.0 K/uL    Eos # 0.0 0.0 - 0.5 K/uL    Baso # 0.03 0.00 - 0.20 K/uL    nRBC 0 0 /100 WBC    Gran % 80.5 (H) 38.0 - 73.0 %    Lymph % 9.3 (L) 18.0 - 48.0 %    Mono % 9.0 4.0 - 15.0 %    Eosinophil % 0.2 0.0 - 8.0 %    Basophil % 0.2 0.0 - 1.9 %    Differential Method Automated    Basic Metabolic Panel    Collection Time: 08/08/23  5:22 AM   Result Value Ref Range    Sodium 132 (L) 136 - 145 mmol/L    Potassium 3.4 (L) 3.5 - 5.1 mmol/L    Chloride 94 (L) 95 - 110 mmol/L    CO2 23 23 - 29 mmol/L    Glucose 108 70 - 110 mg/dL    BUN 84 (H) 8 - 23 mg/dL    Creatinine 6.9 (H) 0.5 - 1.4 mg/dL    Calcium 8.7 8.7 - 10.5 mg/dL    Anion Gap 15 8 - 16 mmol/L    eGFR 8 (A) >60 mL/min/1.73 m^2   C-Reactive Protein    Collection Time: 08/08/23  5:22 AM   Result Value Ref Range    .9 (H) 0.0 - 8.2 mg/L   Lactic Acid, Plasma    Collection Time: 08/08/23  8:07 AM   Result Value Ref Range    Lactate (Lactic Acid) 0.8 0.5 - 2.2 mmol/L   Cortisol    Collection Time: 08/08/23  8:07 AM   Result Value Ref Range    Cortisol 16.70 ug/dL     Renal  US    FINDINGS:  The right kidney measures approximately 11.1 cm in length.  There is no evidence for hydronephrosis.  Color imaging demonstrates flow to the right kidney with a resistive index measurement of 0.60.  At the lower pole of the right kidney there is a 4.1 mm hyperechoic focus that may relate to a small nonobstructing calculus.  At the upper pole of the right kidney there is a hypoechoic finding measuring approximately 11 x 6.9 by 13.4 mm with posterior wall enhancement and increased through transmission, most consistent with a small cyst.     The left kidney measures approximately 13 cm in length.  There is no evidence for hydronephrosis.  Color imaging demonstrates flow to the left kidney with a resistive index measurement of 0.72.  At the mid to upper pole of the left kidney there is an exophytic hypoechoic finding measuring approximately 3.2 x 2.6 x 2.7 cm in size, consistent with a cyst.     The urinary bladder is identified measuring approximately 5 x 7.3 x 5.4 cm with a calculated volume of 102.42 mL.  Postvoid imaging is not available.     The prostate measures approximately 3.2 x 3 x 4.4 cm in size with a calculated volume of 22 cc.     Impression:     There is no evidence for hydronephrosis bilaterally.     Mild elevated resistive index of the left kidney measuring 0.72 can be seen with medical renal disease.     Nonobstructing calculus of the right kidney.     Renal cysts are noted.  I/O last 3 completed shifts:  In: 1000 [IV Piggyback:1000]  Out: 1150 [Urine:1150]    Vitals:    08/08/23 1040 08/08/23 1100 08/08/23 1120 08/08/23 1200   BP: (!) 100/55 (!) 108/57 (!) 107/59 109/66   Pulse: 100 102 102 105   Resp: 19 17 19 16   Temp:       TempSrc:       SpO2: (!) 93% (!) 93% (!) 94% (!) 94%   Weight:       Height:           No Jvd, Thyromegaly or Lymphadenopathy  Lungs: Fairly clear anteriorly and laterally  Cor: RRR no G or rubs  Abd: Soft benign good bowel sounds non tender  Ext: No E C  C    A)    LAM cause likely related to decreased PO intake, meds, maybe infection (has elevated wbc) aleve FENA >1  Fair uo creat better   Hx of hrn   Hyper uricemia   Hx of drug use   Renal US suggests CKD  Asx K stone  Active Smoker   Active etoh user   High po4  Consider rhabdo/gout/hepatitis     P)    Renal Diet  Home meds  Protect access  HD not needed right now  Binders prn  Check pthi uric acid cpk  Adjust all meds to the degree of renal fx  Close follow up I/O and weights  Maintain Hydration   No need for K bx  Add allopurinol

## 2023-08-08 NOTE — ANESTHESIA PREPROCEDURE EVALUATION
08/08/2023    Pre-operative evaluation for Procedure(s) (LRB):  Transesophageal echo (KASHIF) intra-procedure log documentation (N/A)  ECHOCARDIOGRAM, TRANSESOPHAGEAL (N/A)  Cardioversion (N/A)    Vivek Whitman is a 62 y.o. male     Patient Active Problem List   Diagnosis    Heroin abuse    Accidental drug overdose    Dizziness    Hypertensive urgency    Tobacco abuse    Alcohol abuse    History of stroke    Dehydration    ARF (acute renal failure)    Hypotension    Leukocytosis    Class 1 obesity with body mass index (BMI) of 31.0 to 31.9 in adult    A-fib    Sepsis       Review of patient's allergies indicates:  No Known Allergies    No current facility-administered medications on file prior to encounter.     Current Outpatient Medications on File Prior to Encounter   Medication Sig Dispense Refill    amlodipine-olmesartan (CHERI) 5-40 mg per tablet Take 1 tablet by mouth once daily.      aspirin 81 MG Chew Take 1 tablet (81 mg total) by mouth once daily. 30 tablet 1    carvediloL (COREG) 25 MG tablet Take 25 mg by mouth 2 (two) times daily.      chlorthalidone (HYGROTEN) 25 MG Tab Take 25 mg by mouth once daily.      rosuvastatin (CRESTOR) 20 MG tablet Take 20 mg by mouth once daily.      naloxone (NARCAN) 1 mg/mL injection 2 mg (1 mg per nostril) by Nasal route as needed for opioid overdose; may repeat in 3 to 5 minutes if not effective. Call 911 (Patient not taking: Reported on 8/7/2023) 2 mL 1    nicotine (NICODERM CQ) 7 mg/24 hr Place 1 patch onto the skin once daily. 28 patch 1       Past Surgical History:   Procedure Laterality Date    HERNIA REPAIR         Social History     Socioeconomic History    Marital status: Single   Tobacco Use    Smoking status: Every Day     Current packs/day: 1.00     Types: Cigarettes   Substance and Sexual Activity    Alcohol use: Yes     Alcohol/week: 12.0 standard drinks of alcohol     Types: 12 Cans of beer per week    Drug use: No       LABS  CBC:    Recent Labs     08/07/23 1936 08/08/23  0522   WBC 15.37* 13.16*  13.16*   RBC 4.29* 3.56*  3.56*   HGB 13.0* 10.8*  10.8*   HCT 37.3* 31.6*  31.6*    169  169   MCV 87 89  89   MCH 30.3 30.3  30.3   MCHC 34.9 34.2  34.2       CMP:   Recent Labs     08/07/23  0716 08/07/23  1202 08/08/23  0522   * 133*  133* 132*   K 3.6 3.5  3.5 3.4*   CL 95 94*  94* 94*   CO2 23 26 26 23   BUN 85* 87*  87* 84*   CREATININE 7.5* 7.5*  7.5* 6.9*   * 132*  132* 108   MG 2.1  --  1.9   PHOS 5.9*  --  5.7*   CALCIUM 8.6* 8.6*  8.6* 8.7   ALBUMIN  --  2.2*  2.2*  --    PROT  --  6.3  6.3  --    ALKPHOS  --  87  87  --    ALT  --  73*  73*  --    AST  --  66*  66*  --    BILITOT  --  0.8  0.8  --        INR  Recent Labs     08/07/23 1936 08/08/23  0156   INR 1.3*  --    APTT 28.9 43.3*         Pre-op Assessment    I have reviewed the Patient Summary Reports.     I have reviewed the Nursing Notes.       Review of Systems  Social:  Smoker, Alcohol Use H/o herioin use   Hematology/Oncology:        Hematology Comments: On Heparin drip   Cardiovascular:   Hypertension Dysrhythmias atrial fibrillation Afib with RVR; on amiodarone drip    8/6/2023 Echo:  Left Ventricle: The left ventricle is normal in size. Mildly increased wall thickness. Normal wall motion. There is normal systolic function with a visually estimated ejection fraction of 65 - 70%. Grade I diastolic dysfunction.   Left Atrium: Left atrium is severely dilated.    Right Ventricle: Normal right ventricular cavity size. Systolic function is normal.    Right Atrium: Right atrium is moderately dilated.    Aortic Valve: There is aortic valve sclerosis.    Tricuspid Valve: There is mild regurgitation.    Pulmonary Artery: The estimated pulmonary artery systolic pressure is 23 mmHg.    IVC/SVC: Normal venous pressure at 3 mmHg.   Pulmonary:  Pulmonary Normal    Renal/:   Chronic Renal Disease, ARF    Hepatic/GI:  Hepatic/GI Normal     Neurological:  Neurology Normal    Endocrine:  Endocrine Normal        Physical Exam  General: Well nourished, Cooperative, Alert and Oriented          Anesthesia Plan  Type of Anesthesia, risks & benefits discussed:    Anesthesia Type: Gen Natural Airway, MAC  Intra-op Monitoring Plan: Standard ASA Monitors  Induction:  IV  Informed Consent: Informed consent signed with the Patient and all parties understand the risks and agree with anesthesia plan.  All questions answered.   ASA Score: 3    Ready For Surgery From Anesthesia Perspective.     .

## 2023-08-08 NOTE — ANESTHESIA POSTPROCEDURE EVALUATION
Anesthesia Post Evaluation    Patient: Vivek Whitman    Procedure(s) Performed: Procedure(s) (LRB):  Transesophageal echo (KASHIF) intra-procedure log documentation (N/A)  ECHOCARDIOGRAM, TRANSESOPHAGEAL (N/A)  Cardioversion (N/A)    Final Anesthesia Type: general      Patient location during evaluation: PACU  Patient participation: Yes- Able to Participate  Level of consciousness: awake and alert and oriented  Post-procedure vital signs: reviewed and stable  Pain management: adequate  Airway patency: patent    PONV status at discharge: No PONV  Anesthetic complications: no      Cardiovascular status: blood pressure returned to baseline, hemodynamically stable and stable  Respiratory status: unassisted, spontaneous ventilation and room air  Hydration status: euvolemic  Follow-up not needed.          Vitals Value Taken Time   /66 08/08/23 1402   Temp  08/08/23 1413   Pulse 55 08/08/23 1413   Resp 21 08/08/23 1413   SpO2 94 % 08/08/23 1413   Vitals shown include unvalidated device data.      No case tracking events are documented in the log.      Pain/Bonifacio Score: No data recorded

## 2023-08-08 NOTE — ASSESSMENT & PLAN NOTE
Likely secondry to dehydration. Continue to monitor response to iv hydration. Hold anti hypertensives. Avoid nephrotoxic agents   Received report from day shift RN. Assumed patient care  AOx4  Pain rated at 5/10  Room air  No complaints of nausea at this time, tolerating GI soft diet  x2 assist OOB, high fall risk, bed alarm on  BMS in place, leaking around site, barrier cream applied and flushed per order  +void  SCDs on  Call light within reach  Bed locked and in low position   POC discussed with patient  All needs met at this time

## 2023-08-08 NOTE — HPI
Cardiology consult has been obtained because patient went into afib with rvr.  Patient came in with acute kidney injury.  Creatinine of 7.4 and elevated phosphorus.  Past medical history of hypertension.  Follows with Dr. Strong at [facility]. Denies cp, orthpnea, pnd. Denies ever having had afib in the past.       HPI:  62 y.o. male, with a PMHx of HTN, HLD, tobacco abuse, and hx of heroin abuse/alcohol abuse transferred from [facility] ED for further evaluation. He presented to the ED with cocerns of low blood pressure. Stated for the past week, he had low blood pressure readings with SBP 90-100s and DBP 50s. Noted hypotension associated with feeling fatigue, weak, and make him just want to sleep all day. Stated he has not eat or drink much. However, continued to take his home BP medications. Tried to hydrate himself with gatorade prior to coming to the ED. Denies any fever, chest pain, cough, shortness of breath, abdominal pain, nausea or vomiting. Admits he still smokes 1ppd, smoke for the past 40+ years.      In the ED, pt hypotensive with BP 94/53. Patient given IVF. Labs significant for leukocytosis, elevated Cr of 7.4, and elevated phosphorus. US renal with no evidence for hydronephrosis bilaterally.  CXR with no focal consolidations or edema. Patient admitted to  for further evaluation.       Complete pulmonary function tests, including spirometry (with   bronchodilator, as needed), lung volumes, and diffusion capacity.      Spirometry reveals a severe obstructive ventilatory defect (FEV1 30-49%   predicted). There is a significant response to bronchodilators.    Lung volumes reveal gas-trapping.    Diffusion capacity is severely reduced (<40% predicted).        Cath 2022:    Findings:   Coronary circulation findings:   Right dominant coronary system  LM: Normal  LAD:30% mid  DIAG 1:Normal  DIAG 2:Normal  LCx: Normal  OM1: Normal  OM2: Normal  RCA: 70% proximal densely calcified, 50%  distal  RPDA:Normal  RPLV:Normal    EDP 12 mmHg    FT 2.2 min  Sedation time 20 min  contrast 38 mL      RECOMMENDATIONS:     · Lifetime ASA.  · Aggressive cardiovascular risk factor modification.  · Smoking cessation.  · Follow-up at the [facility].  · Per protocol post-cardiac catheterization care and access site   management.   · Staged PCI of the RCA will be considered from femoral access given   degree of heavy calcification, but would ensure dyspnea is not primary   pulmonary first   Complication  There were no immediate complications during the procedure.

## 2023-08-08 NOTE — PLAN OF CARE
Patient presents to [facility] ED with generalized weakness and low BP. Transferred to [facility] ED for management. Patient originally had transfer orders to floor but was canceled because his heart rhythm converted to a.fib. Started on amio and heparin gtt and transferred to ICU for management.

## 2023-08-08 NOTE — CONSULTS
[facility] Emergency Dept  Cardiology  Consult Note    Patient Name: Vivek Whitman  MRN: [id number]  Admission Date: 8/6/2023  Hospital Length of Stay: 0 days  Code Status: Full Code   Attending Provider: Shaina Heath DO   Consulting Provider: Dank Claudio MD  Primary Care Physician: Racheal, Primary Doctor  Principal Problem:LAM (acute kidney injury)    Patient information was obtained from patient and ER records.     Inpatient consult to Cardiology  Consult performed by: Dank Claudio MD  Consult ordered by: Slava Emerson MD        Subjective:     Chief Complaint:  afib     HPI:   Cardiology consult has been obtained because patient went into afib with rvr.  Patient came in with acute kidney injury.  Creatinine of 7.4 and elevated phosphorus.  Past medical history of hypertension.  Follows with Dr. Strong at [facility]. Denies cp, orthpnea, pnd. Denies ever having had afib in the past.       HPI:  62 y.o. male, with a PMHx of HTN, HLD, tobacco abuse, and hx of heroin abuse/alcohol abuse transferred from [facility] ED for further evaluation. He presented to the ED with cocerns of low blood pressure. Stated for the past week, he had low blood pressure readings with SBP 90-100s and DBP 50s. Noted hypotension associated with feeling fatigue, weak, and make him just want to sleep all day. Stated he has not eat or drink much. However, continued to take his home BP medications. Tried to hydrate himself with gatorade prior to coming to the ED. Denies any fever, chest pain, cough, shortness of breath, abdominal pain, nausea or vomiting. Admits he still smokes 1ppd, smoke for the past 40+ years.      In the ED, pt hypotensive with BP 94/53. Patient given IVF. Labs significant for leukocytosis, elevated Cr of 7.4, and elevated phosphorus. US renal with no evidence for hydronephrosis bilaterally.  CXR with no focal consolidations or edema. Patient admitted to  for further evaluation.       Complete pulmonary function tests,  including spirometry (with   bronchodilator, as needed), lung volumes, and diffusion capacity.      Spirometry reveals a severe obstructive ventilatory defect (FEV1 30-49%   predicted). There is a significant response to bronchodilators.    Lung volumes reveal gas-trapping.    Diffusion capacity is severely reduced (<40% predicted).        Cath 2022:    Findings:   Coronary circulation findings:   Right dominant coronary system  LM: Normal  LAD:30% mid  DIAG 1:Normal  DIAG 2:Normal  LCx: Normal  OM1: Normal  OM2: Normal  RCA: 70% proximal densely calcified, 50% distal  RPDA:Normal  RPLV:Normal    EDP 12 mmHg    FT 2.2 min  Sedation time 20 min  contrast 38 mL      RECOMMENDATIONS:     · Lifetime ASA.  · Aggressive cardiovascular risk factor modification.  · Smoking cessation.  · Follow-up at the [facility].  · Per protocol post-cardiac catheterization care and access site   management.   · Staged PCI of the RCA will be considered from femoral access given   degree of heavy calcification, but would ensure dyspnea is not primary   pulmonary first   Complication  There were no immediate complications during the procedure.              Past Medical History:   Diagnosis Date    Hypertension        Past Surgical History:   Procedure Laterality Date    HERNIA REPAIR         Review of patient's allergies indicates:  No Known Allergies    No current facility-administered medications on file prior to encounter.     Current Outpatient Medications on File Prior to Encounter   Medication Sig    amlodipine-olmesartan (CHERI) 5-40 mg per tablet Take 1 tablet by mouth once daily.    aspirin 81 MG Chew Take 1 tablet (81 mg total) by mouth once daily.    carvediloL (COREG) 25 MG tablet Take 25 mg by mouth 2 (two) times daily.    chlorthalidone (HYGROTEN) 25 MG Tab Take 25 mg by mouth once daily.    rosuvastatin (CRESTOR) 20 MG tablet Take 20 mg by mouth once daily.    naloxone (NARCAN) 1 mg/mL injection 2 mg  (1 mg per nostril) by Nasal route as needed for opioid overdose; may repeat in 3 to 5 minutes if not effective. Call 911 (Patient not taking: Reported on 8/7/2023)    nicotine (NICODERM CQ) 7 mg/24 hr Place 1 patch onto the skin once daily.    [DISCONTINUED] famotidine (PEPCID) 20 MG tablet Take 1 tablet (20 mg total) by mouth 2 (two) times daily.    [DISCONTINUED] hydroCHLOROthiazide (HYDRODIURIL) 25 MG tablet Take 1 tablet (25 mg total) by mouth once daily.    [DISCONTINUED] lisinopriL (PRINIVIL,ZESTRIL) 40 MG tablet Take 1 tablet (40 mg total) by mouth once daily.    [DISCONTINUED] meclizine (ANTIVERT) 25 mg tablet Take 1 tablet (25 mg total) by mouth 3 (three) times daily as needed for Dizziness.     Family History    None       Tobacco Use    Smoking status: Every Day     Current packs/day: 1.00     Types: Cigarettes    Smokeless tobacco: Not on file   Substance and Sexual Activity    Alcohol use: Yes     Alcohol/week: 12.0 standard drinks of alcohol     Types: 12 Cans of beer per week    Drug use: No    Sexual activity: Not on file     Review of Systems   Constitutional: Positive for malaise/fatigue.   HENT: Negative.     Eyes: Negative.    Cardiovascular:  Positive for dyspnea on exertion and palpitations.   Respiratory:  Positive for shortness of breath.    Endocrine: Negative.    Hematologic/Lymphatic: Negative.    Skin: Negative.    Musculoskeletal: Negative.    Gastrointestinal: Negative.    Genitourinary: Negative.    Neurological: Negative.    Psychiatric/Behavioral: Negative.     Allergic/Immunologic: Negative.      Objective:     Vital Signs (Most Recent):  Temp: 98 °F (36.7 °C) (08/07/23 0847)  Pulse: 105 (08/07/23 2033)  Resp: (!) 24 (08/07/23 2011)  BP: 117/66 (08/07/23 2033)  SpO2: 96 % (08/07/23 2033) Vital Signs (24h Range):  Temp:  [98 °F (36.7 °C)-98.7 °F (37.1 °C)] 98 °F (36.7 °C)  Pulse:  [] 105  Resp:  [16-26] 24  SpO2:  [9 %-100 %] 96 %  BP: ()/(41-74) 117/66      Weight: 93 kg (205 lb)  Body mass index is 31.17 kg/m².    SpO2: 96 %         Intake/Output Summary (Last 24 hours) at 8/7/2023 2141  Last data filed at 8/7/2023 0722  Gross per 24 hour   Intake 1000 ml   Output 300 ml   Net 700 ml       Lines/Drains/Airways       Peripheral Intravenous Line  Duration                  Peripheral IV - Single Lumen 08/06/23 2150 18 G Right Antecubital <1 day                     Physical Exam  Vitals reviewed.   Constitutional:       Appearance: He is well-developed.   HENT:      Head: Normocephalic.   Eyes:      Conjunctiva/sclera: Conjunctivae normal.      Pupils: Pupils are equal, round, and reactive to light.   Cardiovascular:      Rate and Rhythm: Normal rate and regular rhythm.      Heart sounds: Normal heart sounds.   Pulmonary:      Effort: Pulmonary effort is normal.      Breath sounds: Normal breath sounds.   Abdominal:      General: Bowel sounds are normal.      Palpations: Abdomen is soft.   Musculoskeletal:      Cervical back: Normal range of motion and neck supple.   Skin:     General: Skin is warm.   Neurological:      Mental Status: He is alert and oriented to person, place, and time.          Significant Labs: BMP:   Recent Labs   Lab 08/07/23  0716 08/07/23  1202   * 132*  132*   * 133*  133*   K 3.6 3.5  3.5   CL 95 94*  94*   CO2 23 26  26   BUN 85* 87*  87*   CREATININE 7.5* 7.5*  7.5*   CALCIUM 8.6* 8.6*  8.6*   MG 2.1  --    , CMP   Recent Labs   Lab 08/07/23  0716 08/07/23  1202   * 133*  133*   K 3.6 3.5  3.5   CL 95 94*  94*   CO2 23 26  26   * 132*  132*   BUN 85* 87*  87*   CREATININE 7.5* 7.5*  7.5*   CALCIUM 8.6* 8.6*  8.6*   PROT  --  6.3  6.3   ALBUMIN  --  2.2*  2.2*   BILITOT  --  0.8  0.8   ALKPHOS  --  87  87   AST  --  66*  66*   ALT  --  73*  73*   ANIONGAP 14 13  13   , CBC   Recent Labs   Lab 08/07/23  0716 08/07/23  1936   WBC 16.07* 15.37*   HGB 12.9* 13.0*   HCT 37.6* 37.3*    186   ,  "INR   Recent Labs   Lab 08/07/23  1936   INR 1.3*   , Lipid Panel No results for input(s): "CHOL", "HDL", "LDLCALC", "TRIG", "CHOLHDL" in the last 48 hours., Troponin No results for input(s): "TROPONINI" in the last 48 hours., and All pertinent lab results from the last 24 hours have been reviewed.    Significant Imaging: Echocardiogram: Transthoracic echo (TTE) complete (Cupid Only):   Results for orders placed or performed during the hospital encounter of 08/06/23   Echo   Result Value Ref Range    LVOT stroke volume 93.88 cm3    LVIDd 4.57 3.5 - 6.0 cm    LV Systolic Volume 33.45 mL    LVIDs 2.95 2.1 - 4.0 cm    LV Diastolic Volume 95.78 mL    IVS 1.29 (A) 0.6 - 1.1 cm    LVOT diameter 2.01 cm    LVOT area 3.2 cm2    FS 35 28 - 44 %    Left Ventricle Relative Wall Thickness 0.56 cm    Posterior Wall 1.28 (A) 0.6 - 1.1 cm    LV mass 224.03 g    MV Peak E Gonzalez 0.79 m/s    TDI LATERAL 0.07 m/s    TDI SEPTAL 0.05 m/s    E/E' ratio 13.17 m/s    MV Peak A Gonzalez 0.79 m/s    TR Max Gonzalez 2.24 m/s    E/A ratio 1.00     IVRT 140.82 msec    E wave deceleration time 207.32 msec    LV SEPTAL E/E' RATIO 15.80 m/s    LV LATERAL E/E' RATIO 11.29 m/s    LVOT peak gonzalez 1.31 m/s    Left Ventricular Outflow Tract Mean Velocity 0.82 cm/s    Left Ventricular Outflow Tract Mean Gradient 3.37 mmHg    LA size 4.11 cm    Left Atrium Major Axis 5.80 cm    Left Atrium Minor Axis 5.83 cm    RV S' 16.17 cm/s    TAPSE 2.14 cm    RA Major Axis 6.64 cm    AV mean gradient 9 mmHg    AV peak gradient 17 mmHg    Ao peak gonzalez 2.06 m/s    Ao VTI 37.70 cm    LVOT peak VTI 29.60 cm    AV valve area 2.49 cm²    AV Velocity Ratio 0.64     AV index (prosthetic) 0.79     DELMER by Velocity Ratio 2.02 cm²    MV mean gradient 1 mmHg    MV peak gradient 4 mmHg    MV stenosis pressure 1/2 time 60.12 ms    MV valve area p 1/2 method 3.66 cm2    MV valve area by continuity eq 3.48 cm2    MV VTI 27.0 cm    Triscuspid Valve Regurgitation Peak Gradient 20 mmHg    PV PEAK " VELOCITY 1.07 m/s    PV peak gradient 5 mmHg    Sinus 3.55 cm    STJ 1.93 cm    Ascending aorta 2.73 cm    IVC diameter 1.74 cm    Mean e' 0.06 m/s    RVDD 4.55 cm    LA volume 97.51 cm3    LA WIDTH 4.8 cm    RA Width 4.2 cm    TV resting pulmonary artery pressure 23 mmHg    RV TB RVSP 5 mmHg    Est. RA pres 3 mmHg    Narrative      Left Ventricle: The left ventricle is normal in size. Mildly increased   wall thickness. Normal wall motion. There is normal systolic function with   a visually estimated ejection fraction of 65 - 70%. Grade I diastolic   dysfunction.    Left Atrium: Left atrium is severely dilated.    Right Ventricle: Normal right ventricular cavity size. Systolic   function is normal.    Right Atrium: Right atrium is moderately dilated.    Aortic Valve: There is aortic valve sclerosis.    Tricuspid Valve: There is mild regurgitation.    Pulmonary Artery: The estimated pulmonary artery systolic pressure is   23 mmHg.    IVC/SVC: Normal venous pressure at 3 mmHg.       Assessment and Plan:     * LAM (acute kidney injury)  Iv hydration    A-fib  afib with rvr. Likely ppted by lam.  Heparin drip. amio drip for rate control.   Silver cv in am if continues to be in afib  Not a candidate for long term amio because of severe pulm disease.    Hypotension  Likely secondry to dehydration. Continue to monitor response to iv hydration. Hold anti hypertensives. Avoid nephrotoxic agents    Dehydration  Iv hydration    Tobacco abuse        Heroin abuse  States is now clean        VTE Risk Mitigation (From admission, onward)         Ordered     heparin 25,000 units in dextrose 5% (100 units/ml) IV bolus from bag - ADDITIONAL PRN BOLUS - 60 units/kg  As needed (PRN)        Question:  Heparin Infusion Adjustment (DO NOT MODIFY ANSWER)  Answer:  [email or web address]\epic\Images\Pharmacy\HeparinInfusions\heparin LOW INTENSITY nomogram for [facility] [id number]    08/07/23 1906     heparin 25,000 units in dextrose 5% (100 units/ml)  IV bolus from bag - ADDITIONAL PRN BOLUS - 30 units/kg  As needed (PRN)        Question:  Heparin Infusion Adjustment (DO NOT MODIFY ANSWER)  Answer:  [email or web address]\epic\Images\Pharmacy\HeparinInfusions\heparin LOW INTENSITY nomogram for [facility] [id number].pdf    08/07/23 1906     heparin 25,000 units in dextrose 5% 250 mL (100 units/mL) infusion LOW INTENSITY nomogram - [facility]  Continuous        Question Answer Comment   Heparin Infusion Adjustment (DO NOT MODIFY ANSWER) [email or web address]\epic\Images\Pharmacy\HeparinInfusions\heparin LOW INTENSITY nomogram for [facility] [id number]    Begin at (in units/kg/hr) 12        08/07/23 1906     IP VTE LOW RISK PATIENT  Once         08/07/23 0211     Place sequential compression device  Until discontinued         08/07/23 0211                Thank you for your consult. I will follow-up with patient. Please contact us if you have any additional questions.    Dank Claudio MD  Cardiology   [facility] [place] - Emergency Dept      Critical Care Time:  50 minutes     Critical care was time spent personally by me on the following activities: development of treatment plan with patient or surrogate and bedside caregivers, discussions with consultants, evaluation of patient's response to treatment, examination of patient, ordering and performing treatments and interventions, ordering and review of laboratory studies, ordering and review of radiographic studies, pulse oximetry, re-evaluation of patient's condition. This critical care time did not overlap with that of any other provider or involve time for any procedures.

## 2023-08-08 NOTE — ASSESSMENT & PLAN NOTE
afib with rvr. Likely ppted by lolis.  Heparin drip. amio drip for rate control.   Silver cv in am if continues to be in afib  Not a candidate for long term amio because of severe pulm disease.

## 2023-08-08 NOTE — PROGRESS NOTES
[facility]  Progress Note    Patient Name: Vivek Whitman  MRN: [id number]  Patient Class: IP- Inpatient   Admission Date: 8/6/2023  Length of Stay: 1 days  Attending Physician: Glynn Blunt MD  Primary Care Provider: Racheal, Primary Doctor        Subjective:     Principal Problem:LAM (acute kidney injury)        HPI:   62 y.o. male, with a PMHx of HTN, HLD, tobacco abuse, and hx of heroin abuse/alcohol abuse transferred from [facility] ED for further evaluation. He presented to the ED with cocerns of low blood pressure. Stated for the past week, he had low blood pressure readings with SBP 90-100s and DBP 50s. Noted hypotension associated with feeling fatigue, weak, and make him just want to sleep all day. Stated he has not eat or drink much. However, continued to take his home BP medications. Tried to hydrate himself with gatorade prior to coming to the ED. Denies any fever, chest pain, cough, shortness of breath, abdominal pain, nausea or vomiting. Admits he still smokes 1ppd, smoke for the past 40+ years.     In the ED, pt hypotensive with BP 94/53. Patient given IVF. Labs significant for leukocytosis, elevated Cr of 7.4, and elevated phosphorus. US renal with no evidence for hydronephrosis bilaterally.  CXR with no focal consolidations or edema. Patient admitted to  for further evaluation.       Overview/Hospital Course:  Patient admitted with hypotension and leukocytosis, and feeling unwell for the past couple weeks.  Patient's blood pressure again low today at 70/49, concerns he could bacteremia or another infection.  Initial presentation of AFib (and ARF) often in the setting of infection due to catecholamine release and hypotension, as well.  He is afebrile, however this does not exclude infection. Hx of IVDU makes him higher risk. Blood cultures taken.  , also concerning for an ongoing inflammatory issue/infection.  Given acute renal failure, will hold off on vancomycin for  now.  Will initiate IV cefepime and oral doxycycline.  Will await cultures to tailor antibiotics or deescalate.  8 a.m. cortisol was WNL.  Creatinine slowly improving with fluids.      Interval History:  NAEON.  No new issues.   Denies complaints.  All questions answered and updates on care given.       ROS:  General: Negative for fevers or chills.  Cardiac: Negative for chest pain or orthopnea   Pulmonary: Negative for dyspnea or wheezing.  GI: Negative for abdominal distention or pain     Vitals:    08/08/23 1040 08/08/23 1100 08/08/23 1120 08/08/23 1200   BP: (!) 100/55 (!) 108/57 (!) 107/59 109/66   BP Location:       Patient Position:       Pulse: 100 102 102 105   Resp: 19 17 19 16   Temp:       TempSrc:       SpO2: (!) 93% (!) 93% (!) 94% (!) 94%   Weight:       Height:              Body mass index is 31.17 kg/m².      PHYSICAL EXAM:  GENERAL APPEARANCE: alert and cooperative, and appears to be in no acute distress.  HEENT:     HEAD: NC/AT     EYES: PERRL, EOMI.  Vision is grossly intact.  NECK: Neck supple, non-tender without LAD, masses or thyromegaly.  CARDIAC: There is no peripheral edema, cyanosis or pallor.   LUNGS: Clear to auscultation and percussion without rales or wheezing  ABDOMEN: Non-distended. No guarding.  MSK: No joint erythema or tenderness.   EXTREMITIES: No significant deformity or joint abnormality. No edema.   NEUROLOGICAL: CN II-XII grossly intact.   SKIN: Skin normal color, texture and turgor with no lesions or eruptions.  PSYCHIATRIC: Oriented. No tangential speech. No Hyperactive features.        Recent Results (from the past 24 hour(s))   APTT    Collection Time: 08/07/23  7:36 PM   Result Value Ref Range    aPTT 28.9 21.0 - 32.0 sec   Protime-INR    Collection Time: 08/07/23  7:36 PM   Result Value Ref Range    Prothrombin Time 13.5 (H) 9.0 - 12.5 sec    INR 1.3 (H) 0.8 - 1.2   CBC auto differential    Collection Time: 08/07/23  7:36 PM   Result Value Ref Range    WBC 15.37 (H)  3.90 - 12.70 K/uL    RBC 4.29 (L) 4.60 - 6.20 M/uL    Hemoglobin 13.0 (L) 14.0 - 18.0 g/dL    Hematocrit 37.3 (L) 40.0 - 54.0 %    MCV 87 82 - 98 fL    MCH 30.3 27.0 - 31.0 pg    MCHC 34.9 32.0 - 36.0 g/dL    RDW 14.0 11.5 - 14.5 %    Platelets 186 150 - 450 K/uL    MPV 9.9 9.2 - 12.9 fL    Immature Granulocytes 0.9 (H) 0.0 - 0.5 %    Gran # (ANC) 12.7 (H) 1.8 - 7.7 K/uL    Immature Grans (Abs) 0.14 (H) 0.00 - 0.04 K/uL    Lymph # 1.2 1.0 - 4.8 K/uL    Mono # 1.3 (H) 0.3 - 1.0 K/uL    Eos # 0.0 0.0 - 0.5 K/uL    Baso # 0.03 0.00 - 0.20 K/uL    nRBC 0 0 /100 WBC    Gran % 82.4 (H) 38.0 - 73.0 %    Lymph % 7.9 (L) 18.0 - 48.0 %    Mono % 8.5 4.0 - 15.0 %    Eosinophil % 0.1 0.0 - 8.0 %    Basophil % 0.2 0.0 - 1.9 %    Differential Method Automated    APTT    Collection Time: 08/08/23  1:56 AM   Result Value Ref Range    aPTT 43.3 (H) 21.0 - 32.0 sec   Magnesium    Collection Time: 08/08/23  5:22 AM   Result Value Ref Range    Magnesium 1.9 1.6 - 2.6 mg/dL   Phosphorus    Collection Time: 08/08/23  5:22 AM   Result Value Ref Range    Phosphorus 5.7 (H) 2.7 - 4.5 mg/dL   CBC with Automated Differential    Collection Time: 08/08/23  5:22 AM   Result Value Ref Range    WBC 13.16 (H) 3.90 - 12.70 K/uL    RBC 3.56 (L) 4.60 - 6.20 M/uL    Hemoglobin 10.8 (L) 14.0 - 18.0 g/dL    Hematocrit 31.6 (L) 40.0 - 54.0 %    MCV 89 82 - 98 fL    MCH 30.3 27.0 - 31.0 pg    MCHC 34.2 32.0 - 36.0 g/dL    RDW 14.1 11.5 - 14.5 %    Platelets 169 150 - 450 K/uL    MPV 10.4 9.2 - 12.9 fL    Immature Granulocytes 0.8 (H) 0.0 - 0.5 %    Gran # (ANC) 10.6 (H) 1.8 - 7.7 K/uL    Immature Grans (Abs) 0.11 (H) 0.00 - 0.04 K/uL    Lymph # 1.2 1.0 - 4.8 K/uL    Mono # 1.2 (H) 0.3 - 1.0 K/uL    Eos # 0.0 0.0 - 0.5 K/uL    Baso # 0.03 0.00 - 0.20 K/uL    nRBC 0 0 /100 WBC    Gran % 80.5 (H) 38.0 - 73.0 %    Lymph % 9.3 (L) 18.0 - 48.0 %    Mono % 9.0 4.0 - 15.0 %    Eosinophil % 0.2 0.0 - 8.0 %    Basophil % 0.2 0.0 - 1.9 %    Differential Method  Automated    CBC auto differential    Collection Time: 08/08/23  5:22 AM   Result Value Ref Range    WBC 13.16 (H) 3.90 - 12.70 K/uL    RBC 3.56 (L) 4.60 - 6.20 M/uL    Hemoglobin 10.8 (L) 14.0 - 18.0 g/dL    Hematocrit 31.6 (L) 40.0 - 54.0 %    MCV 89 82 - 98 fL    MCH 30.3 27.0 - 31.0 pg    MCHC 34.2 32.0 - 36.0 g/dL    RDW 14.1 11.5 - 14.5 %    Platelets 169 150 - 450 K/uL    MPV 10.4 9.2 - 12.9 fL    Immature Granulocytes 0.8 (H) 0.0 - 0.5 %    Gran # (ANC) 10.6 (H) 1.8 - 7.7 K/uL    Immature Grans (Abs) 0.11 (H) 0.00 - 0.04 K/uL    Lymph # 1.2 1.0 - 4.8 K/uL    Mono # 1.2 (H) 0.3 - 1.0 K/uL    Eos # 0.0 0.0 - 0.5 K/uL    Baso # 0.03 0.00 - 0.20 K/uL    nRBC 0 0 /100 WBC    Gran % 80.5 (H) 38.0 - 73.0 %    Lymph % 9.3 (L) 18.0 - 48.0 %    Mono % 9.0 4.0 - 15.0 %    Eosinophil % 0.2 0.0 - 8.0 %    Basophil % 0.2 0.0 - 1.9 %    Differential Method Automated    Basic Metabolic Panel    Collection Time: 08/08/23  5:22 AM   Result Value Ref Range    Sodium 132 (L) 136 - 145 mmol/L    Potassium 3.4 (L) 3.5 - 5.1 mmol/L    Chloride 94 (L) 95 - 110 mmol/L    CO2 23 23 - 29 mmol/L    Glucose 108 70 - 110 mg/dL    BUN 84 (H) 8 - 23 mg/dL    Creatinine 6.9 (H) 0.5 - 1.4 mg/dL    Calcium 8.7 8.7 - 10.5 mg/dL    Anion Gap 15 8 - 16 mmol/L    eGFR 8 (A) >60 mL/min/1.73 m^2   C-Reactive Protein    Collection Time: 08/08/23  5:22 AM   Result Value Ref Range    .9 (H) 0.0 - 8.2 mg/L   Lactic Acid, Plasma    Collection Time: 08/08/23  8:07 AM   Result Value Ref Range    Lactate (Lactic Acid) 0.8 0.5 - 2.2 mmol/L   Cortisol    Collection Time: 08/08/23  8:07 AM   Result Value Ref Range    Cortisol 16.70 ug/dL       Microbiology Results (last 7 days)     Procedure Component Value Units Date/Time    Blood culture [[id number]] Collected: 08/08/23 0807    Order Status: Sent Specimen: Blood from Peripheral, Right Hand Updated: 08/08/23 0837    Blood culture [[id number]] Collected: 08/08/23 0819    Order Status: Sent Specimen:  Blood Updated: 08/08/23 0836           Imaging Results          US Retroperitoneal Complete (Final result)  Result time 08/07/23 03:33:50    Final result by Marquise Humphries MD (08/07/23 03:33:50)                 Impression:      There is no evidence for hydronephrosis bilaterally.    Mild elevated resistive index of the left kidney measuring 0.72 can be seen with medical renal disease.    Nonobstructing calculus of the right kidney.    Renal cysts are noted.      Electronically signed by: Marquise Humphries  Date:    08/07/2023  Time:    03:33             Narrative:    EXAMINATION:  US RETROPERITONEAL COMPLETE    CLINICAL HISTORY:  acute renal failure;    TECHNIQUE:  Ultrasound of the kidneys and urinary bladder was performed including color flow and Doppler evaluation of the kidneys.    COMPARISON:  None.    FINDINGS:  The right kidney measures approximately 11.1 cm in length.  There is no evidence for hydronephrosis.  Color imaging demonstrates flow to the right kidney with a resistive index measurement of 0.60.  At the lower pole of the right kidney there is a 4.1 mm hyperechoic focus that may relate to a small nonobstructing calculus.  At the upper pole of the right kidney there is a hypoechoic finding measuring approximately 11 x 6.9 by 13.4 mm with posterior wall enhancement and increased through transmission, most consistent with a small cyst.    The left kidney measures approximately 13 cm in length.  There is no evidence for hydronephrosis.  Color imaging demonstrates flow to the left kidney with a resistive index measurement of 0.72.  At the mid to upper pole of the left kidney there is an exophytic hypoechoic finding measuring approximately 3.2 x 2.6 x 2.7 cm in size, consistent with a cyst.    The urinary bladder is identified measuring approximately 5 x 7.3 x 5.4 cm with a calculated volume of 102.42 mL.  Postvoid imaging is not available.    The prostate measures approximately 3.2 x 3 x 4.4 cm in size  with a calculated volume of 22 cc.                               X-Ray Chest AP Portable (Final result)  Result time 08/07/23 00:42:12    Final result by Marquise Humphries MD (08/07/23 00:42:12)                 Impression:      There is no radiographic evidence for acute intrathoracic process.      Electronically signed by: Marquise Humphries  Date:    08/07/2023  Time:    00:42             Narrative:    EXAMINATION:  XR CHEST AP PORTABLE    CLINICAL HISTORY:  Hypotension, unspecified    TECHNIQUE:  Single frontal view of the chest was performed.    COMPARISON:  Chest radiograph March 13, 2021    FINDINGS:  AP portable chest radiographic examination is submitted.  There is mild diminished depth of inspiration.  When accounting for position and technique and depth of inspiration, the appearance of the cardiomediastinal silhouette is stable.  Aortic atherosclerotic change noted.    There is no evidence for confluent infiltrate or consolidation, significant pleural effusion or pneumothorax.    The osseous structures demonstrate chronic change.                                       Assessment/Plan:      * ARF (acute renal failure)  Patient with acute kidney injury/acute renal failure likely due to pre-renal azotemia due to dehydration LAM is currently worsening. Baseline creatinine unknown - Labs reviewed- Renal function/electrolytes with CrCl cannot be calculated (Unknown ideal weight.). according to latest data. Monitor urine output and serial BMP and adjust therapy as needed. Avoid nephrotoxins and renally dose meds for GFR listed above.    -Presented with LAM  -Cr on admit 7.4  -Baseline Cr unknown, but Cr 1.1 in 08/2022  -Suspect due to decreased PO intake and home meds (amolidpine-olmarsartan and chlorthalidone)  -Renal US with no obstruction. Findings consistent with CKD  -IVF  -Nephrology consulted: check FENA, pthi, and uric acid  -Avoid nephrotoxins, renal dose all meds.   -Monitor Is/Os  -Monitor        Sepsis  This patient does not have evidence of infective focus  My overall impression is sepsis.  Source: Unknown  Antibiotics given-   Antibiotics (72h ago, onward)    Start     Stop Route Frequency Ordered    08/08/23 1015  ceFEPIme (MAXIPIME) 1 g in dextrose 5 % in water (D5W) 100 mL IVPB (MB+)         -- IV Every 12 hours (non-standard times) 08/08/23 0901    08/08/23 1015  doxycycline tablet 100 mg         -- Oral Every 12 hours 08/08/23 0901    08/08/23 0900  mupirocin 2 % ointment         08/13/23 0859 Nasl 2 times daily 08/08/23 0725        Latest lactate reviewed-  Recent Labs   Lab 08/08/23  0807   LACTATE 0.8     Organ dysfunction indicated by Acute kidney injury and New afib    Fluid challenge Actual Body weight- Patient will receive 30ml/kg actual body weight to calculate fluid bolus for treatment of septic shock.     Post- resuscitation assessment Yes Perfusion exam was performed within 6 hours of septic shock presentation after bolus shows Adequate tissue perfusion assessed by non-invasive monitoring       Will ordered but not started, responding to fluids Pressors- Levophed for MAP of 65  Source control achieved by: Abx and fluids    · Patient admitted with hypotension and leukocytosis, and feeling unwell for the past couple weeks.  Patient's blood pressure again low today at 70/49, concerns he could bacteremia or another infection.  No skin infections appreciated. Urine clean. No consolidation on CXR.  · Initial presentation of AFib (and ARF) often in the setting of infection due to catecholamine release and hypotension, as well.    · He is afebrile, however this does not exclude infection.    · Blood cultures taken.  , also concerning for an ongoing inflammatory issue/infection.    · Given acute renal failure, will hold off on vancomycin for now.    · Will initiate IV cefepime and oral doxycycline.   · Will await cultures to tailor antibiotics or deescalate.      Class 1 obesity with  body mass index (BMI) of 31.0 to 31.9 in adult  Body mass index is 31.17 kg/m². Morbid obesity complicates all aspects of disease management from diagnostic modalities to treatment. Weight loss encouraged and health benefits explained to patient.         Leukocytosis  -WBC# 16.07 on admission w/hypotension. Pt afebrile. CXR with no acute process.   -Pt with leukocytosis, suspect reactive. Low suspicion for infection at this time.   -Hypotension improved with fluids  -Continue IVF  -Echo ordered  -Will monitor off abx at this time   -repeat labs in the AM     Hypotension  -BP as low as 63/41 in the ED  -Suspect due to pt continuing home meds (amolidpine-olmarsartan and chlorthalidone) in setting of LAM and decreased PO intake/dehydration  -Hypotension improved with fluids  -Continue IVF  -Pt with leukocytosis, suspect reactive. Low suspicion for infection at this time.   -Echo ordered  -Hold home BP meds as above  -Monitor     Dehydration  -Continue IVF as above  -See plan for LAM    Tobacco abuse  - Patient was counseled regarding smoking for 3-10 minutes.  - Encourage smoking cessation daily  - NRT offered         Heroin abuse  · Negative on admission, but since IVDU hx higher risk for bacteremia      VTE Risk Mitigation (From admission, onward)         Ordered     heparin 25,000 units in dextrose 5% (100 units/ml) IV bolus from bag - ADDITIONAL PRN BOLUS - 60 units/kg  As needed (PRN)        Question:  Heparin Infusion Adjustment (DO NOT MODIFY ANSWER)  Answer:  \[email or web address]\Images\[email or web address]\HeparinInfusions\heparin LOW INTENSITY nomogram for [facility] [id number]    08/07/23 1906     heparin 25,000 units in dextrose 5% (100 units/ml) IV bolus from bag - ADDITIONAL PRN BOLUS - 30 units/kg  As needed (PRN)        Question:  Heparin Infusion Adjustment (DO NOT MODIFY ANSWER)  Answer:  \[email or web address]\epic\Images\Pharmacy\HeparinInfusions\heparin LOW INTENSITY nomogram for [facility] [id number]    08/07/23 1906     heparin  25,000 units in dextrose 5% 250 mL (100 units/mL) infusion LOW INTENSITY nomogram - [facility]  Continuous        Question Answer Comment   Heparin Infusion Adjustment (DO NOT MODIFY ANSWER) [email or web address]\Images\Pharmacy\HeparinInfusions\heparin LOW INTENSITY nomogram for [facility] [id number]    Begin at (in units/kg/hr) 12        08/07/23 1906     IP VTE LOW RISK PATIENT  Once         08/07/23 0211     Place sequential compression device  Until discontinued         08/07/23 0211                Discharge Planning   ELODIA:      Code Status: Full Code   Is the patient medically ready for discharge?:     Reason for patient still in hospital (select all that apply): Patient trending condition and Treatment  Discharge Plan A: Home with family            Critical care time spent on the evaluation and treatment of severe organ dysfunction, review of pertinent labs and imaging studies, discussions with consulting providers and discussions with patient/family: 35 minutes.      Glynn Blunt MD  Department of Hospital Medicine   [facility] [place] [facility]

## 2023-08-08 NOTE — CARE UPDATE
Called because patient has new onset A-fib with RVR.  Noted to have ongoing hypotension.  Admitted for LAM.  Started on amiodarone drip, heparin drip.  Cardiology consulted.

## 2023-08-08 NOTE — HOSPITAL COURSE
61 yo M admitted with hypotension and leukocytosis, and feeling unwell for the past couple weeks.  There was concern for possible infection so patient started on broad-spectrum IV antibiotics.  Also with acute renal failure and Nephrology consulted.  Hospital course complicated by atrial fibrillation.  Cardiology following.  He underwent DCCV with urine improvement.  Transferred to the floor.  Patient again developed atrial fibrillation with RVR.  Started on sotalol.  Replacing electrolytes.  Blood pressure improving.  PT/OT consulted.

## 2023-08-08 NOTE — SUBJECTIVE & OBJECTIVE
Past Medical History:   Diagnosis Date    Hypertension        Past Surgical History:   Procedure Laterality Date    HERNIA REPAIR         Review of patient's allergies indicates:  No Known Allergies    No current facility-administered medications on file prior to encounter.     Current Outpatient Medications on File Prior to Encounter   Medication Sig    amlodipine-olmesartan (CHERI) 5-40 mg per tablet Take 1 tablet by mouth once daily.    aspirin 81 MG Chew Take 1 tablet (81 mg total) by mouth once daily.    carvediloL (COREG) 25 MG tablet Take 25 mg by mouth 2 (two) times daily.    chlorthalidone (HYGROTEN) 25 MG Tab Take 25 mg by mouth once daily.    rosuvastatin (CRESTOR) 20 MG tablet Take 20 mg by mouth once daily.    naloxone (NARCAN) 1 mg/mL injection 2 mg (1 mg per nostril) by Nasal route as needed for opioid overdose; may repeat in 3 to 5 minutes if not effective. Call 911 (Patient not taking: Reported on 8/7/2023)    nicotine (NICODERM CQ) 7 mg/24 hr Place 1 patch onto the skin once daily.    [DISCONTINUED] famotidine (PEPCID) 20 MG tablet Take 1 tablet (20 mg total) by mouth 2 (two) times daily.    [DISCONTINUED] hydroCHLOROthiazide (HYDRODIURIL) 25 MG tablet Take 1 tablet (25 mg total) by mouth once daily.    [DISCONTINUED] lisinopriL (PRINIVIL,ZESTRIL) 40 MG tablet Take 1 tablet (40 mg total) by mouth once daily.    [DISCONTINUED] meclizine (ANTIVERT) 25 mg tablet Take 1 tablet (25 mg total) by mouth 3 (three) times daily as needed for Dizziness.     Family History    None       Tobacco Use    Smoking status: Every Day     Current packs/day: 1.00     Types: Cigarettes    Smokeless tobacco: Not on file   Substance and Sexual Activity    Alcohol use: Yes     Alcohol/week: 12.0 standard drinks of alcohol     Types: 12 Cans of beer per week    Drug use: No    Sexual activity: Not on file     Review of Systems   Constitutional: Positive for malaise/fatigue.   HENT: Negative.     Eyes: Negative.     Cardiovascular:  Positive for dyspnea on exertion and palpitations.   Respiratory:  Positive for shortness of breath.    Endocrine: Negative.    Hematologic/Lymphatic: Negative.    Skin: Negative.    Musculoskeletal: Negative.    Gastrointestinal: Negative.    Genitourinary: Negative.    Neurological: Negative.    Psychiatric/Behavioral: Negative.     Allergic/Immunologic: Negative.      Objective:     Vital Signs (Most Recent):  Temp: 98 °F (36.7 °C) (08/07/23 0847)  Pulse: 105 (08/07/23 2033)  Resp: (!) 24 (08/07/23 2011)  BP: 117/66 (08/07/23 2033)  SpO2: 96 % (08/07/23 2033) Vital Signs (24h Range):  Temp:  [98 °F (36.7 °C)-98.7 °F (37.1 °C)] 98 °F (36.7 °C)  Pulse:  [] 105  Resp:  [16-26] 24  SpO2:  [9 %-100 %] 96 %  BP: ()/(41-74) 117/66     Weight: 93 kg (205 lb)  Body mass index is 31.17 kg/m².    SpO2: 96 %         Intake/Output Summary (Last 24 hours) at 8/7/2023 2141  Last data filed at 8/7/2023 0722  Gross per 24 hour   Intake 1000 ml   Output 300 ml   Net 700 ml       Lines/Drains/Airways       Peripheral Intravenous Line  Duration                  Peripheral IV - Single Lumen 08/06/23 2150 18 G Right Antecubital <1 day                     Physical Exam  Vitals reviewed.   Constitutional:       Appearance: He is well-developed.   HENT:      Head: Normocephalic.   Eyes:      Conjunctiva/sclera: Conjunctivae normal.      Pupils: Pupils are equal, round, and reactive to light.   Cardiovascular:      Rate and Rhythm: Normal rate and regular rhythm.      Heart sounds: Normal heart sounds.   Pulmonary:      Effort: Pulmonary effort is normal.      Breath sounds: Normal breath sounds.   Abdominal:      General: Bowel sounds are normal.      Palpations: Abdomen is soft.   Musculoskeletal:      Cervical back: Normal range of motion and neck supple.   Skin:     General: Skin is warm.   Neurological:      Mental Status: He is alert and oriented to person, place, and time.          Significant Labs:  "BMP:   Recent Labs   Lab 08/07/23  0716 08/07/23  1202   * 132*  132*   * 133*  133*   K 3.6 3.5  3.5   CL 95 94*  94*   CO2 23 26 26   BUN 85* 87*  87*   CREATININE 7.5* 7.5*  7.5*   CALCIUM 8.6* 8.6*  8.6*   MG 2.1  --    , CMP   Recent Labs   Lab 08/07/23  0716 08/07/23  1202   * 133*  133*   K 3.6 3.5  3.5   CL 95 94*  94*   CO2 23 26 26   * 132*  132*   BUN 85* 87*  87*   CREATININE 7.5* 7.5*  7.5*   CALCIUM 8.6* 8.6*  8.6*   PROT  --  6.3  6.3   ALBUMIN  --  2.2*  2.2*   BILITOT  --  0.8  0.8   ALKPHOS  --  87  87   AST  --  66*  66*   ALT  --  73*  73*   ANIONGAP 14 13  13   , CBC   Recent Labs   Lab 08/07/23  0716 08/07/23 1936   WBC 16.07* 15.37*   HGB 12.9* 13.0*   HCT 37.6* 37.3*    186   , INR   Recent Labs   Lab 08/07/23  1936   INR 1.3*   , Lipid Panel No results for input(s): "CHOL", "HDL", "LDLCALC", "TRIG", "CHOLHDL" in the last 48 hours., Troponin No results for input(s): "TROPONINI" in the last 48 hours., and All pertinent lab results from the last 24 hours have been reviewed.    Significant Imaging: Echocardiogram: Transthoracic echo (TTE) complete (Cupid Only):   Results for orders placed or performed during the hospital encounter of 08/06/23   Echo   Result Value Ref Range    LVOT stroke volume 93.88 cm3    LVIDd 4.57 3.5 - 6.0 cm    LV Systolic Volume 33.45 mL    LVIDs 2.95 2.1 - 4.0 cm    LV Diastolic Volume 95.78 mL    IVS 1.29 (A) 0.6 - 1.1 cm    LVOT diameter 2.01 cm    LVOT area 3.2 cm2    FS 35 28 - 44 %    Left Ventricle Relative Wall Thickness 0.56 cm    Posterior Wall 1.28 (A) 0.6 - 1.1 cm    LV mass 224.03 g    MV Peak E Gonzalez 0.79 m/s    TDI LATERAL 0.07 m/s    TDI SEPTAL 0.05 m/s    E/E' ratio 13.17 m/s    MV Peak A Gonzalez 0.79 m/s    TR Max Gonzalez 2.24 m/s    E/A ratio 1.00     IVRT 140.82 msec    E wave deceleration time 207.32 msec    LV SEPTAL E/E' RATIO 15.80 m/s    LV LATERAL E/E' RATIO 11.29 m/s    LVOT peak gonzalez 1.31 m/s    " Left Ventricular Outflow Tract Mean Velocity 0.82 cm/s    Left Ventricular Outflow Tract Mean Gradient 3.37 mmHg    LA size 4.11 cm    Left Atrium Major Axis 5.80 cm    Left Atrium Minor Axis 5.83 cm    RV S' 16.17 cm/s    TAPSE 2.14 cm    RA Major Axis 6.64 cm    AV mean gradient 9 mmHg    AV peak gradient 17 mmHg    Ao peak ginny 2.06 m/s    Ao VTI 37.70 cm    LVOT peak VTI 29.60 cm    AV valve area 2.49 cm²    AV Velocity Ratio 0.64     AV index (prosthetic) 0.79     DELMER by Velocity Ratio 2.02 cm²    MV mean gradient 1 mmHg    MV peak gradient 4 mmHg    MV stenosis pressure 1/2 time 60.12 ms    MV valve area p 1/2 method 3.66 cm2    MV valve area by continuity eq 3.48 cm2    MV VTI 27.0 cm    Triscuspid Valve Regurgitation Peak Gradient 20 mmHg    PV PEAK VELOCITY 1.07 m/s    PV peak gradient 5 mmHg    Sinus 3.55 cm    STJ 1.93 cm    Ascending aorta 2.73 cm    IVC diameter 1.74 cm    Mean e' 0.06 m/s    RVDD 4.55 cm    LA volume 97.51 cm3    LA WIDTH 4.8 cm    RA Width 4.2 cm    TV resting pulmonary artery pressure 23 mmHg    RV TB RVSP 5 mmHg    Est. RA pres 3 mmHg    Narrative      Left Ventricle: The left ventricle is normal in size. Mildly increased   wall thickness. Normal wall motion. There is normal systolic function with   a visually estimated ejection fraction of 65 - 70%. Grade I diastolic   dysfunction.    Left Atrium: Left atrium is severely dilated.    Right Ventricle: Normal right ventricular cavity size. Systolic   function is normal.    Right Atrium: Right atrium is moderately dilated.    Aortic Valve: There is aortic valve sclerosis.    Tricuspid Valve: There is mild regurgitation.    Pulmonary Artery: The estimated pulmonary artery systolic pressure is   23 mmHg.    IVC/SVC: Normal venous pressure at 3 mmHg.

## 2023-08-08 NOTE — PROGRESS NOTES
[facility] [place] [facility]  Cardiology  Progress Note    Patient Name: Vivek Whitman  MRN: [id number]  Admission Date: 8/6/2023  Hospital Length of Stay: 1 days  Code Status: Full Code   Attending Physician: Glynn Blunt MD   Primary Care Physician: No, Primary Doctor  Expected Discharge Date:   Principal Problem:LAM (acute kidney injury)    Subjective:       Interval History:  Continues to be in AFib with RVR today.  On amiodarone drip      Past Medical History:   Diagnosis Date    Hypertension        Past Surgical History:   Procedure Laterality Date    HERNIA REPAIR         Review of patient's allergies indicates:  No Known Allergies    No current facility-administered medications on file prior to encounter.     Current Outpatient Medications on File Prior to Encounter   Medication Sig    amlodipine-olmesartan (CHERI) 5-40 mg per tablet Take 1 tablet by mouth once daily.    aspirin 81 MG Chew Take 1 tablet (81 mg total) by mouth once daily.    carvediloL (COREG) 25 MG tablet Take 25 mg by mouth 2 (two) times daily.    chlorthalidone (HYGROTEN) 25 MG Tab Take 25 mg by mouth once daily.    rosuvastatin (CRESTOR) 20 MG tablet Take 20 mg by mouth once daily.    naloxone (NARCAN) 1 mg/mL injection 2 mg (1 mg per nostril) by Nasal route as needed for opioid overdose; may repeat in 3 to 5 minutes if not effective. Call 911 (Patient not taking: Reported on 8/7/2023)    nicotine (NICODERM CQ) 7 mg/24 hr Place 1 patch onto the skin once daily.     Family History    None       Tobacco Use    Smoking status: Every Day     Current packs/day: 1.00     Types: Cigarettes    Smokeless tobacco: Not on file   Substance and Sexual Activity    Alcohol use: Yes     Alcohol/week: 12.0 standard drinks of alcohol     Types: 12 Cans of beer per week    Drug use: No    Sexual activity: Not on file     Review of Systems   Constitutional: Positive for malaise/fatigue.   HENT: Negative.     Eyes: Negative.    Cardiovascular:   Positive for dyspnea on exertion and palpitations.   Respiratory:  Positive for shortness of breath.    Endocrine: Negative.    Hematologic/Lymphatic: Negative.    Skin: Negative.    Musculoskeletal: Negative.    Gastrointestinal: Negative.    Genitourinary: Negative.    Neurological: Negative.    Psychiatric/Behavioral: Negative.     Allergic/Immunologic: Negative.      Objective:     Vital Signs (Most Recent):  Temp: 98.7 °F (37.1 °C) (08/08/23 0300)  Pulse: 104 (08/08/23 0940)  Resp: 17 (08/08/23 0940)  BP: (!) 113/57 (08/08/23 0940)  SpO2: (!) 92 % (08/08/23 0940) Vital Signs (24h Range):  Temp:  [98.3 °F (36.8 °C)-98.7 °F (37.1 °C)] 98.7 °F (37.1 °C)  Pulse:  [] 104  Resp:  [0-34] 17  SpO2:  [75 %-99 %] 92 %  BP: ()/(49-97) 113/57     Weight: 93 kg (205 lb 0.4 oz)  Body mass index is 31.17 kg/m².    SpO2: (!) 92 %         Intake/Output Summary (Last 24 hours) at 8/8/2023 0954  Last data filed at 8/8/2023 0600  Gross per 24 hour   Intake --   Output 850 ml   Net -850 ml         Lines/Drains/Airways       Peripheral Intravenous Line  Duration                  Peripheral IV - Single Lumen 08/06/23 2150 18 G Right Antecubital 1 day                     Physical Exam  Vitals reviewed.   Constitutional:       Appearance: He is well-developed.   HENT:      Head: Normocephalic.   Eyes:      Conjunctiva/sclera: Conjunctivae normal.      Pupils: Pupils are equal, round, and reactive to light.   Cardiovascular:      Rate and Rhythm: Normal rate and regular rhythm.      Heart sounds: Normal heart sounds.   Pulmonary:      Effort: Pulmonary effort is normal.      Breath sounds: Normal breath sounds.   Abdominal:      General: Bowel sounds are normal.      Palpations: Abdomen is soft.   Musculoskeletal:      Cervical back: Normal range of motion and neck supple.   Skin:     General: Skin is warm.   Neurological:      Mental Status: He is alert and oriented to person, place, and time.          Significant Labs:  "BMP:   Recent Labs   Lab 08/07/23  0716 08/07/23  1202 08/08/23  0522   * 132*  132* 108   * 133*  133* 132*   K 3.6 3.5  3.5 3.4*   CL 95 94*  94* 94*   CO2 23 26 26 23   BUN 85* 87*  87* 84*   CREATININE 7.5* 7.5*  7.5* 6.9*   CALCIUM 8.6* 8.6*  8.6* 8.7   MG 2.1  --  1.9     , CMP   Recent Labs   Lab 08/07/23  0716 08/07/23  1202 08/08/23 0522   * 133*  133* 132*   K 3.6 3.5  3.5 3.4*   CL 95 94*  94* 94*   CO2 23 26 26 23   * 132*  132* 108   BUN 85* 87*  87* 84*   CREATININE 7.5* 7.5*  7.5* 6.9*   CALCIUM 8.6* 8.6*  8.6* 8.7   PROT  --  6.3  6.3  --    ALBUMIN  --  2.2*  2.2*  --    BILITOT  --  0.8  0.8  --    ALKPHOS  --  87  87  --    AST  --  66*  66*  --    ALT  --  73*  73*  --    ANIONGAP 14 13  13 15     , CBC   Recent Labs   Lab 08/07/23  0716 08/07/23 1936 08/08/23 0522   WBC 16.07* 15.37* 13.16*  13.16*   HGB 12.9* 13.0* 10.8*  10.8*   HCT 37.6* 37.3* 31.6*  31.6*    186 169  169     , INR   Recent Labs   Lab 08/07/23 1936   INR 1.3*     , Lipid Panel No results for input(s): "CHOL", "HDL", "LDLCALC", "TRIG", "CHOLHDL" in the last 48 hours., Troponin No results for input(s): "TROPONINI" in the last 48 hours., and All pertinent lab results from the last 24 hours have been reviewed.    Significant Imaging: Echocardiogram: Transthoracic echo (TTE) complete (Cupid Only):   Results for orders placed or performed during the hospital encounter of 08/06/23   Echo   Result Value Ref Range    LVOT stroke volume 93.88 cm3    LVIDd 4.57 3.5 - 6.0 cm    LV Systolic Volume 33.45 mL    LVIDs 2.95 2.1 - 4.0 cm    LV Diastolic Volume 95.78 mL    IVS 1.29 (A) 0.6 - 1.1 cm    LVOT diameter 2.01 cm    LVOT area 3.2 cm2    FS 35 28 - 44 %    Left Ventricle Relative Wall Thickness 0.56 cm    Posterior Wall 1.28 (A) 0.6 - 1.1 cm    LV mass 224.03 g    MV Peak E Gonzalez 0.79 m/s    TDI LATERAL 0.07 m/s    TDI SEPTAL 0.05 m/s    E/E' ratio 13.17 m/s    MV Peak A " Gonzalez 0.79 m/s    TR Max Gonzalez 2.24 m/s    E/A ratio 1.00     IVRT 140.82 msec    E wave deceleration time 207.32 msec    LV SEPTAL E/E' RATIO 15.80 m/s    LV LATERAL E/E' RATIO 11.29 m/s    LVOT peak gonzalez 1.31 m/s    Left Ventricular Outflow Tract Mean Velocity 0.82 cm/s    Left Ventricular Outflow Tract Mean Gradient 3.37 mmHg    LA size 4.11 cm    Left Atrium Major Axis 5.80 cm    Left Atrium Minor Axis 5.83 cm    RV S' 16.17 cm/s    TAPSE 2.14 cm    RA Major Axis 6.64 cm    AV mean gradient 9 mmHg    AV peak gradient 17 mmHg    Ao peak gonzalez 2.06 m/s    Ao VTI 37.70 cm    LVOT peak VTI 29.60 cm    AV valve area 2.49 cm²    AV Velocity Ratio 0.64     AV index (prosthetic) 0.79     DELMER by Velocity Ratio 2.02 cm²    MV mean gradient 1 mmHg    MV peak gradient 4 mmHg    MV stenosis pressure 1/2 time 60.12 ms    MV valve area p 1/2 method 3.66 cm2    MV valve area by continuity eq 3.48 cm2    MV VTI 27.0 cm    Triscuspid Valve Regurgitation Peak Gradient 20 mmHg    PV PEAK VELOCITY 1.07 m/s    PV peak gradient 5 mmHg    Sinus 3.55 cm    STJ 1.93 cm    Ascending aorta 2.73 cm    IVC diameter 1.74 cm    Mean e' 0.06 m/s    RVDD 4.55 cm    LA volume 97.51 cm3    LA WIDTH 4.8 cm    RA Width 4.2 cm    TV resting pulmonary artery pressure 23 mmHg    RV TB RVSP 5 mmHg    Est. RA pres 3 mmHg    Narrative      Left Ventricle: The left ventricle is normal in size. Mildly increased   wall thickness. Normal wall motion. There is normal systolic function with   a visually estimated ejection fraction of 65 - 70%. Grade I diastolic   dysfunction.    Left Atrium: Left atrium is severely dilated.    Right Ventricle: Normal right ventricular cavity size. Systolic   function is normal.    Right Atrium: Right atrium is moderately dilated.    Aortic Valve: There is aortic valve sclerosis.    Tricuspid Valve: There is mild regurgitation.    Pulmonary Artery: The estimated pulmonary artery systolic pressure is   23 mmHg.    IVC/SVC:  Normal venous pressure at 3 mmHg.       Assessment and Plan:     Brief HPI:     * LAM (acute kidney injury)  Iv hydration    A-fib  afib with rvr. Likely ppted by lam.  Heparin drip. amio drip for rate control.   Silver cv in am if continues to be in afib  Not a candidate for long term amio because of severe pulm disease.    8/8:  Continues to be in AFib with RVR.  Will do SILVER and cardioversion today    -No absolute contraindications of esophageal stricture, tumor, perforation, laceration,or diverticulum and/or active GI bleed  -The risks, benefits & alternatives of the procedure were explained to the patient.   -The risks of transesophageal echo include but are not limited to:  Dental trauma, esophageal trauma/perforation, bleeding, laryngospasm/brochospasm, aspiration, sore throat/hoarseness, & dislodgement of the endotracheal tube/nasogastric tube (where applicable).    -The risks of ANES monitored sedation include hypotension, respiratory depression, arrhythmias, bronchospasm, & death.    -Informed consent was obtained. The patient is agreeable to proceed with the procedure and all questions and concerns addressed.      Risks, benefits and alternatives of the SILVER/Cardioversion procedure were discussed with the patient including throat irritation, aspiration, anesthetic complications, esophageal irritation/perforation, skin irritation, arrhythmia, etc.  Patient understands and agrees to proceed.  Consent was placed on the chart.      Hypotension  Likely secondry to dehydration. Continue to monitor response to iv hydration. Hold anti hypertensives. Avoid nephrotoxic agents    Dehydration  Iv hydration    Tobacco abuse        Heroin abuse  States is now clean        VTE Risk Mitigation (From admission, onward)         Ordered     heparin 25,000 units in dextrose 5% (100 units/ml) IV bolus from bag - ADDITIONAL PRN BOLUS - 60 units/kg  As needed (PRN)        Question:  Heparin Infusion Adjustment (DO NOT MODIFY ANSWER)   Answer:  \\[email or web address]\epic\Images\Pharmacy\HeparinInfusions\heparin LOW INTENSITY nomogram for [facility] [id number].pdf    08/07/23 1906     heparin 25,000 units in dextrose 5% (100 units/ml) IV bolus from bag - ADDITIONAL PRN BOLUS - 30 units/kg  As needed (PRN)        Question:  Heparin Infusion Adjustment (DO NOT MODIFY ANSWER)  Answer:  \\[email or web address]\epic\Images\Pharmacy\HeparinInfusions\heparin LOW INTENSITY nomogram for [facility] [id number].pdf    08/07/23 1906     heparin 25,000 units in dextrose 5% 250 mL (100 units/mL) infusion LOW INTENSITY nomogram - [facility]  Continuous        Question Answer Comment   Heparin Infusion Adjustment (DO NOT MODIFY ANSWER) \\[email or web address]\epic\Images\Pharmacy\HeparinInfusions\heparin LOW INTENSITY nomogram for [facility] [id number].pdf    Begin at (in units/kg/hr) 12        08/07/23 1906     IP VTE LOW RISK PATIENT  Once         08/07/23 0211     Place sequential compression device  Until discontinued         08/07/23 0211                Dank Claudio MD  Cardiology  [facility] - Intensive Care      Critical Care Time: 35 minutes     Critical care was time spent personally by me on the following activities: development of treatment plan with patient or surrogate and bedside caregivers, discussions with consultants, evaluation of patient's response to treatment, examination of patient, ordering and performing treatments and interventions, ordering and review of laboratory studies, ordering and review of radiographic studies, pulse oximetry, re-evaluation of patient's condition. This critical care time did not overlap with that of any other provider or involve time for any procedures.

## 2023-08-08 NOTE — ASSESSMENT & PLAN NOTE
This patient does not have evidence of infective focus  My overall impression is sepsis.  Source: Unknown  Antibiotics given-   Antibiotics (72h ago, onward)    Start     Stop Route Frequency Ordered    08/08/23 1015  ceFEPIme (MAXIPIME) 1 g in dextrose 5 % in water (D5W) 100 mL IVPB (MB+)         -- IV Every 12 hours (non-standard times) 08/08/23 0901    08/08/23 1015  doxycycline tablet 100 mg         -- Oral Every 12 hours 08/08/23 0901    08/08/23 0900  mupirocin 2 % ointment         08/13/23 0859 Nasl 2 times daily 08/08/23 0725        Latest lactate reviewed-  Recent Labs   Lab 08/08/23  0807   LACTATE 0.8     Organ dysfunction indicated by Acute kidney injury and New afib    Fluid challenge Actual Body weight- Patient will receive 30ml/kg actual body weight to calculate fluid bolus for treatment of septic shock.     Post- resuscitation assessment Yes Perfusion exam was performed within 6 hours of septic shock presentation after bolus shows Adequate tissue perfusion assessed by non-invasive monitoring       Will ordered but not started, responding to fluids Pressors- Levophed for MAP of 65  Source control achieved by: Abx and fluids    · Patient admitted with hypotension and leukocytosis, and feeling unwell for the past couple weeks.  Patient's blood pressure again low today at 70/49, concerns he could bacteremia or another infection.  No skin infections appreciated. Urine clean. No consolidation on CXR.  · Initial presentation of AFib (and ARF) often in the setting of infection due to catecholamine release and hypotension, as well.    · He is afebrile, however this does not exclude infection.    · Blood cultures taken.  , also concerning for an ongoing inflammatory issue/infection.    · Given acute renal failure, will hold off on vancomycin for now.    · Will initiate IV cefepime and oral doxycycline.   · Will await cultures to tailor antibiotics or deescalate.

## 2023-08-08 NOTE — TRANSFER OF CARE
"Anesthesia Transfer of Care Note    Patient: Vivek Whitman    Procedure(s) Performed: Procedure(s) (LRB):  Transesophageal echo (KASHIF) intra-procedure log documentation (N/A)  ECHOCARDIOGRAM, TRANSESOPHAGEAL (N/A)  Cardioversion (N/A)    Patient location: Other: OR    Anesthesia Type: MAC    Transport from OR: Transported from OR on 100% O2 by closed face mask with adequate spontaneous ventilation    Post pain: adequate analgesia    Post assessment: no apparent anesthetic complications    Post vital signs: stable    Level of consciousness: lethargic    Nausea/Vomiting: no nausea/vomiting    Complications: none    Transfer of care protocol was followedComments: Report to Ranulfo MERLOS      Last vitals:   Visit Vitals  /66   Pulse 105   Temp 37.1 °C (98.7 °F) (Oral)   Resp 16   Ht 5' 8" (1.727 m)   Wt 93 kg (205 lb 0.4 oz)   SpO2 (!) 94%   BMI 31.17 kg/m²     "

## 2023-08-08 NOTE — NURSING
[facility]  Nurses Note -- 4 Eyes      8/8/2023       Skin assessed on: Admit      [x] No Pressure Injuries Present    []Prevention Measures Documented    [] Yes LDA  for Pressure Injury Previously documented     [] Yes New Pressure Injury Discovered   [] LDA for New Pressure Injury Added      Attending RN:  Luma Pollock RN     Second RN:  GAURANG Helton

## 2023-08-09 LAB
ANION GAP SERPL CALC-SCNC: 16 MMOL/L (ref 8–16)
BASOPHILS # BLD AUTO: 0.04 K/UL (ref 0–0.2)
BASOPHILS NFR BLD: 0.3 % (ref 0–1.9)
BUN SERPL-MCNC: 80 MG/DL (ref 8–23)
CALCIUM SERPL-MCNC: 8.5 MG/DL (ref 8.7–10.5)
CHLORIDE SERPL-SCNC: 96 MMOL/L (ref 95–110)
CO2 SERPL-SCNC: 22 MMOL/L (ref 23–29)
CREAT SERPL-MCNC: 6.1 MG/DL (ref 0.5–1.4)
DIFFERENTIAL METHOD: ABNORMAL
EOSINOPHIL # BLD AUTO: 0 K/UL (ref 0–0.5)
EOSINOPHIL NFR BLD: 0.2 % (ref 0–8)
ERYTHROCYTE [DISTWIDTH] IN BLOOD BY AUTOMATED COUNT: 14 % (ref 11.5–14.5)
EST. GFR  (NO RACE VARIABLE): 10 ML/MIN/1.73 M^2
GLUCOSE SERPL-MCNC: 107 MG/DL (ref 70–110)
HCT VFR BLD AUTO: 35.8 % (ref 40–54)
HGB BLD-MCNC: 12.3 G/DL (ref 14–18)
IMM GRANULOCYTES # BLD AUTO: 0.1 K/UL (ref 0–0.04)
IMM GRANULOCYTES NFR BLD AUTO: 0.8 % (ref 0–0.5)
LYMPHOCYTES # BLD AUTO: 1.1 K/UL (ref 1–4.8)
LYMPHOCYTES NFR BLD: 8.2 % (ref 18–48)
MAGNESIUM SERPL-MCNC: 1.6 MG/DL (ref 1.6–2.6)
MCH RBC QN AUTO: 30.1 PG (ref 27–31)
MCHC RBC AUTO-ENTMCNC: 34.4 G/DL (ref 32–36)
MCV RBC AUTO: 88 FL (ref 82–98)
MONOCYTES # BLD AUTO: 1.5 K/UL (ref 0.3–1)
MONOCYTES NFR BLD: 11.8 % (ref 4–15)
NEUTROPHILS # BLD AUTO: 10.3 K/UL (ref 1.8–7.7)
NEUTROPHILS NFR BLD: 78.7 % (ref 38–73)
NRBC BLD-RTO: 0 /100 WBC
PHOSPHATE SERPL-MCNC: 4.8 MG/DL (ref 2.7–4.5)
PLATELET # BLD AUTO: 180 K/UL (ref 150–450)
PMV BLD AUTO: 10.2 FL (ref 9.2–12.9)
POTASSIUM SERPL-SCNC: 3.6 MMOL/L (ref 3.5–5.1)
RBC # BLD AUTO: 4.09 M/UL (ref 4.6–6.2)
SODIUM SERPL-SCNC: 134 MMOL/L (ref 136–145)
WBC # BLD AUTO: 13.06 K/UL (ref 3.9–12.7)

## 2023-08-09 PROCEDURE — 21400001 HC TELEMETRY ROOM

## 2023-08-09 PROCEDURE — 93005 ELECTROCARDIOGRAM TRACING: CPT

## 2023-08-09 PROCEDURE — 25000003 PHARM REV CODE 250: Performed by: INTERNAL MEDICINE

## 2023-08-09 PROCEDURE — 93010 EKG 12-LEAD: ICD-10-PCS | Mod: ,,, | Performed by: INTERNAL MEDICINE

## 2023-08-09 PROCEDURE — 25000003 PHARM REV CODE 250: Performed by: STUDENT IN AN ORGANIZED HEALTH CARE EDUCATION/TRAINING PROGRAM

## 2023-08-09 PROCEDURE — S4991 NICOTINE PATCH NONLEGEND: HCPCS | Performed by: INTERNAL MEDICINE

## 2023-08-09 PROCEDURE — 94761 N-INVAS EAR/PLS OXIMETRY MLT: CPT

## 2023-08-09 PROCEDURE — 63600175 PHARM REV CODE 636 W HCPCS: Performed by: INTERNAL MEDICINE

## 2023-08-09 PROCEDURE — 84100 ASSAY OF PHOSPHORUS: CPT | Performed by: INTERNAL MEDICINE

## 2023-08-09 PROCEDURE — 36415 COLL VENOUS BLD VENIPUNCTURE: CPT | Performed by: INTERNAL MEDICINE

## 2023-08-09 PROCEDURE — 94640 AIRWAY INHALATION TREATMENT: CPT

## 2023-08-09 PROCEDURE — 93010 ELECTROCARDIOGRAM REPORT: CPT | Mod: 59,,, | Performed by: INTERNAL MEDICINE

## 2023-08-09 PROCEDURE — 80048 BASIC METABOLIC PNL TOTAL CA: CPT | Performed by: INTERNAL MEDICINE

## 2023-08-09 PROCEDURE — 25000003 PHARM REV CODE 250

## 2023-08-09 PROCEDURE — 83735 ASSAY OF MAGNESIUM: CPT | Performed by: INTERNAL MEDICINE

## 2023-08-09 PROCEDURE — 25000242 PHARM REV CODE 250 ALT 637 W/ HCPCS: Performed by: INTERNAL MEDICINE

## 2023-08-09 PROCEDURE — 85025 COMPLETE CBC W/AUTO DIFF WBC: CPT | Performed by: INTERNAL MEDICINE

## 2023-08-09 PROCEDURE — 99291 CRITICAL CARE FIRST HOUR: CPT | Mod: ,,, | Performed by: INTERNAL MEDICINE

## 2023-08-09 PROCEDURE — 63600175 PHARM REV CODE 636 W HCPCS: Performed by: STUDENT IN AN ORGANIZED HEALTH CARE EDUCATION/TRAINING PROGRAM

## 2023-08-09 PROCEDURE — 94760 N-INVAS EAR/PLS OXIMETRY 1: CPT

## 2023-08-09 PROCEDURE — 99291 PR CRITICAL CARE, E/M 30-74 MINUTES: ICD-10-PCS | Mod: ,,, | Performed by: INTERNAL MEDICINE

## 2023-08-09 PROCEDURE — 93010 ELECTROCARDIOGRAM REPORT: CPT | Mod: ,,, | Performed by: INTERNAL MEDICINE

## 2023-08-09 RX ORDER — METOPROLOL TARTRATE 1 MG/ML
INJECTION, SOLUTION INTRAVENOUS
Status: COMPLETED
Start: 2023-08-09 | End: 2023-08-09

## 2023-08-09 RX ORDER — METOPROLOL TARTRATE 1 MG/ML
5 INJECTION, SOLUTION INTRAVENOUS ONCE
Status: DISCONTINUED | OUTPATIENT
Start: 2023-08-10 | End: 2023-08-13 | Stop reason: SDUPTHER

## 2023-08-09 RX ORDER — ALLOPURINOL 100 MG/1
100 TABLET ORAL DAILY
Status: DISCONTINUED | OUTPATIENT
Start: 2023-08-09 | End: 2023-08-15 | Stop reason: HOSPADM

## 2023-08-09 RX ADMIN — METOROPROLOL TARTRATE 5 MG: 5 INJECTION, SOLUTION INTRAVENOUS at 10:08

## 2023-08-09 RX ADMIN — SODIUM CHLORIDE, POTASSIUM CHLORIDE, SODIUM LACTATE AND CALCIUM CHLORIDE: 600; 310; 30; 20 INJECTION, SOLUTION INTRAVENOUS at 01:08

## 2023-08-09 RX ADMIN — CEFEPIME 1 G: 1 INJECTION, POWDER, FOR SOLUTION INTRAMUSCULAR; INTRAVENOUS at 09:08

## 2023-08-09 RX ADMIN — ALLOPURINOL 200 MG: 100 TABLET ORAL at 08:08

## 2023-08-09 RX ADMIN — ASPIRIN 81 MG: 81 TABLET, COATED ORAL at 08:08

## 2023-08-09 RX ADMIN — ALLOPURINOL 100 MG: 100 TABLET ORAL at 11:08

## 2023-08-09 RX ADMIN — DOXYCYCLINE HYCLATE 100 MG: 100 TABLET, COATED ORAL at 09:08

## 2023-08-09 RX ADMIN — FAMOTIDINE 20 MG: 20 TABLET, FILM COATED ORAL at 08:08

## 2023-08-09 RX ADMIN — ATORVASTATIN CALCIUM 40 MG: 40 TABLET, FILM COATED ORAL at 09:08

## 2023-08-09 RX ADMIN — MUPIROCIN: 20 OINTMENT TOPICAL at 08:08

## 2023-08-09 RX ADMIN — IPRATROPIUM BROMIDE AND ALBUTEROL SULFATE 3 ML: 2.5; .5 SOLUTION RESPIRATORY (INHALATION) at 01:08

## 2023-08-09 RX ADMIN — DOXYCYCLINE HYCLATE 100 MG: 100 TABLET, COATED ORAL at 01:08

## 2023-08-09 RX ADMIN — NICOTINE 1 PATCH: 7 PATCH, EXTENDED RELEASE TRANSDERMAL at 08:08

## 2023-08-09 RX ADMIN — APIXABAN 5 MG: 5 TABLET, FILM COATED ORAL at 09:08

## 2023-08-09 RX ADMIN — AMIODARONE HYDROCHLORIDE 0.5 MG/MIN: 1.8 INJECTION, SOLUTION INTRAVENOUS at 11:08

## 2023-08-09 RX ADMIN — DOXYCYCLINE HYCLATE 100 MG: 100 TABLET, COATED ORAL at 08:08

## 2023-08-09 RX ADMIN — CEFEPIME 1 G: 1 INJECTION, POWDER, FOR SOLUTION INTRAMUSCULAR; INTRAVENOUS at 10:08

## 2023-08-09 RX ADMIN — IPRATROPIUM BROMIDE AND ALBUTEROL SULFATE 3 ML: 2.5; .5 SOLUTION RESPIRATORY (INHALATION) at 08:08

## 2023-08-09 RX ADMIN — APIXABAN 5 MG: 5 TABLET, FILM COATED ORAL at 08:08

## 2023-08-09 NOTE — PLAN OF CARE
Case management Reassesment    PCP: None.  Last seen at [facility].  Cardiologist is Dr. Carnes @ [place]     Patient Arrived From: Home with family  Existing Help at Home: Cousin, Paul, and his wife     Barriers to Discharge: None     Discharge Plan:               A. Home with family              B. Home     Patient discussed in rounds this am and is clear for stepdown to tele/med surg.     08/09/23 1129   Discharge Reassessment   Assessment Type Final Discharge Note   Did the patient's condition or plan change since previous assessment? Yes   Discharge Plan discussed with:   (Patient discussed in rounds this am)   Communicated ELODIA with patient/caregiver Yes   Discharge Plan A Home with family   Discharge Plan B Home with family   DME Needed Upon Discharge  none   Transition of Care Barriers None   Why the patient remains in the hospital Requires continued medical care

## 2023-08-09 NOTE — PROGRESS NOTES
[facility] Medicine  Progress Note    Patient Name: Vivek Whitman  MRN: [id number]  Patient Class: IP- Inpatient   Admission Date: 8/6/2023  Length of Stay: 2 days  Attending Physician: Glynn Blunt MD  Primary Care Provider: Racheal, Primary Doctor        Subjective:     Principal Problem:ARF (acute renal failure)        HPI:   62 y.o. male, with a PMHx of HTN, HLD, tobacco abuse, and hx of heroin abuse/alcohol abuse transferred from [facility] ED for further evaluation. He presented to the ED with cocerns of low blood pressure. Stated for the past week, he had low blood pressure readings with SBP 90-100s and DBP 50s. Noted hypotension associated with feeling fatigue, weak, and make him just want to sleep all day. Stated he has not eat or drink much. However, continued to take his home BP medications. Tried to hydrate himself with gatorade prior to coming to the ED. Denies any fever, chest pain, cough, shortness of breath, abdominal pain, nausea or vomiting. Admits he still smokes 1ppd, smoke for the past 40+ years.     In the ED, pt hypotensive with BP 94/53. Patient given IVF. Labs significant for leukocytosis, elevated Cr of 7.4, and elevated phosphorus. US renal with no evidence for hydronephrosis bilaterally.  CXR with no focal consolidations or edema. Patient admitted to  for further evaluation.       Overview/Hospital Course:  Patient admitted with hypotension and leukocytosis, and feeling unwell for the past couple weeks.  Patient's blood pressure again low today at 70/49, concerns he could bacteremia or another infection.  Initial presentation of AFib (and ARF) often in the setting of infection due to catecholamine release and hypotension, as well.  He is afebrile, however this does not exclude infection. Hx of IVDU makes him higher risk. Blood cultures taken.  , also concerning for an ongoing inflammatory issue/infection.  Given acute renal failure, will hold off on vancomycin for  now.  Will initiate IV cefepime and oral doxycycline.  Will await cultures to tailor antibiotics or deescalate.  8 a.m. cortisol was WNL.  Creatinine slowly improving with fluids. S/p successful DCCV, now in NSR. Improving overall. Urine picking up. S/d to  floor.       Interval History:  NAEON.  No new issues.   Denies complaints.  UOP 1.8L /24h  All questions answered and updates on care given.       ROS:  General: Negative for fevers or chills.  Cardiac: Negative for chest pain or orthopnea   Pulmonary: Negative for dyspnea or wheezing.  GI: Negative for abdominal distention or pain     Vitals:    08/09/23 0605 08/09/23 0800 08/09/23 0839 08/09/23 1322   BP: (!) 107/54      BP Location:       Patient Position:       Pulse: 78  69 69   Resp: 12  18 18   Temp:  98.3 °F (36.8 °C)     TempSrc:       SpO2:   97%    Weight:       Height:              Body mass index is 31.17 kg/m².      PHYSICAL EXAM:  GENERAL APPEARANCE: alert and cooperative, and appears to be in no acute distress.  HEENT:     HEAD: NC/AT     EYES: PERRL, EOMI.  Vision is grossly intact.  NECK: Neck supple, non-tender without LAD, masses or thyromegaly.  CARDIAC: There is no peripheral edema, cyanosis or pallor.   LUNGS: Clear to auscultation and percussion without rales or wheezing  ABDOMEN: Non-distended. No guarding.  MSK: No joint erythema or tenderness.   EXTREMITIES: No significant deformity or joint abnormality. No edema.   NEUROLOGICAL: CN II-XII grossly intact.   SKIN: Skin normal color, texture and turgor with no lesions or eruptions.  PSYCHIATRIC: Oriented. No tangential speech. No Hyperactive features.        Recent Results (from the past 24 hour(s))   Procalcitonin    Collection Time: 08/08/23  4:06 PM   Result Value Ref Range    Procalcitonin 0.60 (H) <0.25 ng/mL   Magnesium    Collection Time: 08/09/23  6:01 AM   Result Value Ref Range    Magnesium 1.6 1.6 - 2.6 mg/dL   Phosphorus    Collection Time: 08/09/23  6:01 AM   Result Value  Ref Range    Phosphorus 4.8 (H) 2.7 - 4.5 mg/dL   CBC with Automated Differential    Collection Time: 08/09/23  6:01 AM   Result Value Ref Range    WBC 13.06 (H) 3.90 - 12.70 K/uL    RBC 4.09 (L) 4.60 - 6.20 M/uL    Hemoglobin 12.3 (L) 14.0 - 18.0 g/dL    Hematocrit 35.8 (L) 40.0 - 54.0 %    MCV 88 82 - 98 fL    MCH 30.1 27.0 - 31.0 pg    MCHC 34.4 32.0 - 36.0 g/dL    RDW 14.0 11.5 - 14.5 %    Platelets 180 150 - 450 K/uL    MPV 10.2 9.2 - 12.9 fL    Immature Granulocytes 0.8 (H) 0.0 - 0.5 %    Gran # (ANC) 10.3 (H) 1.8 - 7.7 K/uL    Immature Grans (Abs) 0.10 (H) 0.00 - 0.04 K/uL    Lymph # 1.1 1.0 - 4.8 K/uL    Mono # 1.5 (H) 0.3 - 1.0 K/uL    Eos # 0.0 0.0 - 0.5 K/uL    Baso # 0.04 0.00 - 0.20 K/uL    nRBC 0 0 /100 WBC    Gran % 78.7 (H) 38.0 - 73.0 %    Lymph % 8.2 (L) 18.0 - 48.0 %    Mono % 11.8 4.0 - 15.0 %    Eosinophil % 0.2 0.0 - 8.0 %    Basophil % 0.3 0.0 - 1.9 %    Differential Method Automated    Basic metabolic panel    Collection Time: 08/09/23  6:01 AM   Result Value Ref Range    Sodium 134 (L) 136 - 145 mmol/L    Potassium 3.6 3.5 - 5.1 mmol/L    Chloride 96 95 - 110 mmol/L    CO2 22 (L) 23 - 29 mmol/L    Glucose 107 70 - 110 mg/dL    BUN 80 (H) 8 - 23 mg/dL    Creatinine 6.1 (H) 0.5 - 1.4 mg/dL    Calcium 8.5 (L) 8.7 - 10.5 mg/dL    Anion Gap 16 8 - 16 mmol/L    eGFR 10 (A) >60 mL/min/1.73 m^2       Microbiology Results (last 7 days)       Procedure Component Value Units Date/Time    Blood culture [[id number]] Collected: 08/08/23 0819    Order Status: Completed Specimen: Blood Updated: 08/09/23 0903     Blood Culture, Routine No Growth to date      No Growth to date    Blood culture [[id number]] Collected: 08/08/23 0807    Order Status: Completed Specimen: Blood from Peripheral, Right Hand Updated: 08/09/23 0903     Blood Culture, Routine No Growth to date      No Growth to date             Imaging Results              US Retroperitoneal Complete (Final result)  Result time 08/07/23 03:33:50       Final result by Marquise Humphries MD (08/07/23 03:33:50)                   Impression:      There is no evidence for hydronephrosis bilaterally.    Mild elevated resistive index of the left kidney measuring 0.72 can be seen with medical renal disease.    Nonobstructing calculus of the right kidney.    Renal cysts are noted.      Electronically signed by: Marquise Humphries  Date:    08/07/2023  Time:    03:33               Narrative:    EXAMINATION:  US RETROPERITONEAL COMPLETE    CLINICAL HISTORY:  acute renal failure;    TECHNIQUE:  Ultrasound of the kidneys and urinary bladder was performed including color flow and Doppler evaluation of the kidneys.    COMPARISON:  None.    FINDINGS:  The right kidney measures approximately 11.1 cm in length.  There is no evidence for hydronephrosis.  Color imaging demonstrates flow to the right kidney with a resistive index measurement of 0.60.  At the lower pole of the right kidney there is a 4.1 mm hyperechoic focus that may relate to a small nonobstructing calculus.  At the upper pole of the right kidney there is a hypoechoic finding measuring approximately 11 x 6.9 by 13.4 mm with posterior wall enhancement and increased through transmission, most consistent with a small cyst.    The left kidney measures approximately 13 cm in length.  There is no evidence for hydronephrosis.  Color imaging demonstrates flow to the left kidney with a resistive index measurement of 0.72.  At the mid to upper pole of the left kidney there is an exophytic hypoechoic finding measuring approximately 3.2 x 2.6 x 2.7 cm in size, consistent with a cyst.    The urinary bladder is identified measuring approximately 5 x 7.3 x 5.4 cm with a calculated volume of 102.42 mL.  Postvoid imaging is not available.    The prostate measures approximately 3.2 x 3 x 4.4 cm in size with a calculated volume of 22 cc.                                       X-Ray Chest AP Portable (Final result)  Result time 08/07/23  00:42:12      Final result by Marquise Humphries MD (08/07/23 00:42:12)                   Impression:      There is no radiographic evidence for acute intrathoracic process.      Electronically signed by: Marquise Humphries  Date:    08/07/2023  Time:    00:42               Narrative:    EXAMINATION:  XR CHEST AP PORTABLE    CLINICAL HISTORY:  Hypotension, unspecified    TECHNIQUE:  Single frontal view of the chest was performed.    COMPARISON:  Chest radiograph March 13, 2021    FINDINGS:  AP portable chest radiographic examination is submitted.  There is mild diminished depth of inspiration.  When accounting for position and technique and depth of inspiration, the appearance of the cardiomediastinal silhouette is stable.  Aortic atherosclerotic change noted.    There is no evidence for confluent infiltrate or consolidation, significant pleural effusion or pneumothorax.    The osseous structures demonstrate chronic change.                                             Assessment/Plan:      * ARF (acute renal failure)  Patient with acute kidney injury/acute renal failure likely due to pre-renal azotemia due to dehydration LAM is currently worsening. Baseline creatinine unknown - Labs reviewed- Renal function/electrolytes with CrCl cannot be calculated (Unknown ideal weight.). according to latest data. Monitor urine output and serial BMP and adjust therapy as needed. Avoid nephrotoxins and renally dose meds for GFR listed above.    -Presented with LAM  -Cr on admit 7.4  -Baseline Cr unknown, but Cr 1.1 in 08/2022  -Suspect due to decreased PO intake and home meds (amolidpine-olmarsartan and chlorthalidone)  -Renal US with no obstruction. Findings consistent with CKD  -IVF  -Nephrology consulted: check FENA, pthi, and uric acid  -Avoid nephrotoxins, renal dose all meds.   -Monitor Is/Os  -Monitor       Sepsis  This patient does not have evidence of infective focus  My overall impression is sepsis.  Source:  Unknown  Antibiotics given-   Antibiotics (72h ago, onward)      Start     Stop Route Frequency Ordered    08/08/23 1015  ceFEPIme (MAXIPIME) 1 g in dextrose 5 % in water (D5W) 100 mL IVPB (MB+)         -- IV Every 12 hours (non-standard times) 08/08/23 0901    08/08/23 1015  doxycycline tablet 100 mg         -- Oral Every 12 hours 08/08/23 0901    08/08/23 0900  mupirocin 2 % ointment         08/13/23 0859 Nasl 2 times daily 08/08/23 0725          Latest lactate reviewed-  Recent Labs   Lab 08/08/23  0807   LACTATE 0.8     Organ dysfunction indicated by Acute kidney injury and New afib    Fluid challenge Actual Body weight- Patient will receive 30ml/kg actual body weight to calculate fluid bolus for treatment of septic shock.     Post- resuscitation assessment Yes Perfusion exam was performed within 6 hours of septic shock presentation after bolus shows Adequate tissue perfusion assessed by non-invasive monitoring       Will ordered but not started, responding to fluids Pressors- Levophed for MAP of 65  Source control achieved by: Abx and fluids    Patient admitted with hypotension and leukocytosis, and feeling unwell for the past couple weeks.  Patient's blood pressure again low today at 70/49, concerns he could bacteremia or another infection.  No skin infections appreciated. Urine clean. No consolidation on CXR.  Initial presentation of AFib (and ARF) often in the setting of infection due to catecholamine release and hypotension, as well.    He is afebrile, however this does not exclude infection.    Blood cultures taken.  , also concerning for an ongoing inflammatory issue/infection.    Given acute renal failure, will hold off on vancomycin for now.    Will initiate IV cefepime and oral doxycycline.   Will await cultures to tailor antibiotics or deescalate.      Class 1 obesity with body mass index (BMI) of 31.0 to 31.9 in adult  Body mass index is 31.17 kg/m². Morbid obesity complicates all aspects of  disease management from diagnostic modalities to treatment. Weight loss encouraged and health benefits explained to patient.         Leukocytosis  -WBC# 16.07 on admission w/hypotension. Pt afebrile. CXR with no acute process.   -Pt with leukocytosis, suspect reactive. Low suspicion for infection at this time.   -Hypotension improved with fluids  -Continue IVF  -Echo ordered  -Will monitor off abx at this time   -repeat labs in the AM     Hypotension  -BP as low as 63/41 in the ED  -Suspect due to pt continuing home meds (amolidpine-olmarsartan and chlorthalidone) in setting of LAM and decreased PO intake/dehydration  -Hypotension improved with fluids  -Continue IVF  -Pt with leukocytosis, suspect reactive. Low suspicion for infection at this time.   -Echo ordered  -Hold home BP meds as above  -Monitor     Dehydration  -Continue IVF as above  -See plan for LAM    Tobacco abuse  - Patient was counseled regarding smoking for 3-10 minutes.  - Encourage smoking cessation daily  - NRT offered         Heroin abuse  Negative on admission, but since IVDU hx higher risk for bacteremia        VTE Risk Mitigation (From admission, onward)           Ordered     apixaban tablet 5 mg  2 times daily         08/08/23 1634     IP VTE LOW RISK PATIENT  Once         08/07/23 0211     Place sequential compression device  Until discontinued         08/07/23 0211                    Discharge Planning   ELODIA:      Code Status: Full Code   Is the patient medically ready for discharge?:     Reason for patient still in hospital (select all that apply): Patient trending condition and Treatment  Discharge Plan A: Home with family            Critical care time spent on the evaluation and treatment of severe organ dysfunction, review of pertinent labs and imaging studies, discussions with consulting providers and discussions with patient/family: 35 minutes.      Glynn Blunt MD  Department of Hospital Medicine   [facility] - Intensive Care

## 2023-08-09 NOTE — PROGRESS NOTES
Awake alert oriented nad    Feeling better after cardioversion     Denies CNS ENT CP GI  RHEUM OR DERM SX  Past Medical History:   Diagnosis Date    Hypertension      Past Surgical History:   Procedure Laterality Date    HERNIA REPAIR       Social History     Socioeconomic History    Marital status: Single   Tobacco Use    Smoking status: Every Day     Current packs/day: 1.00     Types: Cigarettes   Substance and Sexual Activity    Alcohol use: Yes     Alcohol/week: 12.0 standard drinks of alcohol     Types: 12 Cans of beer per week    Drug use: No     History reviewed. No pertinent family history.,  Review of patient's allergies indicates:  No Known Allergies    Current Facility-Administered Medications   Medication    albuterol-ipratropium 2.5 mg-0.5 mg/3 mL nebulizer solution 3 mL    allopurinoL tablet 200 mg    aluminum-magnesium hydroxide-simethicone 200-200-20 mg/5 mL suspension 30 mL    apixaban tablet 5 mg    aspirin EC tablet 81 mg    atorvastatin tablet 40 mg    ceFEPIme (MAXIPIME) 1 g in dextrose 5 % in water (D5W) 100 mL IVPB (MB+)    dextrose 50% injection 12.5 g    dextrose 50% injection 25 g    doxycycline tablet 100 mg    famotidine tablet 20 mg    glucagon (human recombinant) injection 1 mg    glucose chewable tablet 16 g    glucose chewable tablet 24 g    lactated ringers infusion    mupirocin 2 % ointment    naloxone 0.4 mg/mL injection 0.02 mg    nicotine 7 mg/24 hr 1 patch    ondansetron injection 4 mg    sodium chloride 0.9% flush 10 mL       LABS    Recent Results (from the past 24 hour(s))   Transesophageal echo (KASHIF) with possible cardioversion    Collection Time: 08/08/23  1:29 PM   Result Value Ref Range    BSA 2.11 m2   Procalcitonin    Collection Time: 08/08/23  4:06 PM   Result Value Ref Range    Procalcitonin 0.60 (H) <0.25 ng/mL   Magnesium    Collection Time: 08/09/23  6:01 AM   Result Value Ref Range    Magnesium 1.6 1.6 - 2.6 mg/dL   Phosphorus    Collection Time: 08/09/23  6:01  AM   Result Value Ref Range    Phosphorus 4.8 (H) 2.7 - 4.5 mg/dL   CBC with Automated Differential    Collection Time: 08/09/23  6:01 AM   Result Value Ref Range    WBC 13.06 (H) 3.90 - 12.70 K/uL    RBC 4.09 (L) 4.60 - 6.20 M/uL    Hemoglobin 12.3 (L) 14.0 - 18.0 g/dL    Hematocrit 35.8 (L) 40.0 - 54.0 %    MCV 88 82 - 98 fL    MCH 30.1 27.0 - 31.0 pg    MCHC 34.4 32.0 - 36.0 g/dL    RDW 14.0 11.5 - 14.5 %    Platelets 180 150 - 450 K/uL    MPV 10.2 9.2 - 12.9 fL    Immature Granulocytes 0.8 (H) 0.0 - 0.5 %    Gran # (ANC) 10.3 (H) 1.8 - 7.7 K/uL    Immature Grans (Abs) 0.10 (H) 0.00 - 0.04 K/uL    Lymph # 1.1 1.0 - 4.8 K/uL    Mono # 1.5 (H) 0.3 - 1.0 K/uL    Eos # 0.0 0.0 - 0.5 K/uL    Baso # 0.04 0.00 - 0.20 K/uL    nRBC 0 0 /100 WBC    Gran % 78.7 (H) 38.0 - 73.0 %    Lymph % 8.2 (L) 18.0 - 48.0 %    Mono % 11.8 4.0 - 15.0 %    Eosinophil % 0.2 0.0 - 8.0 %    Basophil % 0.3 0.0 - 1.9 %    Differential Method Automated      Renal US    FINDINGS:  The right kidney measures approximately 11.1 cm in length.  There is no evidence for hydronephrosis.  Color imaging demonstrates flow to the right kidney with a resistive index measurement of 0.60.  At the lower pole of the right kidney there is a 4.1 mm hyperechoic focus that may relate to a small nonobstructing calculus.  At the upper pole of the right kidney there is a hypoechoic finding measuring approximately 11 x 6.9 by 13.4 mm with posterior wall enhancement and increased through transmission, most consistent with a small cyst.     The left kidney measures approximately 13 cm in length.  There is no evidence for hydronephrosis.  Color imaging demonstrates flow to the left kidney with a resistive index measurement of 0.72.  At the mid to upper pole of the left kidney there is an exophytic hypoechoic finding measuring approximately 3.2 x 2.6 x 2.7 cm in size, consistent with a cyst.     The urinary bladder is identified measuring approximately 5 x 7.3 x 5.4 cm  with a calculated volume of 102.42 mL.  Postvoid imaging is not available.     The prostate measures approximately 3.2 x 3 x 4.4 cm in size with a calculated volume of 22 cc.     Impression:     There is no evidence for hydronephrosis bilaterally.     Mild elevated resistive index of the left kidney measuring 0.72 can be seen with medical renal disease.     Nonobstructing calculus of the right kidney.     Renal cysts are noted.  I/O last 3 completed shifts:  In: 3591.2 [P.O.:500; I.V.:3091.2]  Out: 2675 [Urine:2675]    Vitals:    08/09/23 0505 08/09/23 0605 08/09/23 0800 08/09/23 0839   BP: (!) 105/52 (!) 107/54     Pulse: 73 78  69   Resp:  12  18   Temp:   98.3 °F (36.8 °C)    TempSrc:       SpO2:    97%   Weight:       Height:           No Jvd, Thyromegaly or Lymphadenopathy  Lungs: Fairly clear anteriorly and laterally  Cor: RRR no G or rubs  Abd: Soft benign good bowel sounds non tender  Ext: No E C C    A)    LAM cause likely related to decreased PO intake, meds, maybe infection (has elevated wbc) aleve FENA >1  Good uo   Hx of hrn   Hyper uricemia   Hx of drug use   Renal US suggests CKD  Asx K stone  Active Smoker   Active etoh user       P)    Renal Diet  Home meds  Protect access  HD not needed right now  Binders prn  Adjust all meds to the degree of renal fx  Close follow up I/O and weights  Maintain Hydration   No need for K bx  Recheck bmp

## 2023-08-10 ENCOUNTER — ANESTHESIA (OUTPATIENT)
Dept: CARDIOLOGY | Facility: HOSPITAL | Age: 63
DRG: 872 | End: 2023-08-10
Payer: MEDICAID

## 2023-08-10 ENCOUNTER — ANESTHESIA EVENT (OUTPATIENT)
Dept: CARDIOLOGY | Facility: HOSPITAL | Age: 63
DRG: 872 | End: 2023-08-10
Payer: MEDICAID

## 2023-08-10 LAB
ALBUMIN SERPL BCP-MCNC: 1.9 G/DL (ref 3.5–5.2)
ANION GAP SERPL CALC-SCNC: 10 MMOL/L (ref 8–16)
ANION GAP SERPL CALC-SCNC: 11 MMOL/L (ref 8–16)
BUN SERPL-MCNC: 56 MG/DL (ref 8–23)
BUN SERPL-MCNC: 63 MG/DL (ref 8–23)
CALCIUM SERPL-MCNC: 8.5 MG/DL (ref 8.7–10.5)
CALCIUM SERPL-MCNC: 8.5 MG/DL (ref 8.7–10.5)
CHLORIDE SERPL-SCNC: 96 MMOL/L (ref 95–110)
CHLORIDE SERPL-SCNC: 96 MMOL/L (ref 95–110)
CO2 SERPL-SCNC: 27 MMOL/L (ref 23–29)
CO2 SERPL-SCNC: 28 MMOL/L (ref 23–29)
CREAT SERPL-MCNC: 3.9 MG/DL (ref 0.5–1.4)
CREAT SERPL-MCNC: 4.6 MG/DL (ref 0.5–1.4)
EST. GFR  (NO RACE VARIABLE): 13 ML/MIN/1.73 M^2
EST. GFR  (NO RACE VARIABLE): 16 ML/MIN/1.73 M^2
GLUCOSE SERPL-MCNC: 114 MG/DL (ref 70–110)
GLUCOSE SERPL-MCNC: 121 MG/DL (ref 70–110)
MAGNESIUM SERPL-MCNC: 1.3 MG/DL (ref 1.6–2.6)
MAGNESIUM SERPL-MCNC: 1.6 MG/DL (ref 1.6–2.6)
PHOSPHATE SERPL-MCNC: 4.7 MG/DL (ref 2.7–4.5)
POTASSIUM SERPL-SCNC: 2.9 MMOL/L (ref 3.5–5.1)
POTASSIUM SERPL-SCNC: 3.5 MMOL/L (ref 3.5–5.1)
SODIUM SERPL-SCNC: 134 MMOL/L (ref 136–145)
SODIUM SERPL-SCNC: 134 MMOL/L (ref 136–145)

## 2023-08-10 PROCEDURE — 27000221 HC OXYGEN, UP TO 24 HOURS

## 2023-08-10 PROCEDURE — S4991 NICOTINE PATCH NONLEGEND: HCPCS | Performed by: INTERNAL MEDICINE

## 2023-08-10 PROCEDURE — 94640 AIRWAY INHALATION TREATMENT: CPT

## 2023-08-10 PROCEDURE — 92960 CARDIOVERSION ELECTRIC EXT: CPT | Performed by: INTERNAL MEDICINE

## 2023-08-10 PROCEDURE — D9220A PRA ANESTHESIA: ICD-10-PCS | Mod: CRNA,,, | Performed by: NURSE ANESTHETIST, CERTIFIED REGISTERED

## 2023-08-10 PROCEDURE — 63600175 PHARM REV CODE 636 W HCPCS: Performed by: INTERNAL MEDICINE

## 2023-08-10 PROCEDURE — 25000242 PHARM REV CODE 250 ALT 637 W/ HCPCS: Performed by: INTERNAL MEDICINE

## 2023-08-10 PROCEDURE — 83735 ASSAY OF MAGNESIUM: CPT | Performed by: SURGERY

## 2023-08-10 PROCEDURE — 25000003 PHARM REV CODE 250: Performed by: INTERNAL MEDICINE

## 2023-08-10 PROCEDURE — D9220A PRA ANESTHESIA: Mod: CRNA,,, | Performed by: NURSE ANESTHETIST, CERTIFIED REGISTERED

## 2023-08-10 PROCEDURE — 36415 COLL VENOUS BLD VENIPUNCTURE: CPT | Performed by: SURGERY

## 2023-08-10 PROCEDURE — 92960 CARDIOVERSION ELECTRIC EXT: CPT | Mod: ,,, | Performed by: INTERNAL MEDICINE

## 2023-08-10 PROCEDURE — 80069 RENAL FUNCTION PANEL: CPT | Performed by: SURGERY

## 2023-08-10 PROCEDURE — 36415 COLL VENOUS BLD VENIPUNCTURE: CPT | Performed by: STUDENT IN AN ORGANIZED HEALTH CARE EDUCATION/TRAINING PROGRAM

## 2023-08-10 PROCEDURE — 94761 N-INVAS EAR/PLS OXIMETRY MLT: CPT

## 2023-08-10 PROCEDURE — 25000003 PHARM REV CODE 250: Performed by: STUDENT IN AN ORGANIZED HEALTH CARE EDUCATION/TRAINING PROGRAM

## 2023-08-10 PROCEDURE — 37000008 HC ANESTHESIA 1ST 15 MINUTES: Performed by: INTERNAL MEDICINE

## 2023-08-10 PROCEDURE — 25000003 PHARM REV CODE 250: Performed by: NURSE ANESTHETIST, CERTIFIED REGISTERED

## 2023-08-10 PROCEDURE — 83735 ASSAY OF MAGNESIUM: CPT | Mod: 91 | Performed by: STUDENT IN AN ORGANIZED HEALTH CARE EDUCATION/TRAINING PROGRAM

## 2023-08-10 PROCEDURE — D9220A PRA ANESTHESIA: ICD-10-PCS | Mod: ANES,,, | Performed by: ANESTHESIOLOGY

## 2023-08-10 PROCEDURE — 80048 BASIC METABOLIC PNL TOTAL CA: CPT | Performed by: STUDENT IN AN ORGANIZED HEALTH CARE EDUCATION/TRAINING PROGRAM

## 2023-08-10 PROCEDURE — 93010 ELECTROCARDIOGRAM REPORT: CPT | Mod: 59,,, | Performed by: INTERNAL MEDICINE

## 2023-08-10 PROCEDURE — 63600175 PHARM REV CODE 636 W HCPCS: Performed by: NURSE ANESTHETIST, CERTIFIED REGISTERED

## 2023-08-10 PROCEDURE — 92960 PR CARDIOVERSION, ELECTIVE;EXTERN: ICD-10-PCS | Mod: ,,, | Performed by: INTERNAL MEDICINE

## 2023-08-10 PROCEDURE — D9220A PRA ANESTHESIA: Mod: ANES,,, | Performed by: ANESTHESIOLOGY

## 2023-08-10 PROCEDURE — 11000001 HC ACUTE MED/SURG PRIVATE ROOM

## 2023-08-10 PROCEDURE — 93005 ELECTROCARDIOGRAM TRACING: CPT

## 2023-08-10 PROCEDURE — 63600175 PHARM REV CODE 636 W HCPCS: Performed by: STUDENT IN AN ORGANIZED HEALTH CARE EDUCATION/TRAINING PROGRAM

## 2023-08-10 PROCEDURE — 94760 N-INVAS EAR/PLS OXIMETRY 1: CPT

## 2023-08-10 PROCEDURE — 93010 EKG 12-LEAD: ICD-10-PCS | Mod: 59,,, | Performed by: INTERNAL MEDICINE

## 2023-08-10 PROCEDURE — 37000009 HC ANESTHESIA EA ADD 15 MINS: Performed by: INTERNAL MEDICINE

## 2023-08-10 RX ORDER — MAGNESIUM SULFATE HEPTAHYDRATE 40 MG/ML
2 INJECTION, SOLUTION INTRAVENOUS ONCE
Status: COMPLETED | OUTPATIENT
Start: 2023-08-10 | End: 2023-08-10

## 2023-08-10 RX ORDER — PROPOFOL 10 MG/ML
VIAL (ML) INTRAVENOUS
Status: DISCONTINUED | OUTPATIENT
Start: 2023-08-10 | End: 2023-08-10

## 2023-08-10 RX ORDER — POTASSIUM CHLORIDE 750 MG/1
30 TABLET, EXTENDED RELEASE ORAL
Status: COMPLETED | OUTPATIENT
Start: 2023-08-10 | End: 2023-08-10

## 2023-08-10 RX ORDER — POTASSIUM CHLORIDE 20 MEQ/1
40 TABLET, EXTENDED RELEASE ORAL ONCE
Status: COMPLETED | OUTPATIENT
Start: 2023-08-10 | End: 2023-08-10

## 2023-08-10 RX ORDER — LIDOCAINE HYDROCHLORIDE 20 MG/ML
INJECTION INTRAVENOUS
Status: DISCONTINUED | OUTPATIENT
Start: 2023-08-10 | End: 2023-08-10

## 2023-08-10 RX ORDER — SOTALOL HYDROCHLORIDE 80 MG/1
80 TABLET ORAL 2 TIMES DAILY
Status: DISCONTINUED | OUTPATIENT
Start: 2023-08-10 | End: 2023-08-11

## 2023-08-10 RX ADMIN — DOXYCYCLINE HYCLATE 100 MG: 100 TABLET, COATED ORAL at 08:08

## 2023-08-10 RX ADMIN — CEFEPIME 1 G: 1 INJECTION, POWDER, FOR SOLUTION INTRAMUSCULAR; INTRAVENOUS at 09:08

## 2023-08-10 RX ADMIN — PROPOFOL 50 MG: 10 INJECTION, EMULSION INTRAVENOUS at 11:08

## 2023-08-10 RX ADMIN — NICOTINE 1 PATCH: 7 PATCH, EXTENDED RELEASE TRANSDERMAL at 08:08

## 2023-08-10 RX ADMIN — APIXABAN 5 MG: 5 TABLET, FILM COATED ORAL at 08:08

## 2023-08-10 RX ADMIN — SODIUM CHLORIDE, POTASSIUM CHLORIDE, SODIUM LACTATE AND CALCIUM CHLORIDE: 600; 310; 30; 20 INJECTION, SOLUTION INTRAVENOUS at 12:08

## 2023-08-10 RX ADMIN — SODIUM CHLORIDE, POTASSIUM CHLORIDE, SODIUM LACTATE AND CALCIUM CHLORIDE: 600; 310; 30; 20 INJECTION, SOLUTION INTRAVENOUS at 08:08

## 2023-08-10 RX ADMIN — ATORVASTATIN CALCIUM 40 MG: 40 TABLET, FILM COATED ORAL at 09:08

## 2023-08-10 RX ADMIN — POTASSIUM CHLORIDE 40 MEQ: 1500 TABLET, EXTENDED RELEASE ORAL at 09:08

## 2023-08-10 RX ADMIN — SOTALOL HYDROCHLORIDE 80 MG: 80 TABLET ORAL at 09:08

## 2023-08-10 RX ADMIN — FAMOTIDINE 20 MG: 20 TABLET, FILM COATED ORAL at 08:08

## 2023-08-10 RX ADMIN — MAGNESIUM SULFATE HEPTAHYDRATE 2 G: 40 INJECTION, SOLUTION INTRAVENOUS at 08:08

## 2023-08-10 RX ADMIN — IPRATROPIUM BROMIDE AND ALBUTEROL SULFATE 3 ML: 2.5; .5 SOLUTION RESPIRATORY (INHALATION) at 01:08

## 2023-08-10 RX ADMIN — IPRATROPIUM BROMIDE AND ALBUTEROL SULFATE 3 ML: 2.5; .5 SOLUTION RESPIRATORY (INHALATION) at 08:08

## 2023-08-10 RX ADMIN — ASPIRIN 81 MG: 81 TABLET, COATED ORAL at 08:08

## 2023-08-10 RX ADMIN — IPRATROPIUM BROMIDE AND ALBUTEROL SULFATE 3 ML: 2.5; .5 SOLUTION RESPIRATORY (INHALATION) at 07:08

## 2023-08-10 RX ADMIN — SODIUM CHLORIDE, POTASSIUM CHLORIDE, SODIUM LACTATE AND CALCIUM CHLORIDE: 600; 310; 30; 20 INJECTION, SOLUTION INTRAVENOUS at 01:08

## 2023-08-10 RX ADMIN — MUPIROCIN: 20 OINTMENT TOPICAL at 08:08

## 2023-08-10 RX ADMIN — POTASSIUM CHLORIDE 30 MEQ: 750 TABLET, EXTENDED RELEASE ORAL at 08:08

## 2023-08-10 RX ADMIN — ALLOPURINOL 100 MG: 100 TABLET ORAL at 08:08

## 2023-08-10 RX ADMIN — POTASSIUM CHLORIDE 30 MEQ: 750 TABLET, EXTENDED RELEASE ORAL at 11:08

## 2023-08-10 RX ADMIN — SOTALOL HYDROCHLORIDE 80 MG: 80 TABLET ORAL at 11:08

## 2023-08-10 RX ADMIN — IPRATROPIUM BROMIDE AND ALBUTEROL SULFATE 3 ML: 2.5; .5 SOLUTION RESPIRATORY (INHALATION) at 12:08

## 2023-08-10 RX ADMIN — SODIUM CHLORIDE, POTASSIUM CHLORIDE, SODIUM LACTATE AND CALCIUM CHLORIDE: 600; 310; 30; 20 INJECTION, SOLUTION INTRAVENOUS at 09:08

## 2023-08-10 RX ADMIN — AMIODARONE HYDROCHLORIDE 0.5 MG/MIN: 1.8 INJECTION, SOLUTION INTRAVENOUS at 08:08

## 2023-08-10 RX ADMIN — POTASSIUM CHLORIDE 30 MEQ: 750 TABLET, EXTENDED RELEASE ORAL at 03:08

## 2023-08-10 RX ADMIN — LIDOCAINE HYDROCHLORIDE 100 MG: 20 INJECTION, SOLUTION INTRAVENOUS at 11:08

## 2023-08-10 RX ADMIN — DOXYCYCLINE HYCLATE 100 MG: 100 TABLET, COATED ORAL at 09:08

## 2023-08-10 RX ADMIN — APIXABAN 5 MG: 5 TABLET, FILM COATED ORAL at 09:08

## 2023-08-10 RX ADMIN — MAGNESIUM SULFATE HEPTAHYDRATE 2 G: 40 INJECTION, SOLUTION INTRAVENOUS at 09:08

## 2023-08-10 NOTE — EICU
eICU Physician Virtual/Remote Brief Evaluation Note      Message from bedside RN   No a.m. labs.  Patient is having arrhythmias and wants to check electrolytes  Chart reviewed, discussed with RN   On amiodarone drip   Renal profile and magnesium ordered      WANDA Maguire MD  eICU Attending  [phone]    This report has been created through the use of [employer]ice[employer] dictation software. Typographical and content errors may occur with this process. While efforts are made to detect and correct such errors, in some cases errors will persist. For this reason, wording in this document should be considered in the proper context and not strictly verbatim

## 2023-08-10 NOTE — TRANSFER OF CARE
"Anesthesia Transfer of Care Note    Patient: Vivek Whitman    Procedure(s) Performed: Procedure(s) (LRB):  Cardioversion or Defibrillation (N/A)  Cardioversion (N/A)    Patient location: Other: OR 5 Cath Lab Staff    Anesthesia Type: MAC    Transport from OR: Transported from OR on room air with adequate spontaneous ventilation    Post pain: adequate analgesia    Post assessment: no apparent anesthetic complications and tolerated procedure well    Post vital signs: stable    Level of consciousness: awake, alert and oriented    Nausea/Vomiting: no nausea/vomiting    Complications: none    Transfer of care protocol was followed      Last vitals:   Visit Vitals  /74   Pulse (!) 56   Temp 36.1 °C (97 °F) (Skin)   Resp 18   Ht 5' 8" (1.727 m)   Wt 93 kg (205 lb)   SpO2 96%   BMI 31.17 kg/m²     "

## 2023-08-10 NOTE — NURSING
[facility] [facility] [place] 4 [place]      8/10/2023       Skin assessed on: Q Shift      [x] No Pressure Injuries Present    [x]Prevention Measures Documented    [] Yes LDA  for Pressure Injury Previously documented     [] Yes New Pressure Injury Discovered   [] LDA for New Pressure Injury Added      Attending RN:  Serina Webster RN     Second RN:  Fanny

## 2023-08-10 NOTE — NURSING
[facility], [facility]  Nurses Note -- 4 Eyes      8/10/2023       Skin assessed on: Q Shift      [x] No Pressure Injuries Present    [x]Prevention Measures Documented    [] Yes LDA  for Pressure Injury Previously documented     [] Yes New Pressure Injury Discovered   [] LDA for New Pressure Injury Added      Attending RN:  Fanny Juarez RN     Second RN:  Dennis Garza RN

## 2023-08-10 NOTE — ANESTHESIA PREPROCEDURE EVALUATION
08/10/2023  Vivek Whitman is a 62 y.o., male.  To undergo Procedure(s) (LRB):  Cardioversion or Defibrillation (N/A)  Cardioversion (N/A)       Past Medical History:  Past Medical History:   Diagnosis Date    Hypertension        Past Surgical History:  Past Surgical History:   Procedure Laterality Date    CARDIOVERSION N/A 8/8/2023    Procedure: Cardioversion;  Surgeon: Dank Claudio MD;  Location: [facility] CATH LAB;  Service: Cardiology;  Laterality: N/A;    HERNIA REPAIR      TRANSESOPHAGEAL ECHOCARDIOGRAM WITH POSSIBLE CARDIOVERSION (KASHIF W/ POSS CARDIOVERSION) N/A 8/8/2023    Procedure: Transesophageal echo (KASHIF) intra-procedure log documentation;  Surgeon: Dank Claudio MD;  Location: [facility] CATH LAB;  Service: Cardiology;  Laterality: N/A;    TRANSESOPHAGEAL ECHOCARDIOGRAPHY N/A 8/8/2023    Procedure: ECHOCARDIOGRAM, TRANSESOPHAGEAL;  Surgeon: Dank Claudio MD;  Location: [facility] CATH LAB;  Service: Cardiology;  Laterality: N/A;       Social History:  Social History     Socioeconomic History    Marital status: Single   Tobacco Use    Smoking status: Every Day     Current packs/day: 1.00     Types: Cigarettes   Substance and Sexual Activity    Alcohol use: Yes     Alcohol/week: 12.0 standard drinks of alcohol     Types: 12 Cans of beer per week    Drug use: No       Medications:  No current facility-administered medications on file prior to encounter.     Current Outpatient Medications on File Prior to Encounter   Medication Sig Dispense Refill    amlodipine-olmesartan (CHERI) 5-40 mg per tablet Take 1 tablet by mouth once daily.      aspirin 81 MG Chew Take 1 tablet (81 mg total) by mouth once daily. 30 tablet 1    carvediloL (COREG) 25 MG tablet Take 25 mg by mouth 2 (two) times daily.      chlorthalidone (HYGROTEN) 25 MG Tab Take 25 mg by mouth once daily.      rosuvastatin (CRESTOR) 20 MG  tablet Take 20 mg by mouth once daily.      naloxone (NARCAN) 1 mg/mL injection 2 mg (1 mg per nostril) by Nasal route as needed for opioid overdose; may repeat in 3 to 5 minutes if not effective. Call 911 (Patient not taking: Reported on 8/7/2023) 2 mL 1    nicotine (NICODERM CQ) 7 mg/24 hr Place 1 patch onto the skin once daily. 28 patch 1       Allergies:  Review of patient's allergies indicates:  No Known Allergies    Active Problems:  Patient Active Problem List   Diagnosis    Heroin abuse    Accidental drug overdose    Dizziness    Hypertensive urgency    Tobacco abuse    Alcohol abuse    History of stroke    Dehydration    ARF (acute renal failure)    Hypotension    Leukocytosis    Class 1 obesity with body mass index (BMI) of 31.0 to 31.9 in adult    A-fib    Sepsis       Diagnostic Studies:   Latest Reference Range & Units 08/09/23 06:01   WBC 3.90 - 12.70 K/uL 13.06 (H)   RBC 4.60 - 6.20 M/uL 4.09 (L)   Hemoglobin 14.0 - 18.0 g/dL 12.3 (L)   Hematocrit 40.0 - 54.0 % 35.8 (L)   MCV 82 - 98 fL 88   MCH 27.0 - 31.0 pg 30.1   MCHC 32.0 - 36.0 g/dL 34.4   RDW 11.5 - 14.5 % 14.0   Platelets 150 - 450 K/uL 180      Latest Reference Range & Units 08/10/23 06:03   Sodium 136 - 145 mmol/L 134 (L)   Potassium 3.5 - 5.1 mmol/L 2.9 (L)   Chloride 95 - 110 mmol/L 96   CO2 23 - 29 mmol/L 27   Anion Gap 8 - 16 mmol/L 11   BUN 8 - 23 mg/dL 63 (H)   Creatinine 0.5 - 1.4 mg/dL 4.6 (H)   eGFR >60 mL/min/1.73 m^2 13 !     EKG (8/7/23):  Atrial fibrillation with rapid ventricular response   Low voltage QRS     TTE (8/7/23):    Left Ventricle: The left ventricle is normal in size. Mildly increased wall thickness. Normal wall motion. There is normal systolic function with a visually estimated ejection fraction of 65 - 70%. Grade I diastolic dysfunction.    Left Atrium: Left atrium is severely dilated.    Right Ventricle: Normal right ventricular cavity size. Systolic function is normal.    Right Atrium: Right  atrium is moderately dilated.    Aortic Valve: There is aortic valve sclerosis.    Tricuspid Valve: There is mild regurgitation.    Pulmonary Artery: The estimated pulmonary artery systolic pressure is 23 mmHg.    IVC/SVC: Normal venous pressure at 3 mmHg.    24 Hour Vitals:  Temp:  [36.7 °C (98.1 °F)-36.9 °C (98.5 °F)] 36.9 °C (98.4 °F)  Pulse:  [] 108  Resp:  [17-34] 19  SpO2:  [91 %-99 %] 92 %  BP: ()/(48-86) 118/72   See Nursing Charting For Additional Vitals      Pre-op Assessment    I have reviewed the Patient Summary Reports.     I have reviewed the Nursing Notes.    I have reviewed the Medications.     Review of Systems  Anesthesia Hx:  No problems with previous Anesthesia  Denies Family Hx of Anesthesia complications.   Denies Personal Hx of Anesthesia complications.   Social:  Smoker, Alcohol Use    Hematology/Oncology:         -- Anemia:   Cardiovascular:   Hypertension Dysrhythmias atrial fibrillation ECG has been reviewed.    Pulmonary:  Pulmonary Normal    Renal/:   Chronic Renal Disease    Hepatic/GI:  Hepatic/GI Normal    Musculoskeletal:  Musculoskeletal Normal    Neurological:   CVA    Endocrine:  Obesity / BMI > 30  Dermatological:  Skin Normal        Physical Exam  General: Well nourished, Cooperative and Alert    Airway:  Mouth Opening: Normal  TM Distance: Normal  Tongue: Normal  Neck ROM: Normal ROM    Dental:  Intact    Chest/Lungs:  Normal Respiratory Rate    Heart:  Rate: Normal  Rhythm: Regular Rhythm        Anesthesia Plan  Type of Anesthesia, risks & benefits discussed:    Anesthesia Type: Gen Natural Airway, MAC  Intra-op Monitoring Plan: Standard ASA Monitors  Induction:  IV  Informed Consent: Informed consent signed with the Patient and all parties understand the risks and agree with anesthesia plan.  All questions answered.   ASA Score: 2    Ready For Surgery From Anesthesia Perspective.     .

## 2023-08-10 NOTE — PROGRESS NOTES
[facility] Medicine  Progress Note    Patient Name: Vivek Whitman  MRN: [id number]  Patient Class: IP- Inpatient   Admission Date: 8/6/2023  Length of Stay: 3 days  Attending Physician: Glynn Blunt MD  Primary Care Provider: Racheal, Primary Doctor        Subjective:     Principal Problem:ARF (acute renal failure)        HPI:   62 y.o. male, with a PMHx of HTN, HLD, tobacco abuse, and hx of heroin abuse/alcohol abuse transferred from [facility] ED for further evaluation. He presented to the ED with cocerns of low blood pressure. Stated for the past week, he had low blood pressure readings with SBP 90-100s and DBP 50s. Noted hypotension associated with feeling fatigue, weak, and make him just want to sleep all day. Stated he has not eat or drink much. However, continued to take his home BP medications. Tried to hydrate himself with gatorade prior to coming to the ED. Denies any fever, chest pain, cough, shortness of breath, abdominal pain, nausea or vomiting. Admits he still smokes 1ppd, smoke for the past 40+ years.     In the ED, pt hypotensive with BP 94/53. Patient given IVF. Labs significant for leukocytosis, elevated Cr of 7.4, and elevated phosphorus. US renal with no evidence for hydronephrosis bilaterally.  CXR with no focal consolidations or edema. Patient admitted to  for further evaluation.       Overview/Hospital Course:  Patient admitted with hypotension and leukocytosis, and feeling unwell for the past couple weeks.  Patient's blood pressure again low today at 70/49, concerns he could bacteremia or another infection.  Initial presentation of AFib (and ARF) often in the setting of infection due to catecholamine release and hypotension, as well.  He is afebrile, however this does not exclude infection. Hx of IVDU makes him higher risk. Blood cultures taken.  , also concerning for an ongoing inflammatory issue/infection.  Given acute renal failure, will hold off on vancomycin for  now.  Will initiate IV cefepime and oral doxycycline.  Will await cultures to tailor antibiotics or deescalate.  8 a.m. cortisol was WNL.  Creatinine slowly improving with fluids. S/p successful DCCV, now in NSR. Improving overall. Urine picking up. S/d to  floor.     Patient went back into AFib RVR successfully cardioverted again, in NSR now.  Sotalol initiated.  Electrolytes severely depleted this morning, may have contributed, will replace IV and recheck in the afternoon.  Urine output increased, 4 L/24 H.      Interval History:  NAEON.  No new issues.   Denies complaints.  UOP 4.3L /24h  All questions answered and updates on care given.       ROS:  General: Negative for fevers or chills.  Cardiac: Negative for chest pain or orthopnea   Pulmonary: Negative for dyspnea or wheezing.  GI: Negative for abdominal distention or pain     Vitals:    08/10/23 1300 08/10/23 1325 08/10/23 1400 08/10/23 1500   BP: 124/62  133/69 135/63   BP Location:       Pulse: (!) 57 (!) 58 (!) 59 65   Resp: (!) 21 20 19 (!) 27   Temp:    97.8 °F (36.6 °C)   TempSrc:    Oral   SpO2: (!) 94% (!) 94% (!) 91% (!) 92%   Weight:       Height:              Body mass index is 31.17 kg/m².      PHYSICAL EXAM:  GENERAL APPEARANCE: alert and cooperative, and appears to be in no acute distress.  HEENT:     HEAD: NC/AT     EYES: PERRL, EOMI.  Vision is grossly intact.  NECK: Neck supple, non-tender without LAD, masses or thyromegaly.  CARDIAC: There is no peripheral edema, cyanosis or pallor.   LUNGS: Clear to auscultation and percussion without rales or wheezing  ABDOMEN: Non-distended. No guarding.  MSK: No joint erythema or tenderness.   EXTREMITIES: No significant deformity or joint abnormality. No edema.   NEUROLOGICAL: CN II-XII grossly intact.   SKIN: Skin normal color, texture and turgor with no lesions or eruptions.  PSYCHIATRIC: Oriented. No tangential speech. No Hyperactive features.        Recent Results (from the past 24 hour(s))    Renal Function Panel    Collection Time: 08/10/23  6:03 AM   Result Value Ref Range    Glucose 114 (H) 70 - 110 mg/dL    Sodium 134 (L) 136 - 145 mmol/L    Potassium 2.9 (L) 3.5 - 5.1 mmol/L    Chloride 96 95 - 110 mmol/L    CO2 27 23 - 29 mmol/L    BUN 63 (H) 8 - 23 mg/dL    Calcium 8.5 (L) 8.7 - 10.5 mg/dL    Creatinine 4.6 (H) 0.5 - 1.4 mg/dL    Albumin 1.9 (L) 3.5 - 5.2 g/dL    Phosphorus 4.7 (H) 2.7 - 4.5 mg/dL    eGFR 13 (A) >60 mL/min/1.73 m^2    Anion Gap 11 8 - 16 mmol/L   Magnesium    Collection Time: 08/10/23  6:03 AM   Result Value Ref Range    Magnesium 1.3 (L) 1.6 - 2.6 mg/dL       Microbiology Results (last 7 days)       Procedure Component Value Units Date/Time    Blood culture [[id number]] Collected: 08/08/23 0819    Order Status: Completed Specimen: Blood Updated: 08/10/23 0903     Blood Culture, Routine No Growth to date      No Growth to date      No Growth to date    Blood culture [[id number]] Collected: 08/08/23 0807    Order Status: Completed Specimen: Blood from Peripheral, Right Hand Updated: 08/10/23 0903     Blood Culture, Routine No Growth to date      No Growth to date      No Growth to date             Imaging Results              US Retroperitoneal Complete (Final result)  Result time 08/07/23 03:33:50      Final result by Marquise Humphries MD (08/07/23 03:33:50)                   Impression:      There is no evidence for hydronephrosis bilaterally.    Mild elevated resistive index of the left kidney measuring 0.72 can be seen with medical renal disease.    Nonobstructing calculus of the right kidney.    Renal cysts are noted.      Electronically signed by: Marquise Humphries  Date:    08/07/2023  Time:    03:33               Narrative:    EXAMINATION:  US RETROPERITONEAL COMPLETE    CLINICAL HISTORY:  acute renal failure;    TECHNIQUE:  Ultrasound of the kidneys and urinary bladder was performed including color flow and Doppler evaluation of the  kidneys.    COMPARISON:  None.    FINDINGS:  The right kidney measures approximately 11.1 cm in length.  There is no evidence for hydronephrosis.  Color imaging demonstrates flow to the right kidney with a resistive index measurement of 0.60.  At the lower pole of the right kidney there is a 4.1 mm hyperechoic focus that may relate to a small nonobstructing calculus.  At the upper pole of the right kidney there is a hypoechoic finding measuring approximately 11 x 6.9 by 13.4 mm with posterior wall enhancement and increased through transmission, most consistent with a small cyst.    The left kidney measures approximately 13 cm in length.  There is no evidence for hydronephrosis.  Color imaging demonstrates flow to the left kidney with a resistive index measurement of 0.72.  At the mid to upper pole of the left kidney there is an exophytic hypoechoic finding measuring approximately 3.2 x 2.6 x 2.7 cm in size, consistent with a cyst.    The urinary bladder is identified measuring approximately 5 x 7.3 x 5.4 cm with a calculated volume of 102.42 mL.  Postvoid imaging is not available.    The prostate measures approximately 3.2 x 3 x 4.4 cm in size with a calculated volume of 22 cc.                                       X-Ray Chest AP Portable (Final result)  Result time 08/07/23 00:42:12      Final result by Marquise Humphries MD (08/07/23 00:42:12)                   Impression:      There is no radiographic evidence for acute intrathoracic process.      Electronically signed by: Marquise Humphries  Date:    08/07/2023  Time:    00:42               Narrative:    EXAMINATION:  XR CHEST AP PORTABLE    CLINICAL HISTORY:  Hypotension, unspecified    TECHNIQUE:  Single frontal view of the chest was performed.    COMPARISON:  Chest radiograph March 13, 2021    FINDINGS:  AP portable chest radiographic examination is submitted.  There is mild diminished depth of inspiration.  When accounting for position and technique and depth  of inspiration, the appearance of the cardiomediastinal silhouette is stable.  Aortic atherosclerotic change noted.    There is no evidence for confluent infiltrate or consolidation, significant pleural effusion or pneumothorax.    The osseous structures demonstrate chronic change.                                             Assessment/Plan:      * ARF (acute renal failure)  Patient with acute kidney injury/acute renal failure likely due to pre-renal azotemia due to dehydration LAM is currently worsening. Baseline creatinine unknown - Labs reviewed- Renal function/electrolytes with CrCl cannot be calculated (Unknown ideal weight.). according to latest data. Monitor urine output and serial BMP and adjust therapy as needed. Avoid nephrotoxins and renally dose meds for GFR listed above.    -Presented with LAM  -Cr on admit 7.4  -Baseline Cr unknown, but Cr 1.1 in 08/2022  -Suspect due to decreased PO intake and home meds (amolidpine-olmarsartan and chlorthalidone)  -Renal US with no obstruction. Findings consistent with CKD  -IVF  -Nephrology consulted: check FENA, pthi, and uric acid  -Avoid nephrotoxins, renal dose all meds.   -Monitor Is/Os  -Monitor       Sepsis  This patient does not have evidence of infective focus  My overall impression is sepsis.  Source: Unknown  Antibiotics given-   Antibiotics (72h ago, onward)      Start     Stop Route Frequency Ordered    08/08/23 1015  ceFEPIme (MAXIPIME) 1 g in dextrose 5 % in water (D5W) 100 mL IVPB (MB+)         -- IV Every 12 hours (non-standard times) 08/08/23 0901    08/08/23 1015  doxycycline tablet 100 mg         -- Oral Every 12 hours 08/08/23 0901    08/08/23 0900  mupirocin 2 % ointment         08/13/23 0859 Nasl 2 times daily 08/08/23 0725          Latest lactate reviewed-  Recent Labs   Lab 08/08/23  0807   LACTATE 0.8     Organ dysfunction indicated by Acute kidney injury and New afib    Fluid challenge Actual Body weight- Patient will receive 30ml/kg actual  body weight to calculate fluid bolus for treatment of septic shock.     Post- resuscitation assessment Yes Perfusion exam was performed within 6 hours of septic shock presentation after bolus shows Adequate tissue perfusion assessed by non-invasive monitoring       Will ordered but not started, responding to fluids Pressors- Levophed for MAP of 65  Source control achieved by: Abx and fluids    Patient admitted with hypotension and leukocytosis, and feeling unwell for the past couple weeks.  Patient's blood pressure again low today at 70/49, concerns he could bacteremia or another infection.  No skin infections appreciated. Urine clean. No consolidation on CXR.  Initial presentation of AFib (and ARF) often in the setting of infection due to catecholamine release and hypotension, as well.    He is afebrile, however this does not exclude infection.    Blood cultures taken.  , also concerning for an ongoing inflammatory issue/infection.    Given acute renal failure, will hold off on vancomycin for now.    Will initiate IV cefepime and oral doxycycline.   Will await cultures to tailor antibiotics or deescalate.      Class 1 obesity with body mass index (BMI) of 31.0 to 31.9 in adult  Body mass index is 31.17 kg/m². Morbid obesity complicates all aspects of disease management from diagnostic modalities to treatment. Weight loss encouraged and health benefits explained to patient.         Leukocytosis  -WBC# 16.07 on admission w/hypotension. Pt afebrile. CXR with no acute process.   -Pt with leukocytosis, suspect reactive. Low suspicion for infection at this time.   -Hypotension improved with fluids  -Continue IVF  -Echo ordered  -Will monitor off abx at this time   -repeat labs in the AM     Hypotension  -BP as low as 63/41 in the ED  -Suspect due to pt continuing home meds (amolidpine-olmarsartan and chlorthalidone) in setting of LAM and decreased PO intake/dehydration  -Hypotension improved with fluids  -Continue  IVF  -Pt with leukocytosis, suspect reactive. Low suspicion for infection at this time.   -Echo ordered  -Hold home BP meds as above  -Monitor     Dehydration  -Continue IVF as above  -See plan for LAM    Tobacco abuse  - Patient was counseled regarding smoking for 3-10 minutes.  - Encourage smoking cessation daily  - NRT offered         Heroin abuse  Negative on admission, but since IVDU hx higher risk for bacteremia        VTE Risk Mitigation (From admission, onward)           Ordered     apixaban tablet 5 mg  2 times daily         08/08/23 1634     IP VTE LOW RISK PATIENT  Once         08/07/23 0211     Place sequential compression device  Until discontinued         08/07/23 0211                    Discharge Planning   ELODIA:      Code Status: Full Code   Is the patient medically ready for discharge?:     Reason for patient still in hospital (select all that apply): Patient trending condition and Treatment  Discharge Plan A: Home with family            Critical care time spent on the evaluation and treatment of severe organ dysfunction, review of pertinent labs and imaging studies, discussions with consulting providers and discussions with patient/family: 35 minutes.      Glynn Blunt MD  Department of Hospital Medicine   [facility] - Intensive Care

## 2023-08-10 NOTE — PLAN OF CARE
Problem: Adult Inpatient Plan of Care  Goal: Readiness for Transition of Care  Outcome: Ongoing, Progressing     Problem: Fatigue  Goal: Improved Activity Tolerance  Outcome: Ongoing, Progressing     Problem: Fluid and Electrolyte Imbalance (Acute Kidney Injury/Impairment)  Goal: Fluid and Electrolyte Balance  Outcome: Ongoing, Progressing

## 2023-08-10 NOTE — PLAN OF CARE
Problem: Adult Inpatient Plan of Care  Goal: Plan of Care Review  Outcome: Ongoing, Progressing  Goal: Patient-Specific Goal (Individualized)  Outcome: Ongoing, Progressing  Goal: Absence of Hospital-Acquired Illness or Injury  Outcome: Ongoing, Progressing  Goal: Optimal Comfort and Wellbeing  Outcome: Ongoing, Progressing  Goal: Readiness for Transition of Care  Outcome: Ongoing, Progressing     Problem: Fatigue  Goal: Improved Activity Tolerance  Outcome: Ongoing, Progressing     Problem: Fluid and Electrolyte Imbalance (Acute Kidney Injury/Impairment)  Goal: Fluid and Electrolyte Balance  Outcome: Ongoing, Progressing     Problem: Oral Intake Inadequate (Acute Kidney Injury/Impairment)  Goal: Optimal Nutrition Intake  Outcome: Ongoing, Progressing     Problem: Renal Function Impairment (Acute Kidney Injury/Impairment)  Goal: Effective Renal Function  Outcome: Ongoing, Progressing     Problem: Fall Injury Risk  Goal: Absence of Fall and Fall-Related Injury  Outcome: Ongoing, Progressing     Problem: Adjustment to Illness (Sepsis/Septic Shock)  Goal: Optimal Coping  Outcome: Ongoing, Progressing     Problem: Bleeding (Sepsis/Septic Shock)  Goal: Absence of Bleeding  Outcome: Ongoing, Progressing     Problem: Glycemic Control Impaired (Sepsis/Septic Shock)  Goal: Blood Glucose Level Within Desired Range  Outcome: Ongoing, Progressing     Problem: Infection Progression (Sepsis/Septic Shock)  Goal: Absence of Infection Signs and Symptoms  Outcome: Ongoing, Progressing     Problem: Nutrition Impaired (Sepsis/Septic Shock)  Goal: Optimal Nutrition Intake  Outcome: Ongoing, Progressing

## 2023-08-10 NOTE — EICU
eICU Physician Virtual/Remote Brief Evaluation Note      Message from bedside RN   Requesting order to cancel transfer to floor   Amiodarone restarted   Chart reviewed   Transfer canceled      WANDA Maguire MD  [facility] Attending  [phone]    This report has been created through the use of [employer] dictation software. Typographical and content errors may occur with this process. While efforts are made to detect and correct such errors, in some cases errors will persist. For this reason, wording in this document should be considered in the proper context and not strictly verbatim

## 2023-08-10 NOTE — SUBJECTIVE & OBJECTIVE
Interval History: back in nsr s/p cv    Review of Systems   Constitutional: Positive for malaise/fatigue.   HENT: Negative.     Eyes: Negative.    Endocrine: Negative.    Hematologic/Lymphatic: Negative.    Skin: Negative.    Musculoskeletal: Negative.    Gastrointestinal: Negative.    Genitourinary: Negative.    Neurological: Negative.    Psychiatric/Behavioral: Negative.     Allergic/Immunologic: Negative.      Objective:     Vital Signs (Most Recent):  Temp: 98.5 °F (36.9 °C) (08/09/23 2000)  Pulse: 75 (08/09/23 2021)  Resp: (!) 22 (08/09/23 2021)  BP: (!) 115/57 (08/09/23 2000)  SpO2: 95 % (08/09/23 2021) Vital Signs (24h Range):  Temp:  [97.9 °F (36.6 °C)-98.5 °F (36.9 °C)] 98.5 °F (36.9 °C)  Pulse:  [65-78] 75  Resp:  [0-28] 22  SpO2:  [91 %-97 %] 95 %  BP: (102-165)/(51-86) 115/57     Weight: 93 kg (205 lb)  Body mass index is 31.17 kg/m².     SpO2: 95 %         Intake/Output Summary (Last 24 hours) at 8/9/2023 2253  Last data filed at 8/9/2023 2000  Gross per 24 hour   Intake 2288.88 ml   Output 2900 ml   Net -611.12 ml       Lines/Drains/Airways       Peripheral Intravenous Line  Duration                  Peripheral IV - Single Lumen 08/06/23 2150 18 G Right Antecubital 3 days                       Physical Exam  Vitals reviewed.   Constitutional:       Appearance: He is well-developed.   HENT:      Head: Normocephalic.   Eyes:      Conjunctiva/sclera: Conjunctivae normal.      Pupils: Pupils are equal, round, and reactive to light.   Cardiovascular:      Rate and Rhythm: Normal rate and regular rhythm.      Heart sounds: Normal heart sounds.   Pulmonary:      Effort: Pulmonary effort is normal.      Breath sounds: Normal breath sounds.   Abdominal:      General: Bowel sounds are normal.      Palpations: Abdomen is soft.   Musculoskeletal:      Cervical back: Normal range of motion and neck supple.   Skin:     General: Skin is warm.   Neurological:      Mental Status: He is alert and oriented to person,  "place, and time.            Significant Labs: BMP:   Recent Labs   Lab 08/08/23  0522 08/09/23  0601    107   * 134*   K 3.4* 3.6   CL 94* 96   CO2 23 22*   BUN 84* 80*   CREATININE 6.9* 6.1*   CALCIUM 8.7 8.5*   MG 1.9 1.6   , CMP   Recent Labs   Lab 08/08/23  0522 08/09/23  0601   * 134*   K 3.4* 3.6   CL 94* 96   CO2 23 22*    107   BUN 84* 80*   CREATININE 6.9* 6.1*   CALCIUM 8.7 8.5*   ANIONGAP 15 16   , CBC   Recent Labs   Lab 08/08/23  0522 08/09/23  0601   WBC 13.16*  13.16* 13.06*   HGB 10.8*  10.8* 12.3*   HCT 31.6*  31.6* 35.8*     169 180   , Lipid Panel No results for input(s): "CHOL", "HDL", "LDLCALC", "TRIG", "CHOLHDL" in the last 48 hours., Troponin No results for input(s): "TROPONINI" in the last 48 hours., and All pertinent lab results from the last 24 hours have been reviewed.    Significant Imaging: Echocardiogram: Transthoracic echo (TTE) complete (Cupid Only):   Results for orders placed or performed during the hospital encounter of 08/06/23   Echo   Result Value Ref Range    LVOT stroke volume 93.88 cm3    LVIDd 4.57 3.5 - 6.0 cm    LV Systolic Volume 33.45 mL    LVIDs 2.95 2.1 - 4.0 cm    LV Diastolic Volume 95.78 mL    IVS 1.29 (A) 0.6 - 1.1 cm    LVOT diameter 2.01 cm    LVOT area 3.2 cm2    FS 35 28 - 44 %    Left Ventricle Relative Wall Thickness 0.56 cm    Posterior Wall 1.28 (A) 0.6 - 1.1 cm    LV mass 224.03 g    MV Peak E Gonzalez 0.79 m/s    TDI LATERAL 0.07 m/s    TDI SEPTAL 0.05 m/s    E/E' ratio 13.17 m/s    MV Peak A Gonzalez 0.79 m/s    TR Max Gonzalez 2.24 m/s    E/A ratio 1.00     IVRT 140.82 msec    E wave deceleration time 207.32 msec    LV SEPTAL E/E' RATIO 15.80 m/s    LV LATERAL E/E' RATIO 11.29 m/s    LVOT peak gonzalez 1.31 m/s    Left Ventricular Outflow Tract Mean Velocity 0.82 cm/s    Left Ventricular Outflow Tract Mean Gradient 3.37 mmHg    LA size 4.11 cm    Left Atrium Major Axis 5.80 cm    Left Atrium Minor Axis 5.83 cm    RV S' 16.17 cm/s    " TAPSE 2.14 cm    RA Major Axis 6.64 cm    AV mean gradient 9 mmHg    AV peak gradient 17 mmHg    Ao peak ginny 2.06 m/s    Ao VTI 37.70 cm    LVOT peak VTI 29.60 cm    AV valve area 2.49 cm²    AV Velocity Ratio 0.64     AV index (prosthetic) 0.79     DELMER by Velocity Ratio 2.02 cm²    MV mean gradient 1 mmHg    MV peak gradient 4 mmHg    MV stenosis pressure 1/2 time 60.12 ms    MV valve area p 1/2 method 3.66 cm2    MV valve area by continuity eq 3.48 cm2    MV VTI 27.0 cm    Triscuspid Valve Regurgitation Peak Gradient 20 mmHg    PV PEAK VELOCITY 1.07 m/s    PV peak gradient 5 mmHg    Sinus 3.55 cm    STJ 1.93 cm    Ascending aorta 2.73 cm    IVC diameter 1.74 cm    Mean e' 0.06 m/s    RVDD 4.55 cm    LA volume 97.51 cm3    LA WIDTH 4.8 cm    RA Width 4.2 cm    TV resting pulmonary artery pressure 23 mmHg    RV TB RVSP 5 mmHg    Est. RA pres 3 mmHg    Narrative      Left Ventricle: The left ventricle is normal in size. Mildly increased   wall thickness. Normal wall motion. There is normal systolic function with   a visually estimated ejection fraction of 65 - 70%. Grade I diastolic   dysfunction.    Left Atrium: Left atrium is severely dilated.    Right Ventricle: Normal right ventricular cavity size. Systolic   function is normal.    Right Atrium: Right atrium is moderately dilated.    Aortic Valve: There is aortic valve sclerosis.    Tricuspid Valve: There is mild regurgitation.    Pulmonary Artery: The estimated pulmonary artery systolic pressure is   23 mmHg.    IVC/SVC: Normal venous pressure at 3 mmHg.

## 2023-08-10 NOTE — PROGRESS NOTES
Awake alert oriented nad    Feeling better after cardioversion     Denies CNS ENT CP GI  RHEUM OR DERM SX  Past Medical History:   Diagnosis Date    Hypertension      Past Surgical History:   Procedure Laterality Date    CARDIOVERSION N/A 8/8/2023    Procedure: Cardioversion;  Surgeon: Dank Claudio MD;  Location: [facility] CATH LAB;  Service: Cardiology;  Laterality: N/A;    HERNIA REPAIR      TRANSESOPHAGEAL ECHOCARDIOGRAM WITH POSSIBLE CARDIOVERSION (KASHIF W/ POSS CARDIOVERSION) N/A 8/8/2023    Procedure: Transesophageal echo (KASHIF) intra-procedure log documentation;  Surgeon: Dank Claudio MD;  Location: [facility] CATH LAB;  Service: Cardiology;  Laterality: N/A;    TRANSESOPHAGEAL ECHOCARDIOGRAPHY N/A 8/8/2023    Procedure: ECHOCARDIOGRAM, TRANSESOPHAGEAL;  Surgeon: Dank Claudio MD;  Location: [facility] CATH LAB;  Service: Cardiology;  Laterality: N/A;     Social History     Socioeconomic History    Marital status: Single   Tobacco Use    Smoking status: Every Day     Current packs/day: 1.00     Types: Cigarettes   Substance and Sexual Activity    Alcohol use: Yes     Alcohol/week: 12.0 standard drinks of alcohol     Types: 12 Cans of beer per week    Drug use: No     History reviewed. No pertinent family history.,  Review of patient's allergies indicates:  No Known Allergies    Current Facility-Administered Medications   Medication    albuterol-ipratropium 2.5 mg-0.5 mg/3 mL nebulizer solution 3 mL    allopurinoL tablet 100 mg    aluminum-magnesium hydroxide-simethicone 200-200-20 mg/5 mL suspension 30 mL    amiodarone 360 mg/200 mL (1.8 mg/mL) infusion    apixaban tablet 5 mg    aspirin EC tablet 81 mg    atorvastatin tablet 40 mg    ceFEPIme (MAXIPIME) 1 g in dextrose 5 % in water (D5W) 100 mL IVPB (MB+)    dextrose 50% injection 12.5 g    dextrose 50% injection 25 g    doxycycline tablet 100 mg    famotidine tablet 20 mg    glucagon (human recombinant) injection 1 mg    glucose chewable tablet 16 g    glucose  chewable tablet 24 g    lactated ringers infusion    metoprolol injection 5 mg    mupirocin 2 % ointment    naloxone 0.4 mg/mL injection 0.02 mg    nicotine 7 mg/24 hr 1 patch    ondansetron injection 4 mg    potassium chloride SA CR tablet 30 mEq    sodium chloride 0.9% flush 10 mL    sotaloL tablet 80 mg       LABS    Recent Results (from the past 24 hour(s))   Renal Function Panel    Collection Time: 08/10/23  6:03 AM   Result Value Ref Range    Glucose 114 (H) 70 - 110 mg/dL    Sodium 134 (L) 136 - 145 mmol/L    Potassium 2.9 (L) 3.5 - 5.1 mmol/L    Chloride 96 95 - 110 mmol/L    CO2 27 23 - 29 mmol/L    BUN 63 (H) 8 - 23 mg/dL    Calcium 8.5 (L) 8.7 - 10.5 mg/dL    Creatinine 4.6 (H) 0.5 - 1.4 mg/dL    Albumin 1.9 (L) 3.5 - 5.2 g/dL    Phosphorus 4.7 (H) 2.7 - 4.5 mg/dL    eGFR 13 (A) >60 mL/min/1.73 m^2    Anion Gap 11 8 - 16 mmol/L   Magnesium    Collection Time: 08/10/23  6:03 AM   Result Value Ref Range    Magnesium 1.3 (L) 1.6 - 2.6 mg/dL     Renal US    FINDINGS:  The right kidney measures approximately 11.1 cm in length.  There is no evidence for hydronephrosis.  Color imaging demonstrates flow to the right kidney with a resistive index measurement of 0.60.  At the lower pole of the right kidney there is a 4.1 mm hyperechoic focus that may relate to a small nonobstructing calculus.  At the upper pole of the right kidney there is a hypoechoic finding measuring approximately 11 x 6.9 by 13.4 mm with posterior wall enhancement and increased through transmission, most consistent with a small cyst.     The left kidney measures approximately 13 cm in length.  There is no evidence for hydronephrosis.  Color imaging demonstrates flow to the left kidney with a resistive index measurement of 0.72.  At the mid to upper pole of the left kidney there is an exophytic hypoechoic finding measuring approximately 3.2 x 2.6 x 2.7 cm in size, consistent with a cyst.     The urinary bladder is identified measuring  approximately 5 x 7.3 x 5.4 cm with a calculated volume of 102.42 mL.  Postvoid imaging is not available.     The prostate measures approximately 3.2 x 3 x 4.4 cm in size with a calculated volume of 22 cc.     Impression:     There is no evidence for hydronephrosis bilaterally.     Mild elevated resistive index of the left kidney measuring 0.72 can be seen with medical renal disease.     Nonobstructing calculus of the right kidney.     Renal cysts are noted.  I/O last 3 completed shifts:  In: 5480.6 [I.V.:5086.1; IV Piggyback:394.5]  Out: 5050 [Urine:5050]    Vitals:    08/10/23 0755 08/10/23 0800 08/10/23 0900 08/10/23 1000   BP:  133/85 134/67 118/72   Pulse: (!) 121 106 109 108   Resp: 18 (!) 22 17 19   Temp:       TempSrc:       SpO2: (!) 93% 98% (!) 93% (!) 92%   Weight:       Height:           No Jvd, Thyromegaly or Lymphadenopathy  Lungs: Fairly clear anteriorly and laterally  Cor: RRR no G or rubs  Abd: Soft benign good bowel sounds non tender  Ext: No E C C    A)    LAM cause likely related to decreased PO intake, meds, maybe infection (has elevated wbc) aleve FENA >1  Creat down Great uo   Hx of hrn   Hyper uricemia   Hx of drug use   Renal US suggests CKD  Asx K stone  Active Smoker   Active etoh user   Hypomag hypoK  Hypoalb mild proteinuria       P)    Renal Diet  Home meds  Protect access  HD not needed right now  Binders prn  Adjust all meds to the degree of renal fx  Close follow up I/O and weights  Maintain Hydration   No need for K bx  Recheck bmp mag and k after replenishing     Out pt consider SANDI

## 2023-08-10 NOTE — PROGRESS NOTES
[facility] Medicine  Progress Note    Patient Name: Vivek Whitman  MRN: [id number]  Patient Class: IP- Inpatient   Admission Date: 8/6/2023  Length of Stay: 3 days  Attending Physician: Glynn Blnut MD  Primary Care Provider: Racheal, Primary Doctor        Subjective:     Principal Problem:ARF (acute renal failure)        HPI:   62 y.o. male, with a PMHx of HTN, HLD, tobacco abuse, and hx of heroin abuse/alcohol abuse transferred from [facility] ED for further evaluation. He presented to the ED with cocerns of low blood pressure. Stated for the past week, he had low blood pressure readings with SBP 90-100s and DBP 50s. Noted hypotension associated with feeling fatigue, weak, and make him just want to sleep all day. Stated he has not eat or drink much. However, continued to take his home BP medications. Tried to hydrate himself with gatorade prior to coming to the ED. Denies any fever, chest pain, cough, shortness of breath, abdominal pain, nausea or vomiting. Admits he still smokes 1ppd, smoke for the past 40+ years.     In the ED, pt hypotensive with BP 94/53. Patient given IVF. Labs significant for leukocytosis, elevated Cr of 7.4, and elevated phosphorus. US renal with no evidence for hydronephrosis bilaterally.  CXR with no focal consolidations or edema. Patient admitted to  for further evaluation.       Overview/Hospital Course:  Patient admitted with hypotension and leukocytosis, and feeling unwell for the past couple weeks.  Patient's blood pressure again low today at 70/49, concerns he could bacteremia or another infection.  Initial presentation of AFib (and ARF) often in the setting of infection due to catecholamine release and hypotension, as well.  He is afebrile, however this does not exclude infection. Hx of IVDU makes him higher risk. Blood cultures taken.  , also concerning for an ongoing inflammatory issue/infection.  Given acute renal failure, will hold off on vancomycin for  now.  Will initiate IV cefepime and oral doxycycline.  Will await cultures to tailor antibiotics or deescalate.  8 a.m. cortisol was WNL.  Creatinine slowly improving with fluids. S/p successful DCCV, now in NSR. Improving overall. Urine picking up. S/d to  floor.     Patient went back into AFib RVR successfully cardioverted again, in NSR now.  Electrolytes severely depleted this morning, may have contributed, will replace IV and recheck in the afternoon.  Urine output increased, 4 L/24 H.      Interval History:  NAEON.  No new issues.   Denies complaints.  UOP 4.3L /24h  All questions answered and updates on care given.       ROS:  General: Negative for fevers or chills.  Cardiac: Negative for chest pain or orthopnea   Pulmonary: Negative for dyspnea or wheezing.  GI: Negative for abdominal distention or pain     Vitals:    08/10/23 1300 08/10/23 1325 08/10/23 1400 08/10/23 1500   BP: 124/62  133/69 135/63   BP Location:       Pulse: (!) 57 (!) 58 (!) 59 65   Resp: (!) 21 20 19 (!) 27   Temp:    97.8 °F (36.6 °C)   TempSrc:    Oral   SpO2: (!) 94% (!) 94% (!) 91% (!) 92%   Weight:       Height:              Body mass index is 31.17 kg/m².      PHYSICAL EXAM:  GENERAL APPEARANCE: alert and cooperative, and appears to be in no acute distress.  HEENT:     HEAD: NC/AT     EYES: PERRL, EOMI.  Vision is grossly intact.  NECK: Neck supple, non-tender without LAD, masses or thyromegaly.  CARDIAC: There is no peripheral edema, cyanosis or pallor.   LUNGS: Clear to auscultation and percussion without rales or wheezing  ABDOMEN: Non-distended. No guarding.  MSK: No joint erythema or tenderness.   EXTREMITIES: No significant deformity or joint abnormality. No edema.   NEUROLOGICAL: CN II-XII grossly intact.   SKIN: Skin normal color, texture and turgor with no lesions or eruptions.  PSYCHIATRIC: Oriented. No tangential speech. No Hyperactive features.        Recent Results (from the past 24 hour(s))   Renal Function Panel     Collection Time: 08/10/23  6:03 AM   Result Value Ref Range    Glucose 114 (H) 70 - 110 mg/dL    Sodium 134 (L) 136 - 145 mmol/L    Potassium 2.9 (L) 3.5 - 5.1 mmol/L    Chloride 96 95 - 110 mmol/L    CO2 27 23 - 29 mmol/L    BUN 63 (H) 8 - 23 mg/dL    Calcium 8.5 (L) 8.7 - 10.5 mg/dL    Creatinine 4.6 (H) 0.5 - 1.4 mg/dL    Albumin 1.9 (L) 3.5 - 5.2 g/dL    Phosphorus 4.7 (H) 2.7 - 4.5 mg/dL    eGFR 13 (A) >60 mL/min/1.73 m^2    Anion Gap 11 8 - 16 mmol/L   Magnesium    Collection Time: 08/10/23  6:03 AM   Result Value Ref Range    Magnesium 1.3 (L) 1.6 - 2.6 mg/dL       Microbiology Results (last 7 days)       Procedure Component Value Units Date/Time    Blood culture [[id number]] Collected: 08/08/23 0819    Order Status: Completed Specimen: Blood Updated: 08/10/23 0903     Blood Culture, Routine No Growth to date      No Growth to date      No Growth to date    Blood culture [[id number]] Collected: 08/08/23 0807    Order Status: Completed Specimen: Blood from Peripheral, Right Hand Updated: 08/10/23 0903     Blood Culture, Routine No Growth to date      No Growth to date      No Growth to date             Imaging Results              US Retroperitoneal Complete (Final result)  Result time 08/07/23 03:33:50      Final result by Marquise Humphries MD (08/07/23 03:33:50)                   Impression:      There is no evidence for hydronephrosis bilaterally.    Mild elevated resistive index of the left kidney measuring 0.72 can be seen with medical renal disease.    Nonobstructing calculus of the right kidney.    Renal cysts are noted.      Electronically signed by: Marquise Humphries  Date:    08/07/2023  Time:    03:33               Narrative:    EXAMINATION:  US RETROPERITONEAL COMPLETE    CLINICAL HISTORY:  acute renal failure;    TECHNIQUE:  Ultrasound of the kidneys and urinary bladder was performed including color flow and Doppler evaluation of the kidneys.    COMPARISON:  None.    FINDINGS:  The right kidney  measures approximately 11.1 cm in length.  There is no evidence for hydronephrosis.  Color imaging demonstrates flow to the right kidney with a resistive index measurement of 0.60.  At the lower pole of the right kidney there is a 4.1 mm hyperechoic focus that may relate to a small nonobstructing calculus.  At the upper pole of the right kidney there is a hypoechoic finding measuring approximately 11 x 6.9 by 13.4 mm with posterior wall enhancement and increased through transmission, most consistent with a small cyst.    The left kidney measures approximately 13 cm in length.  There is no evidence for hydronephrosis.  Color imaging demonstrates flow to the left kidney with a resistive index measurement of 0.72.  At the mid to upper pole of the left kidney there is an exophytic hypoechoic finding measuring approximately 3.2 x 2.6 x 2.7 cm in size, consistent with a cyst.    The urinary bladder is identified measuring approximately 5 x 7.3 x 5.4 cm with a calculated volume of 102.42 mL.  Postvoid imaging is not available.    The prostate measures approximately 3.2 x 3 x 4.4 cm in size with a calculated volume of 22 cc.                                       X-Ray Chest AP Portable (Final result)  Result time 08/07/23 00:42:12      Final result by Marquise Humphries MD (08/07/23 00:42:12)                   Impression:      There is no radiographic evidence for acute intrathoracic process.      Electronically signed by: Marquise Humphries  Date:    08/07/2023  Time:    00:42               Narrative:    EXAMINATION:  XR CHEST AP PORTABLE    CLINICAL HISTORY:  Hypotension, unspecified    TECHNIQUE:  Single frontal view of the chest was performed.    COMPARISON:  Chest radiograph March 13, 2021    FINDINGS:  AP portable chest radiographic examination is submitted.  There is mild diminished depth of inspiration.  When accounting for position and technique and depth of inspiration, the appearance of the cardiomediastinal  silhouette is stable.  Aortic atherosclerotic change noted.    There is no evidence for confluent infiltrate or consolidation, significant pleural effusion or pneumothorax.    The osseous structures demonstrate chronic change.                                             Assessment/Plan:      * ARF (acute renal failure)  Patient with acute kidney injury/acute renal failure likely due to pre-renal azotemia due to dehydration LAM is currently worsening. Baseline creatinine unknown - Labs reviewed- Renal function/electrolytes with CrCl cannot be calculated (Unknown ideal weight.). according to latest data. Monitor urine output and serial BMP and adjust therapy as needed. Avoid nephrotoxins and renally dose meds for GFR listed above.    -Presented with LAM  -Cr on admit 7.4  -Baseline Cr unknown, but Cr 1.1 in 08/2022  -Suspect due to decreased PO intake and home meds (amolidpine-olmarsartan and chlorthalidone)  -Renal US with no obstruction. Findings consistent with CKD  -IVF  -Nephrology consulted: check FENA, pthi, and uric acid  -Avoid nephrotoxins, renal dose all meds.   -Monitor Is/Os  -Monitor       Sepsis  This patient does not have evidence of infective focus  My overall impression is sepsis.  Source: Unknown  Antibiotics given-   Antibiotics (72h ago, onward)      Start     Stop Route Frequency Ordered    08/08/23 1015  ceFEPIme (MAXIPIME) 1 g in dextrose 5 % in water (D5W) 100 mL IVPB (MB+)         -- IV Every 12 hours (non-standard times) 08/08/23 0901    08/08/23 1015  doxycycline tablet 100 mg         -- Oral Every 12 hours 08/08/23 0901    08/08/23 0900  mupirocin 2 % ointment         08/13/23 0859 Nasl 2 times daily 08/08/23 0725          Latest lactate reviewed-  Recent Labs   Lab 08/08/23  0807   LACTATE 0.8     Organ dysfunction indicated by Acute kidney injury and New afib    Fluid challenge Actual Body weight- Patient will receive 30ml/kg actual body weight to calculate fluid bolus for treatment of  septic shock.     Post- resuscitation assessment Yes Perfusion exam was performed within 6 hours of septic shock presentation after bolus shows Adequate tissue perfusion assessed by non-invasive monitoring       Will ordered but not started, responding to fluids Pressors- Levophed for MAP of 65  Source control achieved by: Abx and fluids    Patient admitted with hypotension and leukocytosis, and feeling unwell for the past couple weeks.  Patient's blood pressure again low today at 70/49, concerns he could bacteremia or another infection.  No skin infections appreciated. Urine clean. No consolidation on CXR.  Initial presentation of AFib (and ARF) often in the setting of infection due to catecholamine release and hypotension, as well.    He is afebrile, however this does not exclude infection.    Blood cultures taken.  , also concerning for an ongoing inflammatory issue/infection.    Given acute renal failure, will hold off on vancomycin for now.    Will initiate IV cefepime and oral doxycycline.   Will await cultures to tailor antibiotics or deescalate.      Class 1 obesity with body mass index (BMI) of 31.0 to 31.9 in adult  Body mass index is 31.17 kg/m². Morbid obesity complicates all aspects of disease management from diagnostic modalities to treatment. Weight loss encouraged and health benefits explained to patient.         Leukocytosis  -WBC# 16.07 on admission w/hypotension. Pt afebrile. CXR with no acute process.   -Pt with leukocytosis, suspect reactive. Low suspicion for infection at this time.   -Hypotension improved with fluids  -Continue IVF  -Echo ordered  -Will monitor off abx at this time   -repeat labs in the AM     Hypotension  -BP as low as 63/41 in the ED  -Suspect due to pt continuing home meds (amolidpine-olmarsartan and chlorthalidone) in setting of LAM and decreased PO intake/dehydration  -Hypotension improved with fluids  -Continue IVF  -Pt with leukocytosis, suspect reactive. Low  suspicion for infection at this time.   -Echo ordered  -Hold home BP meds as above  -Monitor     Dehydration  -Continue IVF as above  -See plan for LAM    Tobacco abuse  - Patient was counseled regarding smoking for 3-10 minutes.  - Encourage smoking cessation daily  - NRT offered         Heroin abuse  Negative on admission, but since IVDU hx higher risk for bacteremia        VTE Risk Mitigation (From admission, onward)           Ordered     apixaban tablet 5 mg  2 times daily         08/08/23 1634     IP VTE LOW RISK PATIENT  Once         08/07/23 0211     Place sequential compression device  Until discontinued         08/07/23 0211                    Discharge Planning   ELODIA:      Code Status: Full Code   Is the patient medically ready for discharge?:     Reason for patient still in hospital (select all that apply): Patient trending condition and Treatment  Discharge Plan A: Home with family            Critical care time spent on the evaluation and treatment of severe organ dysfunction, review of pertinent labs and imaging studies, discussions with consulting providers and discussions with patient/family: 35 minutes.      Glynn Blunt MD  Department of Hospital Medicine   [facility] [place] [facility]

## 2023-08-10 NOTE — ANESTHESIA POSTPROCEDURE EVALUATION
Anesthesia Post Evaluation    Patient: Vivek Whitman    Procedure(s) Performed: Procedure(s) (LRB):  Cardioversion or Defibrillation (N/A)  Cardioversion (N/A)    Final Anesthesia Type: general      Patient location during evaluation: ICU  Patient participation: Yes- Able to Participate  Level of consciousness: awake and alert and oriented  Post-procedure vital signs: reviewed and stable  Pain management: adequate  Airway patency: patent    PONV status at discharge: No PONV  Anesthetic complications: no      Cardiovascular status: blood pressure returned to baseline, hemodynamically stable and stable  Respiratory status: unassisted, spontaneous ventilation and room air  Hydration status: euvolemic  Follow-up not needed.          Vitals Value Taken Time   /72 08/10/23 1232   Temp  08/10/23 1243   Pulse 55 08/10/23 1243   Resp 21 08/10/23 1243   SpO2 94 % 08/10/23 1243   Vitals shown include unvalidated device data.      No case tracking events are documented in the log.      Pain/Bonifacio Score: No data recorded

## 2023-08-10 NOTE — ASSESSMENT & PLAN NOTE
afib with rvr. Likely ppted by lolis.  Heparin drip. amio drip for rate control.   Silver cv in am if continues to be in afib  Not a candidate for long term amio because of severe pulm disease.    8/8:  Continues to be in AFib with RVR.  Will do SILVER and cardioversion today    -No absolute contraindications of esophageal stricture, tumor, perforation, laceration,or diverticulum and/or active GI bleed  -The risks, benefits & alternatives of the procedure were explained to the patient.   -The risks of transesophageal echo include but are not limited to:  Dental trauma, esophageal trauma/perforation, bleeding, laryngospasm/brochospasm, aspiration, sore throat/hoarseness, & dislodgement of the endotracheal tube/nasogastric tube (where applicable).    -The risks of ANES monitored sedation include hypotension, respiratory depression, arrhythmias, bronchospasm, & death.    -Informed consent was obtained. The patient is agreeable to proceed with the procedure and all questions and concerns addressed.      Risks, benefits and alternatives of the SILVER/Cardioversion procedure were discussed with the patient including throat irritation, aspiration, anesthetic complications, esophageal irritation/perforation, skin irritation, arrhythmia, etc.  Patient understands and agrees to proceed.  Consent was placed on the chart.      8/9:s/p silver and cv yesterday. Continues to be in nsr. initiate oac.

## 2023-08-10 NOTE — PROGRESS NOTES
[facility] Intensive Care  Cardiology  Progress Note    Patient Name: Vivek Whitman  MRN: [id number]  Admission Date: 8/6/2023  Hospital Length of Stay: 2 days  Code Status: Full Code   Attending Physician: Glynn Blunt MD   Primary Care Physician: Racheal, Primary Doctor  Expected Discharge Date:   Principal Problem:ARF (acute renal failure)    Subjective:       Interval History: back in nsr s/p cv    Review of Systems   Constitutional: Positive for malaise/fatigue.   HENT: Negative.     Eyes: Negative.    Endocrine: Negative.    Hematologic/Lymphatic: Negative.    Skin: Negative.    Musculoskeletal: Negative.    Gastrointestinal: Negative.    Genitourinary: Negative.    Neurological: Negative.    Psychiatric/Behavioral: Negative.     Allergic/Immunologic: Negative.      Objective:     Vital Signs (Most Recent):  Temp: 98.5 °F (36.9 °C) (08/09/23 2000)  Pulse: 75 (08/09/23 2021)  Resp: (!) 22 (08/09/23 2021)  BP: (!) 115/57 (08/09/23 2000)  SpO2: 95 % (08/09/23 2021) Vital Signs (24h Range):  Temp:  [97.9 °F (36.6 °C)-98.5 °F (36.9 °C)] 98.5 °F (36.9 °C)  Pulse:  [65-78] 75  Resp:  [0-28] 22  SpO2:  [91 %-97 %] 95 %  BP: (102-165)/(51-86) 115/57     Weight: 93 kg (205 lb)  Body mass index is 31.17 kg/m².     SpO2: 95 %         Intake/Output Summary (Last 24 hours) at 8/9/2023 2253  Last data filed at 8/9/2023 2000  Gross per 24 hour   Intake 2288.88 ml   Output 2900 ml   Net -611.12 ml       Lines/Drains/Airways       Peripheral Intravenous Line  Duration                  Peripheral IV - Single Lumen 08/06/23 2150 18 G Right Antecubital 3 days                       Physical Exam  Vitals reviewed.   Constitutional:       Appearance: He is well-developed.   HENT:      Head: Normocephalic.   Eyes:      Conjunctiva/sclera: Conjunctivae normal.      Pupils: Pupils are equal, round, and reactive to light.   Cardiovascular:      Rate and Rhythm: Normal rate and regular rhythm.      Heart sounds: Normal heart sounds.   Pulmonary:  "     Effort: Pulmonary effort is normal.      Breath sounds: Normal breath sounds.   Abdominal:      General: Bowel sounds are normal.      Palpations: Abdomen is soft.   Musculoskeletal:      Cervical back: Normal range of motion and neck supple.   Skin:     General: Skin is warm.   Neurological:      Mental Status: He is alert and oriented to person, place, and time.            Significant Labs: BMP:   Recent Labs   Lab 08/08/23  0522 08/09/23  0601    107   * 134*   K 3.4* 3.6   CL 94* 96   CO2 23 22*   BUN 84* 80*   CREATININE 6.9* 6.1*   CALCIUM 8.7 8.5*   MG 1.9 1.6   , CMP   Recent Labs   Lab 08/08/23  0522 08/09/23  0601   * 134*   K 3.4* 3.6   CL 94* 96   CO2 23 22*    107   BUN 84* 80*   CREATININE 6.9* 6.1*   CALCIUM 8.7 8.5*   ANIONGAP 15 16   , CBC   Recent Labs   Lab 08/08/23  0522 08/09/23  0601   WBC 13.16*  13.16* 13.06*   HGB 10.8*  10.8* 12.3*   HCT 31.6*  31.6* 35.8*     169 180   , Lipid Panel No results for input(s): "CHOL", "HDL", "LDLCALC", "TRIG", "CHOLHDL" in the last 48 hours., Troponin No results for input(s): "TROPONINI" in the last 48 hours., and All pertinent lab results from the last 24 hours have been reviewed.    Significant Imaging: Echocardiogram: Transthoracic echo (TTE) complete (Cupid Only):   Results for orders placed or performed during the hospital encounter of 08/06/23   Echo   Result Value Ref Range    LVOT stroke volume 93.88 cm3    LVIDd 4.57 3.5 - 6.0 cm    LV Systolic Volume 33.45 mL    LVIDs 2.95 2.1 - 4.0 cm    LV Diastolic Volume 95.78 mL    IVS 1.29 (A) 0.6 - 1.1 cm    LVOT diameter 2.01 cm    LVOT area 3.2 cm2    FS 35 28 - 44 %    Left Ventricle Relative Wall Thickness 0.56 cm    Posterior Wall 1.28 (A) 0.6 - 1.1 cm    LV mass 224.03 g    MV Peak E Gonzalez 0.79 m/s    TDI LATERAL 0.07 m/s    TDI SEPTAL 0.05 m/s    E/E' ratio 13.17 m/s    MV Peak A Gonzalez 0.79 m/s    TR Max Gonzalez 2.24 m/s    E/A ratio 1.00     IVRT 140.82 msec    E wave " deceleration time 207.32 msec    LV SEPTAL E/E' RATIO 15.80 m/s    LV LATERAL E/E' RATIO 11.29 m/s    LVOT peak ginny 1.31 m/s    Left Ventricular Outflow Tract Mean Velocity 0.82 cm/s    Left Ventricular Outflow Tract Mean Gradient 3.37 mmHg    LA size 4.11 cm    Left Atrium Major Axis 5.80 cm    Left Atrium Minor Axis 5.83 cm    RV S' 16.17 cm/s    TAPSE 2.14 cm    RA Major Axis 6.64 cm    AV mean gradient 9 mmHg    AV peak gradient 17 mmHg    Ao peak ginny 2.06 m/s    Ao VTI 37.70 cm    LVOT peak VTI 29.60 cm    AV valve area 2.49 cm²    AV Velocity Ratio 0.64     AV index (prosthetic) 0.79     DELMER by Velocity Ratio 2.02 cm²    MV mean gradient 1 mmHg    MV peak gradient 4 mmHg    MV stenosis pressure 1/2 time 60.12 ms    MV valve area p 1/2 method 3.66 cm2    MV valve area by continuity eq 3.48 cm2    MV VTI 27.0 cm    Triscuspid Valve Regurgitation Peak Gradient 20 mmHg    PV PEAK VELOCITY 1.07 m/s    PV peak gradient 5 mmHg    Sinus 3.55 cm    STJ 1.93 cm    Ascending aorta 2.73 cm    IVC diameter 1.74 cm    Mean e' 0.06 m/s    RVDD 4.55 cm    LA volume 97.51 cm3    LA WIDTH 4.8 cm    RA Width 4.2 cm    TV resting pulmonary artery pressure 23 mmHg    RV TB RVSP 5 mmHg    Est. RA pres 3 mmHg    Narrative      Left Ventricle: The left ventricle is normal in size. Mildly increased   wall thickness. Normal wall motion. There is normal systolic function with   a visually estimated ejection fraction of 65 - 70%. Grade I diastolic   dysfunction.    Left Atrium: Left atrium is severely dilated.    Right Ventricle: Normal right ventricular cavity size. Systolic   function is normal.    Right Atrium: Right atrium is moderately dilated.    Aortic Valve: There is aortic valve sclerosis.    Tricuspid Valve: There is mild regurgitation.    Pulmonary Artery: The estimated pulmonary artery systolic pressure is   23 mmHg.    IVC/SVC: Normal venous pressure at 3 mmHg.       Assessment and Plan:     Brief HPI:     * ARF  (acute renal failure)  Iv hydration    A-fib  afib with rvr. Likely ppted by lolis.  Heparin drip. amio drip for rate control.   Silver cv in am if continues to be in afib  Not a candidate for long term amio because of severe pulm disease.    8/8:  Continues to be in AFib with RVR.  Will do SILVER and cardioversion today    -No absolute contraindications of esophageal stricture, tumor, perforation, laceration,or diverticulum and/or active GI bleed  -The risks, benefits & alternatives of the procedure were explained to the patient.   -The risks of transesophageal echo include but are not limited to:  Dental trauma, esophageal trauma/perforation, bleeding, laryngospasm/brochospasm, aspiration, sore throat/hoarseness, & dislodgement of the endotracheal tube/nasogastric tube (where applicable).    -The risks of ANES monitored sedation include hypotension, respiratory depression, arrhythmias, bronchospasm, & death.    -Informed consent was obtained. The patient is agreeable to proceed with the procedure and all questions and concerns addressed.      Risks, benefits and alternatives of the SILVER/Cardioversion procedure were discussed with the patient including throat irritation, aspiration, anesthetic complications, esophageal irritation/perforation, skin irritation, arrhythmia, etc.  Patient understands and agrees to proceed.  Consent was placed on the chart.      8/9:s/p silver and cv yesterday. Continues to be in nsr. initiate oac.     Hypotension  Likely secondry to dehydration. Continue to monitor response to iv hydration. Hold anti hypertensives. Avoid nephrotoxic agents    Dehydration  Iv hydration    Tobacco abuse        Heroin abuse  States is now clean        VTE Risk Mitigation (From admission, onward)         Ordered     apixaban tablet 5 mg  2 times daily         08/08/23 1634     IP VTE LOW RISK PATIENT  Once         08/07/23 0211     Place sequential compression device  Until discontinued         08/07/23 0211                 Dank Claudio MD  Cardiology  [facility] - Intensive Care      Critical Care Time:  35 minutes     Critical care was time spent personally by me on the following activities: development of treatment plan with patient or surrogate and bedside caregivers, discussions with consultants, evaluation of patient's response to treatment, examination of patient, ordering and performing treatments and interventions, ordering and review of laboratory studies, ordering and review of radiographic studies, pulse oximetry, re-evaluation of patient's condition. This critical care time did not overlap with that of any other provider or involve time for any procedures.

## 2023-08-10 NOTE — PROGRESS NOTES
Pharmacist Renal Dose Adjustment Note    Vivek Whitman is a 62 y.o. male being treated with the medication Cefepime    Patient Data:    Vital Signs (Most Recent):  Temp: 98.4 °F (36.9 °C) (08/10/23 0000)  Pulse: (!) 121 (08/10/23 0755)  Resp: 18 (08/10/23 0755)  BP: 117/61 (08/10/23 0700)  SpO2: (!) 92 % (08/10/23 0700) Vital Signs (72h Range):  Temp:  [97.9 °F (36.6 °C)-98.7 °F (37.1 °C)]   Pulse:  []   Resp:  [0-34]   BP: ()/(48-97)   SpO2:  [75 %-100 %]      Recent Labs   Lab 08/08/23  0522 08/09/23  0601 08/10/23  0603   CREATININE 6.9* 6.1* 4.6*     Serum creatinine: 4.6 mg/dL (H) 08/10/23 0603  Estimated creatinine clearance: 17.9 mL/min (A)    Medication:Cefepime 1 gram every 12 hours will be changed to Cefepime 1 gram every 24 hours    Pharmacist's Name: Alvin Hi Jr  Pharmacist's Extension: [phone]

## 2023-08-11 PROBLEM — I48.0 PAROXYSMAL ATRIAL FIBRILLATION: Status: ACTIVE | Noted: 2023-08-07

## 2023-08-11 LAB
ALBUMIN SERPL BCP-MCNC: 1.8 G/DL (ref 3.5–5.2)
ALP SERPL-CCNC: 73 U/L (ref 55–135)
ALT SERPL W/O P-5'-P-CCNC: 48 U/L (ref 10–44)
ANION GAP SERPL CALC-SCNC: 9 MMOL/L (ref 8–16)
AST SERPL-CCNC: 47 U/L (ref 10–40)
BASOPHILS # BLD AUTO: 0.04 K/UL (ref 0–0.2)
BASOPHILS NFR BLD: 0.4 % (ref 0–1.9)
BILIRUB SERPL-MCNC: 0.9 MG/DL (ref 0.1–1)
BUN SERPL-MCNC: 50 MG/DL (ref 8–23)
CALCIUM SERPL-MCNC: 8.5 MG/DL (ref 8.7–10.5)
CHLORIDE SERPL-SCNC: 97 MMOL/L (ref 95–110)
CO2 SERPL-SCNC: 30 MMOL/L (ref 23–29)
CREAT SERPL-MCNC: 3.5 MG/DL (ref 0.5–1.4)
CV STRESS BASE HR: 54 BPM
DIASTOLIC BLOOD PRESSURE: 68 MMHG
DIFFERENTIAL METHOD: ABNORMAL
EOSINOPHIL # BLD AUTO: 0.1 K/UL (ref 0–0.5)
EOSINOPHIL NFR BLD: 1.1 % (ref 0–8)
ERYTHROCYTE [DISTWIDTH] IN BLOOD BY AUTOMATED COUNT: 14 % (ref 11.5–14.5)
EST. GFR  (NO RACE VARIABLE): 19 ML/MIN/1.73 M^2
GLUCOSE SERPL-MCNC: 109 MG/DL (ref 70–110)
HCT VFR BLD AUTO: 36 % (ref 40–54)
HGB BLD-MCNC: 12 G/DL (ref 14–18)
IMM GRANULOCYTES # BLD AUTO: 0.06 K/UL (ref 0–0.04)
IMM GRANULOCYTES NFR BLD AUTO: 0.5 % (ref 0–0.5)
LYMPHOCYTES # BLD AUTO: 1.6 K/UL (ref 1–4.8)
LYMPHOCYTES NFR BLD: 13.6 % (ref 18–48)
MAGNESIUM SERPL-MCNC: 1.9 MG/DL (ref 1.6–2.6)
MCH RBC QN AUTO: 30.3 PG (ref 27–31)
MCHC RBC AUTO-ENTMCNC: 33.3 G/DL (ref 32–36)
MCV RBC AUTO: 91 FL (ref 82–98)
MONOCYTES # BLD AUTO: 1.3 K/UL (ref 0.3–1)
MONOCYTES NFR BLD: 11.4 % (ref 4–15)
NEUTROPHILS # BLD AUTO: 8.3 K/UL (ref 1.8–7.7)
NEUTROPHILS NFR BLD: 73 % (ref 38–73)
NRBC BLD-RTO: 0 /100 WBC
NUC STRESS DIASTOLIC VOLUME INDEX: 82
NUC STRESS EJECTION FRACTION: 64 %
NUC STRESS SYSTOLIC VOLUME INDEX: 29
OHS CV CPX 85 PERCENT MAX PREDICTED HEART RATE MALE: 133
OHS CV CPX MAX PREDICTED HEART RATE: 156
OHS CV CPX PATIENT IS FEMALE: 0
OHS CV CPX PATIENT IS MALE: 1
OHS CV CPX PEAK DIASTOLIC BLOOD PRESSURE: 57 MMHG
OHS CV CPX PEAK HEAR RATE: 69 BPM
OHS CV CPX PEAK RATE PRESSURE PRODUCT: 7452
OHS CV CPX PEAK SYSTOLIC BLOOD PRESSURE: 108 MMHG
OHS CV CPX PERCENT MAX PREDICTED HEART RATE ACHIEVED: 44
OHS CV CPX RATE PRESSURE PRODUCT PRESENTING: 6480
PHOSPHATE SERPL-MCNC: 3.7 MG/DL (ref 2.7–4.5)
PLATELET # BLD AUTO: 195 K/UL (ref 150–450)
PMV BLD AUTO: 9.7 FL (ref 9.2–12.9)
POTASSIUM SERPL-SCNC: 3.5 MMOL/L (ref 3.5–5.1)
PROT SERPL-MCNC: 5.6 G/DL (ref 6–8.4)
RBC # BLD AUTO: 3.96 M/UL (ref 4.6–6.2)
SODIUM SERPL-SCNC: 136 MMOL/L (ref 136–145)
SYSTOLIC BLOOD PRESSURE: 120 MMHG
WBC # BLD AUTO: 11.4 K/UL (ref 3.9–12.7)

## 2023-08-11 PROCEDURE — 93005 ELECTROCARDIOGRAM TRACING: CPT

## 2023-08-11 PROCEDURE — 25000242 PHARM REV CODE 250 ALT 637 W/ HCPCS: Performed by: INTERNAL MEDICINE

## 2023-08-11 PROCEDURE — 93010 ELECTROCARDIOGRAM REPORT: CPT | Mod: ,,, | Performed by: INTERNAL MEDICINE

## 2023-08-11 PROCEDURE — 25000003 PHARM REV CODE 250: Performed by: STUDENT IN AN ORGANIZED HEALTH CARE EDUCATION/TRAINING PROGRAM

## 2023-08-11 PROCEDURE — 99233 PR SUBSEQUENT HOSPITAL CARE,LEVL III: ICD-10-PCS | Mod: 25,,, | Performed by: INTERNAL MEDICINE

## 2023-08-11 PROCEDURE — 27000221 HC OXYGEN, UP TO 24 HOURS

## 2023-08-11 PROCEDURE — 99233 SBSQ HOSP IP/OBS HIGH 50: CPT | Mod: 25,,, | Performed by: INTERNAL MEDICINE

## 2023-08-11 PROCEDURE — 97165 OT EVAL LOW COMPLEX 30 MIN: CPT

## 2023-08-11 PROCEDURE — 63600175 PHARM REV CODE 636 W HCPCS: Performed by: STUDENT IN AN ORGANIZED HEALTH CARE EDUCATION/TRAINING PROGRAM

## 2023-08-11 PROCEDURE — 80053 COMPREHEN METABOLIC PANEL: CPT | Performed by: STUDENT IN AN ORGANIZED HEALTH CARE EDUCATION/TRAINING PROGRAM

## 2023-08-11 PROCEDURE — 94760 N-INVAS EAR/PLS OXIMETRY 1: CPT

## 2023-08-11 PROCEDURE — 97161 PT EVAL LOW COMPLEX 20 MIN: CPT

## 2023-08-11 PROCEDURE — 84100 ASSAY OF PHOSPHORUS: CPT | Performed by: STUDENT IN AN ORGANIZED HEALTH CARE EDUCATION/TRAINING PROGRAM

## 2023-08-11 PROCEDURE — 25000003 PHARM REV CODE 250: Performed by: INTERNAL MEDICINE

## 2023-08-11 PROCEDURE — 63600175 PHARM REV CODE 636 W HCPCS: Performed by: INTERNAL MEDICINE

## 2023-08-11 PROCEDURE — 85025 COMPLETE CBC W/AUTO DIFF WBC: CPT | Performed by: STUDENT IN AN ORGANIZED HEALTH CARE EDUCATION/TRAINING PROGRAM

## 2023-08-11 PROCEDURE — 94640 AIRWAY INHALATION TREATMENT: CPT

## 2023-08-11 PROCEDURE — 83735 ASSAY OF MAGNESIUM: CPT | Performed by: STUDENT IN AN ORGANIZED HEALTH CARE EDUCATION/TRAINING PROGRAM

## 2023-08-11 PROCEDURE — 93010 EKG 12-LEAD: ICD-10-PCS | Mod: ,,, | Performed by: INTERNAL MEDICINE

## 2023-08-11 PROCEDURE — S4991 NICOTINE PATCH NONLEGEND: HCPCS | Performed by: INTERNAL MEDICINE

## 2023-08-11 PROCEDURE — 36415 COLL VENOUS BLD VENIPUNCTURE: CPT | Performed by: STUDENT IN AN ORGANIZED HEALTH CARE EDUCATION/TRAINING PROGRAM

## 2023-08-11 PROCEDURE — 94761 N-INVAS EAR/PLS OXIMETRY MLT: CPT

## 2023-08-11 PROCEDURE — 11000001 HC ACUTE MED/SURG PRIVATE ROOM

## 2023-08-11 RX ORDER — REGADENOSON 0.08 MG/ML
0.4 INJECTION, SOLUTION INTRAVENOUS ONCE
Status: COMPLETED | OUTPATIENT
Start: 2023-08-11 | End: 2023-08-11

## 2023-08-11 RX ADMIN — CEFEPIME 1 G: 1 INJECTION, POWDER, FOR SOLUTION INTRAMUSCULAR; INTRAVENOUS at 09:08

## 2023-08-11 RX ADMIN — IPRATROPIUM BROMIDE AND ALBUTEROL SULFATE 3 ML: 2.5; .5 SOLUTION RESPIRATORY (INHALATION) at 07:08

## 2023-08-11 RX ADMIN — DOXYCYCLINE HYCLATE 100 MG: 100 TABLET, COATED ORAL at 09:08

## 2023-08-11 RX ADMIN — ATORVASTATIN CALCIUM 40 MG: 40 TABLET, FILM COATED ORAL at 09:08

## 2023-08-11 RX ADMIN — FAMOTIDINE 20 MG: 20 TABLET, FILM COATED ORAL at 09:08

## 2023-08-11 RX ADMIN — SODIUM CHLORIDE, POTASSIUM CHLORIDE, SODIUM LACTATE AND CALCIUM CHLORIDE: 600; 310; 30; 20 INJECTION, SOLUTION INTRAVENOUS at 07:08

## 2023-08-11 RX ADMIN — MUPIROCIN: 20 OINTMENT TOPICAL at 09:08

## 2023-08-11 RX ADMIN — IPRATROPIUM BROMIDE AND ALBUTEROL SULFATE 3 ML: 2.5; .5 SOLUTION RESPIRATORY (INHALATION) at 01:08

## 2023-08-11 RX ADMIN — SODIUM CHLORIDE, POTASSIUM CHLORIDE, SODIUM LACTATE AND CALCIUM CHLORIDE: 600; 310; 30; 20 INJECTION, SOLUTION INTRAVENOUS at 03:08

## 2023-08-11 RX ADMIN — ALLOPURINOL 100 MG: 100 TABLET ORAL at 09:08

## 2023-08-11 RX ADMIN — NICOTINE 1 PATCH: 7 PATCH, EXTENDED RELEASE TRANSDERMAL at 09:08

## 2023-08-11 RX ADMIN — IPRATROPIUM BROMIDE AND ALBUTEROL SULFATE 3 ML: 2.5; .5 SOLUTION RESPIRATORY (INHALATION) at 08:08

## 2023-08-11 RX ADMIN — APIXABAN 5 MG: 5 TABLET, FILM COATED ORAL at 09:08

## 2023-08-11 RX ADMIN — REGADENOSON 0.4 MG: 0.08 INJECTION, SOLUTION INTRAVENOUS at 10:08

## 2023-08-11 RX ADMIN — ASPIRIN 81 MG: 81 TABLET, COATED ORAL at 09:08

## 2023-08-11 NOTE — PT/OT/SLP EVAL
"Physical Therapy Evaluation    Patient Name:  Vivek Whitman   MRN:  [id number]    Recommendations:     Discharge Recommendations: home   Discharge Equipment Recommendations:  (ongoing)   Barriers to discharge:  Pt in ICU, agitated    Assessment:     Vivek Whitman is a 62 y.o. male admitted with a medical diagnosis of ARF (acute renal failure).  He presents with the following impairments/functional limitations: weakness, gait instability, impaired endurance, impaired balance, impaired coordination, decreased safety awareness, impaired functional mobility, decreased coordination .    Rehab Prognosis: Fair; patient would benefit from acute skilled PT services to address these deficits and reach maximum level of function.    Recent Surgery: Procedure(s) (LRB):  Cardioversion or Defibrillation (N/A)  Cardioversion (N/A) 1 Day Post-Op    Plan:     During this hospitalization, patient to be seen  (3-5x/wk) to address the identified rehab impairments via gait training, therapeutic activities, therapeutic exercises and progress toward the following goals:    Plan of Care Expires:  08/18/23    Subjective     Chief Complaint: "you making me do things I don't want to do"   Patient/Family Comments/goals: "Im calling my daughter to come pick me up"  Pain/Comfort:  Pain Rating 1: 0/10    Patients cultural, spiritual, Hindu conflicts given the current situation:      Living Environment:  Pt lives with his "cousin and wife"?, [facility] with no EVETTE  Prior to admission, patients level of function was Independent per pt.  Equipment used at home: none.  DME owned (not currently used): none.  Upon discharge, patient will have assistance from Daughter?.    Objective:     Communicated with nsg prior to session.  Patient found sitting edge of bed with  (ICU monitoring)  upon PT entry to room.    General Precautions: Standard, fall  Orthopedic Precautions:N/A   Braces: N/A  Respiratory Status: Room air    Exams:  Cognitive Exam:  Patient is " oriented to Person and Place  Gross Motor Coordination:  impaired 2/2 weakness  Postural Exam:  Patient presented with the following abnormalities:    -       Rounded shoulders  -       Forward head  Sensation: N/TSkin Integrity/Edema:      -       Skin integrity: Visible skin intact  -       Edema: None noted   RLE ROM: WFL  RLE Strength: WFL  LLE ROM: WFL  LLE Strength: WFL    Functional Mobility:  Bed Mobility:     Scooting: stand by assistance  Sit to Supine: stand by assistance  Transfers:     Sit to Stand:  minimum assistance with hand-held assist  Gait: Min A/HHA x 3 sidesteps at EOB  Balance: Fair+ sit, FAir stand      AM-PAC 6 CLICK MOBILITY  Total Score:18       Treatment & Education:  Dangle protocol: HR 67, /78, SPO2 92%    Patient left HOB elevated with all lines intact, call button in reach, and nsg notified.    GOALS:   Multidisciplinary Problems       Physical Therapy Goals          Problem: Physical Therapy    Goal Priority Disciplines Outcome Goal Variances Interventions   Physical Therapy Goal     PT, PT/OT Ongoing, Progressing     Description: Goals to be met by: 23     Patient will increase functional independence with mobility by performin. Sit to stand transfer with Modified [place]  2. Gait  x 150 feet with Modified [place] .   3. Lower extremity exercise program x10 reps per handout, with independence                         History:     Past Medical History:   Diagnosis Date    Hypertension        Past Surgical History:   Procedure Laterality Date    CARDIOVERSION N/A 2023    Procedure: Cardioversion;  Surgeon: Dank Claudio MD;  Location: [facility] CATH LAB;  Service: Cardiology;  Laterality: N/A;    CARDIOVERSION N/A 8/10/2023    Procedure: Cardioversion;  Surgeon: Dank Claudio MD;  Location: [facility] CATH LAB;  Service: Cardiology;  Laterality: N/A;    HERNIA REPAIR      TRANSESOPHAGEAL ECHOCARDIOGRAM WITH POSSIBLE CARDIOVERSION (KASHIF W/ POSS CARDIOVERSION)  N/A 8/8/2023    Procedure: Transesophageal echo (KASHIF) intra-procedure log documentation;  Surgeon: Dank Claudio MD;  Location: [facility] CATH LAB;  Service: Cardiology;  Laterality: N/A;    TRANSESOPHAGEAL ECHOCARDIOGRAPHY N/A 8/8/2023    Procedure: ECHOCARDIOGRAM, TRANSESOPHAGEAL;  Surgeon: Dank Claudio MD;  Location: [facility] CATH LAB;  Service: Cardiology;  Laterality: N/A;    TREATMENT OF CARDIAC ARRHYTHMIA N/A 8/10/2023    Procedure: Cardioversion or Defibrillation;  Surgeon: Dank Claudio MD;  Location: [facility] CATH LAB;  Service: Cardiology;  Laterality: N/A;       Time Tracking:     PT Received On: 08/11/23  PT Start Time: 1421     PT Stop Time: 1430  PT Total Time (min): 9 min     Billable Minutes: Evaluation 9 co-evaluation with OT      08/11/2023

## 2023-08-11 NOTE — NURSING TRANSFER
Nursing Transfer Note      8/11/2023   6:18 PM    Nurse giving handoff:GAURANG Crowley  Nurse receiving handoff:April    Reason patient is being transferred: downgraded level of care    Transfer To: [id number]    Transfer via wheelchair    Transfer with cardiac monitoring    Transported by RN    Transfer Vital Signs:  Blood Pressure:132/69  Heart Rate:60  O2:95  Temperature:98.3  Respirations:18    Telemetry: Box Number [id number], Rate 60, Rhythm SR, and Telemetry  Bisi  Order for Tele Monitor? Yes    Additional Lines: Oxygen    4eyes on Skin: yes    Medicines sent: None    Any special needs or follow-up needed: none    Patient belongings transferred with patient: Yes    Chart send with patient: No    Notified: pt notified family    Patient reassessed at: 8/11/23, 1600 (date, time)  1  Upon arrival to floor: cardiac monitor applied, patient oriented to room, call bell in reach, and bed in lowest position

## 2023-08-11 NOTE — CARE UPDATE
[facility], [facility]  Nurses Note -- 4 Eyes      8/11/2023       Skin assessed on: Q Shift      [x] No Pressure Injuries Present    [x]Prevention Measures Documented    [] Yes LDA  for Pressure Injury Previously documented     [] Yes New Pressure Injury Discovered   [] LDA for New Pressure Injury Added      Attending RN:  Jing Hidalgo RN     Second RN:  Fanny

## 2023-08-11 NOTE — PROGRESS NOTES
[facility] Medicine  Progress Note    Patient Name: Vivek Whitman  MRN: [id number]  Patient Class: IP- Inpatient   Admission Date: 8/6/2023  Length of Stay: 4 days  Attending Physician: Glynn Blunt MD  Primary Care Provider: Racheal, Primary Doctor        Subjective:     Principal Problem:ARF (acute renal failure)        HPI:   62 y.o. male, with a PMHx of HTN, HLD, tobacco abuse, and hx of heroin abuse/alcohol abuse transferred from [facility] ED for further evaluation. He presented to the ED with cocerns of low blood pressure. Stated for the past week, he had low blood pressure readings with SBP 90-100s and DBP 50s. Noted hypotension associated with feeling fatigue, weak, and make him just want to sleep all day. Stated he has not eat or drink much. However, continued to take his home BP medications. Tried to hydrate himself with gatorade prior to coming to the ED. Denies any fever, chest pain, cough, shortness of breath, abdominal pain, nausea or vomiting. Admits he still smokes 1ppd, smoke for the past 40+ years.     In the ED, pt hypotensive with BP 94/53. Patient given IVF. Labs significant for leukocytosis, elevated Cr of 7.4, and elevated phosphorus. US renal with no evidence for hydronephrosis bilaterally.  CXR with no focal consolidations or edema. Patient admitted to  for further evaluation.       Overview/Hospital Course:  Patient admitted with hypotension and leukocytosis, and feeling unwell for the past couple weeks.  Patient's blood pressure again low today at 70/49, concerns he could bacteremia or another infection.  Initial presentation of AFib (and ARF) often in the setting of infection due to catecholamine release and hypotension, as well.  He is afebrile, however this does not exclude infection. Hx of IVDU makes him higher risk. Blood cultures taken.  , also concerning for an ongoing inflammatory issue/infection.  Given acute renal failure, will hold off on vancomycin for  now.  Will initiate IV cefepime and oral doxycycline.  Will await cultures to tailor antibiotics or deescalate.  8 a.m. cortisol was WNL.  Creatinine slowly improving with fluids. S/p successful DCCV, now in NSR. Improving overall. Urine picking up. S/d to  floor.     Patient went back into AFib RVR successfully cardioverted again, in NSR now.  Sotalol initiated.  Electrolytes severely depleted this morning, may have contributed, will replace IV and recheck in the afternoon.  Urine output increased, 4 L/24 H. WBC# normalized. Still in NSR. Will ask PT/OT to assess, likely home with HH tomorrow if HR still controlled.       Interval History:  NAEON.  No new issues.   Denies complaints.  Cr improving daily  All questions answered and updates on care given.       ROS:  General: Negative for fevers or chills.  Cardiac: Negative for chest pain or orthopnea   Pulmonary: Negative for dyspnea or wheezing.  GI: Negative for abdominal distention or pain     Vitals:    08/11/23 0400 08/11/23 0500 08/11/23 0600 08/11/23 0752   BP: 124/60 (!) 117/57 (!) 145/80    BP Location: Left arm Left arm Left arm    Pulse: (!) 59 (!) 55 (!) 55 (!) 56   Resp: 13 17 (!) 29 (!) 21   Temp:       TempSrc:       SpO2: (!) 92% (!) 93% 95% (!) 91%   Weight:       Height:              Body mass index is 31.17 kg/m².      PHYSICAL EXAM:  GENERAL APPEARANCE: alert and cooperative, and appears to be in no acute distress.  HEENT:     HEAD: NC/AT     EYES: PERRL, EOMI.  Vision is grossly intact.  NECK: Neck supple, non-tender without LAD, masses or thyromegaly.  CARDIAC: There is no peripheral edema, cyanosis or pallor.   LUNGS: Clear to auscultation and percussion without rales or wheezing  ABDOMEN: Non-distended. No guarding.  MSK: No joint erythema or tenderness.   EXTREMITIES: No significant deformity or joint abnormality. No edema.   NEUROLOGICAL: CN II-XII grossly intact.   SKIN: Skin normal color, texture and turgor with no lesions or  eruptions.  PSYCHIATRIC: Oriented. No tangential speech. No Hyperactive features.        Recent Results (from the past 24 hour(s))   Basic metabolic panel    Collection Time: 08/10/23  4:02 PM   Result Value Ref Range    Sodium 134 (L) 136 - 145 mmol/L    Potassium 3.5 3.5 - 5.1 mmol/L    Chloride 96 95 - 110 mmol/L    CO2 28 23 - 29 mmol/L    Glucose 121 (H) 70 - 110 mg/dL    BUN 56 (H) 8 - 23 mg/dL    Creatinine 3.9 (H) 0.5 - 1.4 mg/dL    Calcium 8.5 (L) 8.7 - 10.5 mg/dL    Anion Gap 10 8 - 16 mmol/L    eGFR 16 (A) >60 mL/min/1.73 m^2   Magnesium    Collection Time: 08/10/23  4:02 PM   Result Value Ref Range    Magnesium 1.6 1.6 - 2.6 mg/dL   Phosphorus    Collection Time: 08/11/23  3:26 AM   Result Value Ref Range    Phosphorus 3.7 2.7 - 4.5 mg/dL   Magnesium    Collection Time: 08/11/23  3:26 AM   Result Value Ref Range    Magnesium 1.9 1.6 - 2.6 mg/dL   Comprehensive Metabolic Panel    Collection Time: 08/11/23  3:26 AM   Result Value Ref Range    Sodium 136 136 - 145 mmol/L    Potassium 3.5 3.5 - 5.1 mmol/L    Chloride 97 95 - 110 mmol/L    CO2 30 (H) 23 - 29 mmol/L    Glucose 109 70 - 110 mg/dL    BUN 50 (H) 8 - 23 mg/dL    Creatinine 3.5 (H) 0.5 - 1.4 mg/dL    Calcium 8.5 (L) 8.7 - 10.5 mg/dL    Total Protein 5.6 (L) 6.0 - 8.4 g/dL    Albumin 1.8 (L) 3.5 - 5.2 g/dL    Total Bilirubin 0.9 0.1 - 1.0 mg/dL    Alkaline Phosphatase 73 55 - 135 U/L    AST 47 (H) 10 - 40 U/L    ALT 48 (H) 10 - 44 U/L    eGFR 19 (A) >60 mL/min/1.73 m^2    Anion Gap 9 8 - 16 mmol/L   CBC Auto Differential    Collection Time: 08/11/23  3:26 AM   Result Value Ref Range    WBC 11.40 3.90 - 12.70 K/uL    RBC 3.96 (L) 4.60 - 6.20 M/uL    Hemoglobin 12.0 (L) 14.0 - 18.0 g/dL    Hematocrit 36.0 (L) 40.0 - 54.0 %    MCV 91 82 - 98 fL    MCH 30.3 27.0 - 31.0 pg    MCHC 33.3 32.0 - 36.0 g/dL    RDW 14.0 11.5 - 14.5 %    Platelets 195 150 - 450 K/uL    MPV 9.7 9.2 - 12.9 fL    Immature Granulocytes 0.5 0.0 - 0.5 %    Gran # (ANC) 8.3 (H) 1.8 -  7.7 K/uL    Immature Grans (Abs) 0.06 (H) 0.00 - 0.04 K/uL    Lymph # 1.6 1.0 - 4.8 K/uL    Mono # 1.3 (H) 0.3 - 1.0 K/uL    Eos # 0.1 0.0 - 0.5 K/uL    Baso # 0.04 0.00 - 0.20 K/uL    nRBC 0 0 /100 WBC    Gran % 73.0 38.0 - 73.0 %    Lymph % 13.6 (L) 18.0 - 48.0 %    Mono % 11.4 4.0 - 15.0 %    Eosinophil % 1.1 0.0 - 8.0 %    Basophil % 0.4 0.0 - 1.9 %    Differential Method Automated        Microbiology Results (last 7 days)       Procedure Component Value Units Date/Time    Blood culture [[id number]] Collected: 08/08/23 0819    Order Status: Completed Specimen: Blood Updated: 08/10/23 0903     Blood Culture, Routine No Growth to date      No Growth to date      No Growth to date    Blood culture [[id number]] Collected: 08/08/23 0807    Order Status: Completed Specimen: Blood from Peripheral, Right Hand Updated: 08/10/23 0903     Blood Culture, Routine No Growth to date      No Growth to date      No Growth to date             Imaging Results              US Retroperitoneal Complete (Final result)  Result time 08/07/23 03:33:50      Final result by Marquise Humphries MD (08/07/23 03:33:50)                   Impression:      There is no evidence for hydronephrosis bilaterally.    Mild elevated resistive index of the left kidney measuring 0.72 can be seen with medical renal disease.    Nonobstructing calculus of the right kidney.    Renal cysts are noted.      Electronically signed by: Marquise Humphries  Date:    08/07/2023  Time:    03:33               Narrative:    EXAMINATION:  US RETROPERITONEAL COMPLETE    CLINICAL HISTORY:  acute renal failure;    TECHNIQUE:  Ultrasound of the kidneys and urinary bladder was performed including color flow and Doppler evaluation of the kidneys.    COMPARISON:  None.    FINDINGS:  The right kidney measures approximately 11.1 cm in length.  There is no evidence for hydronephrosis.  Color imaging demonstrates flow to the right kidney with a resistive index measurement of 0.60.  At the  lower pole of the right kidney there is a 4.1 mm hyperechoic focus that may relate to a small nonobstructing calculus.  At the upper pole of the right kidney there is a hypoechoic finding measuring approximately 11 x 6.9 by 13.4 mm with posterior wall enhancement and increased through transmission, most consistent with a small cyst.    The left kidney measures approximately 13 cm in length.  There is no evidence for hydronephrosis.  Color imaging demonstrates flow to the left kidney with a resistive index measurement of 0.72.  At the mid to upper pole of the left kidney there is an exophytic hypoechoic finding measuring approximately 3.2 x 2.6 x 2.7 cm in size, consistent with a cyst.    The urinary bladder is identified measuring approximately 5 x 7.3 x 5.4 cm with a calculated volume of 102.42 mL.  Postvoid imaging is not available.    The prostate measures approximately 3.2 x 3 x 4.4 cm in size with a calculated volume of 22 cc.                                       X-Ray Chest AP Portable (Final result)  Result time 08/07/23 00:42:12      Final result by Marquise Humphries MD (08/07/23 00:42:12)                   Impression:      There is no radiographic evidence for acute intrathoracic process.      Electronically signed by: Marquise Humphries  Date:    08/07/2023  Time:    00:42               Narrative:    EXAMINATION:  XR CHEST AP PORTABLE    CLINICAL HISTORY:  Hypotension, unspecified    TECHNIQUE:  Single frontal view of the chest was performed.    COMPARISON:  Chest radiograph March 13, 2021    FINDINGS:  AP portable chest radiographic examination is submitted.  There is mild diminished depth of inspiration.  When accounting for position and technique and depth of inspiration, the appearance of the cardiomediastinal silhouette is stable.  Aortic atherosclerotic change noted.    There is no evidence for confluent infiltrate or consolidation, significant pleural effusion or pneumothorax.    The osseous  structures demonstrate chronic change.                                             Assessment/Plan:      * ARF (acute renal failure)  Patient with acute kidney injury/acute renal failure likely due to pre-renal azotemia due to dehydration LAM is currently worsening. Baseline creatinine unknown - Labs reviewed- Renal function/electrolytes with CrCl cannot be calculated (Unknown ideal weight.). according to latest data. Monitor urine output and serial BMP and adjust therapy as needed. Avoid nephrotoxins and renally dose meds for GFR listed above.    -Presented with LAM  -Cr on admit 7.4  -Baseline Cr unknown, but Cr 1.1 in 08/2022  -Suspect due to decreased PO intake and home meds (amolidpine-olmarsartan and chlorthalidone)  -Renal US with no obstruction. Findings consistent with CKD  -IVF  -Nephrology consulted: check FENA, pthi, and uric acid  -Avoid nephrotoxins, renal dose all meds.   -Monitor Is/Os  -Monitor       Sepsis  This patient does not have evidence of infective focus  My overall impression is sepsis.  Source: Unknown  Antibiotics given-   Antibiotics (72h ago, onward)      Start     Stop Route Frequency Ordered    08/10/23 2228  ceFEPIme (MAXIPIME) 1 g in dextrose 5 % in water (D5W) 100 mL IVPB (MB+)         -- IV Every 24 hours (non-standard times) 08/10/23 0757    08/08/23 1015  doxycycline tablet 100 mg         -- Oral Every 12 hours 08/08/23 0901    08/08/23 0900  mupirocin 2 % ointment         08/13/23 0859 Nasl 2 times daily 08/08/23 0725          Latest lactate reviewed-  Recent Labs   Lab 08/08/23  0807   LACTATE 0.8     Organ dysfunction indicated by Acute kidney injury and New afib    Fluid challenge Actual Body weight- Patient will receive 30ml/kg actual body weight to calculate fluid bolus for treatment of septic shock.     Post- resuscitation assessment Yes Perfusion exam was performed within 6 hours of septic shock presentation after bolus shows Adequate tissue perfusion assessed by  non-invasive monitoring       Will ordered but not started, responding to fluids Pressors- Levophed for MAP of 65  Source control achieved by: Abx and fluids    Patient admitted with hypotension and leukocytosis, and feeling unwell for the past couple weeks.  Patient's blood pressure again low today at 70/49, concerns he could bacteremia or another infection.  No skin infections appreciated. Urine clean. No consolidation on CXR.  Initial presentation of AFib (and ARF) often in the setting of infection due to catecholamine release and hypotension, as well.    He is afebrile, however this does not exclude infection.    Blood cultures taken.  , also concerning for an ongoing inflammatory issue/infection.    Given acute renal failure, will hold off on vancomycin for now.    Will initiate IV cefepime and oral doxycycline.   Will await cultures to tailor antibiotics or deescalate.    Unclear source, WBC normalized today 8/11, will transition to oral at d/c tomorrow to compete 7d course     A-fib  Patient with Paroxysmal (<7 days) atrial fibrillation which is uncontrolled currently with Amio initially but bad lungs, cannot use long term.. Patient is currently in atrial fibrillation.[id number] Score: The patient doesn't have any registry metric data available. HASBLED Score: ?. Anticoagulation indicated. Anticoagulation done with Apix.   Patient DCCV x2, now on sotalol- will need home script at d/c  In NSR now    Class 1 obesity with body mass index (BMI) of 31.0 to 31.9 in adult  Body mass index is 31.17 kg/m². Morbid obesity complicates all aspects of disease management from diagnostic modalities to treatment. Weight loss encouraged and health benefits explained to patient.         Leukocytosis  -WBC# 16.07 on admission w/hypotension. Pt afebrile. CXR with no acute process.   -Pt with leukocytosis, suspect reactive. Low suspicion for infection at this time.   -Hypotension improved with fluids  -Continue IVF  -Echo  ordered  -Will monitor off abx at this time   -repeat labs in the AM     Hypotension  -BP as low as 63/41 in the ED  -Suspect due to pt continuing home meds (amolidpine-olmarsartan and chlorthalidone) in setting of LAM and decreased PO intake/dehydration  -Hypotension improved with fluids  -Continue IVF  -Pt with leukocytosis, suspect reactive. Low suspicion for infection at this time.   -Echo ordered  -Hold home BP meds as above  -Monitor     Dehydration  -Continue IVF as above  -See plan for LAM    Tobacco abuse  - Patient was counseled regarding smoking for 3-10 minutes.  - Encourage smoking cessation daily  - NRT offered         Heroin abuse  Negative on admission, but since IVDU hx higher risk for bacteremia        VTE Risk Mitigation (From admission, onward)           Ordered     apixaban tablet 5 mg  2 times daily         08/08/23 1634     IP VTE LOW RISK PATIENT  Once         08/07/23 0211     Place sequential compression device  Until discontinued         08/07/23 0211                    Discharge Planning   ELODIA:      Code Status: Full Code   Is the patient medically ready for discharge?:     Reason for patient still in hospital (select all that apply): Patient trending condition and Treatment  Discharge Plan A: Home with family            Critical care time spent on the evaluation and treatment of severe organ dysfunction, review of pertinent labs and imaging studies, discussions with consulting providers and discussions with patient/family: 35 minutes.      Glynn Blunt MD  Department of Hospital Medicine   [facility] - Intensive Care

## 2023-08-11 NOTE — ASSESSMENT & PLAN NOTE
This patient does not have evidence of infective focus  My overall impression is sepsis.  Source: Unknown  Antibiotics given-   Antibiotics (72h ago, onward)    Start     Stop Route Frequency Ordered    08/10/23 2228  ceFEPIme (MAXIPIME) 1 g in dextrose 5 % in water (D5W) 100 mL IVPB (MB+)         -- IV Every 24 hours (non-standard times) 08/10/23 0757    08/08/23 1015  doxycycline tablet 100 mg         -- Oral Every 12 hours 08/08/23 0901    08/08/23 0900  mupirocin 2 % ointment         08/13/23 0859 Nasl 2 times daily 08/08/23 0725        Latest lactate reviewed-  Recent Labs   Lab 08/08/23  0807   LACTATE 0.8     Organ dysfunction indicated by Acute kidney injury and New afib    Fluid challenge Actual Body weight- Patient will receive 30ml/kg actual body weight to calculate fluid bolus for treatment of septic shock.     Post- resuscitation assessment Yes Perfusion exam was performed within 6 hours of septic shock presentation after bolus shows Adequate tissue perfusion assessed by non-invasive monitoring       Will ordered but not started, responding to fluids Pressors- Levophed for MAP of 65  Source control achieved by: Abx and fluids    · Patient admitted with hypotension and leukocytosis, and feeling unwell for the past couple weeks.  Patient's blood pressure again low today at 70/49, concerns he could bacteremia or another infection.  No skin infections appreciated. Urine clean. No consolidation on CXR.  · Initial presentation of AFib (and ARF) often in the setting of infection due to catecholamine release and hypotension, as well.    · He is afebrile, however this does not exclude infection.    · Blood cultures taken.  , also concerning for an ongoing inflammatory issue/infection.    · Given acute renal failure, will hold off on vancomycin for now.    · Will initiate IV cefepime and oral doxycycline.   · Will await cultures to tailor antibiotics or deescalate.    · Unclear source, WBC normalized today  8/11, will transition to oral at d/c tomorrow to compete 7d course

## 2023-08-11 NOTE — NURSING
[facility] [place]  Nurses Note -- 4 Eyes      8/11/2023       Skin assessed on: Q Shift      [x] No Pressure Injuries Present    []Prevention Measures Documented    [] Yes LDA  for Pressure Injury Previously documented     [] Yes New Pressure Injury Discovered   [] LDA for New Pressure Injury Added      Attending RN:  Calista Adair RN     Second RN:  GAURANG Crowley

## 2023-08-11 NOTE — SUBJECTIVE & OBJECTIVE
Review of Systems   Gastrointestinal:  Negative for melena.   Genitourinary:  Negative for hematuria.     Objective:     Vital Signs (Most Recent):  Temp: 98.4 °F (36.9 °C) (08/11/23 0701)  Pulse: (!) 55 (08/11/23 0800)  Resp: (!) 22 (08/11/23 0800)  BP: 129/61 (08/11/23 0800)  SpO2: 98 % (08/11/23 0800) Vital Signs (24h Range):  Temp:  [97 °F (36.1 °C)-99.9 °F (37.7 °C)] 98.4 °F (36.9 °C)  Pulse:  [] 55  Resp:  [13-35] 22  SpO2:  [91 %-98 %] 98 %  BP: (101-153)/(54-85) 129/61     Weight: 93 kg (205 lb)  Body mass index is 31.17 kg/m².     SpO2: 98 %         Intake/Output Summary (Last 24 hours) at 8/11/2023 0824  Last data filed at 8/11/2023 0800  Gross per 24 hour   Intake 3155.82 ml   Output 3875 ml   Net -719.18 ml       Lines/Drains/Airways       Peripheral Intravenous Line  Duration                  Peripheral IV - Single Lumen 08/09/23 1900 20 G Left;Posterior;Proximal Forearm 1 day         Peripheral IV - Single Lumen 08/09/23 1900 22 G Distal;Posterior;Right Forearm 1 day                       Physical Exam  Constitutional:       General: He is not in acute distress.     Appearance: He is well-developed. He is obese. He is not ill-appearing, toxic-appearing or diaphoretic.   HENT:      Head: Normocephalic and atraumatic.   Eyes:      General: No scleral icterus.     Extraocular Movements: Extraocular movements intact.      Conjunctiva/sclera: Conjunctivae normal.      Pupils: Pupils are equal, round, and reactive to light.   Neck:      Thyroid: No thyromegaly.      Vascular: No JVD.      Trachea: No tracheal deviation.   Cardiovascular:      Rate and Rhythm: Regular rhythm. Bradycardia present.      Heart sounds: S1 normal and S2 normal. Murmur heard.      Systolic murmur is present with a grade of 2/6.      No friction rub. No gallop.   Pulmonary:      Effort: Pulmonary effort is normal. No respiratory distress.      Breath sounds: Normal breath sounds. No stridor. No wheezing, rhonchi or rales.    Chest:      Chest wall: No tenderness.   Abdominal:      General: There is no distension.      Palpations: Abdomen is soft.   Musculoskeletal:         General: No swelling or tenderness. Normal range of motion.      Cervical back: Normal range of motion and neck supple. No rigidity.      Right lower leg: No edema.      Left lower leg: No edema.   Skin:     General: Skin is warm and dry.      Coloration: Skin is not jaundiced.      Findings: No rash.   Neurological:      General: No focal deficit present.      Mental Status: He is alert and oriented to person, place, and time.      Cranial Nerves: No cranial nerve deficit.   Psychiatric:         Mood and Affect: Mood normal.         Behavior: Behavior normal.            Current Medications:   albuterol-ipratropium  3 mL Nebulization Q6H    allopurinoL  100 mg Oral Daily    apixaban  5 mg Oral BID    aspirin  81 mg Oral Daily    atorvastatin  40 mg Oral QHS    ceFEPime (MAXIPIME) IVPB  1 g Intravenous Q24H    doxycycline  100 mg Oral Q12H    famotidine  20 mg Oral Daily    metoprolol  5 mg Intravenous Once    mupirocin   Nasal BID    nicotine  1 patch Transdermal Daily    sotaloL  80 mg Oral BID      amiodarone in dextrose 5% Stopped (08/10/23 1148)    lactated ringers 125 mL/hr at 08/11/23 0800     aluminum-magnesium hydroxide-simethicone, dextrose 50%, dextrose 50%, glucagon (human recombinant), glucose, glucose, naloxone, ondansetron, sodium chloride 0.9%    Laboratory (all labs reviewed):  CBC:  Recent Labs   Lab 08/07/23  0716 08/07/23  1936 08/08/23  0522 08/09/23  0601 08/11/23  0326   WBC 16.07 H 15.37 H 13.16 H  13.16 H 13.06 H 11.40   Hemoglobin 12.9 L 13.0 L 10.8 L  10.8 L 12.3 L 12.0 L   Hematocrit 37.6 L 37.3 L 31.6 L  31.6 L 35.8 L 36.0 L   Platelets 198 186 169  169 180 195       CHEMISTRIES:  Recent Labs   Lab 03/14/21  0438 03/15/21  0410 03/17/21  1110 08/08/23  0522 08/09/23  0601 08/10/23  0603 08/10/23  1602 08/11/23  0326   Glucose 99 81    < > 108 107 114 H 121 H 109   Sodium 138 137   < > 132 L 134 L 134 L 134 L 136   Potassium 3.7 4.1   < > 3.4 L 3.6 2.9 L 3.5 3.5   BUN 13 18   < > 84 H 80 H 63 H 56 H 50 H   Creatinine 0.8 0.8   < > 6.9 H 6.1 H 4.6 H 3.9 H 3.5 H   eGFR if non  >60 >60  --   --   --   --   --   --    eGFR  --   --    < > 8 A 10 A 13 A 16 A 19 A   Calcium 8.6 L 8.7   < > 8.7 8.5 L 8.5 L 8.5 L 8.5 L   Magnesium  --   --    < > 1.9 1.6 1.3 L 1.6 1.9    < > = values in this interval not displayed.       CARDIAC BIOMARKERS:  Recent Labs   Lab 03/13/21  1816 08/07/23  1202   CPK  --  58   Troponin I 0.016  --        COAGS:  Recent Labs   Lab 12/24/20  1236 03/13/21  1506 08/07/23  1936   INR 1.0 1.0 1.3 H       LIPIDS/LFTS:  Recent Labs   Lab 03/15/21  0410 03/17/21  1110 07/22/21  1931 08/29/22  1224 08/07/23  1202 08/11/23  0326   Cholesterol 130  --   --   --   --   --    Triglycerides 98  --   --   --   --   --    HDL 35 L  --   --   --   --   --    LDL Cholesterol 75.4  --   --   --   --   --    Non-HDL Cholesterol 95  --   --   --   --   --    AST  --  26 98 H 126 H 66 H  66 H 47 H   ALT  --  16 117 H 202 H 73 H  73 H 48 H       BNP:        TSH:  Recent Labs   Lab 03/14/21  0438   TSH 3.152       Free T4:        Diagnostic Results:  ECG (personally reviewed and interpreted tracing(s)):  8/10/23 1037 , NSSTTW changes  8/11/23 0837 SR 56, QTc 528 (baseline 478)    KASHIF/DCCV 8/8/23    Left Ventricle: Normal wall motion. There is normal systolic function with a visually estimated ejection fraction of 55 - 60%.    Left Atrium: Left atrium is moderately dilated. There is no thrombus in the left atrial appendage.    Right Ventricle: Right ventricle was not well visualized due to poor acoustic window.    Aortic Valve: There is mild aortic valve sclerosis.    Mitral Valve: There is mild regurgitation.    Tricuspid Valve: There is mild regurgitation.    The pre-cardioversion rhythm was atrial fibrillation. A 200 J  synchronized cardioversion was successfully performed with restoration of normal sinus rhythm. The post-cardioversion rhythm was normal sinus rhythm.    Echo: 8/7/23    Left Ventricle: The left ventricle is normal in size. Mildly increased wall thickness. Normal wall motion. There is normal systolic function with a visually estimated ejection fraction of 65 - 70%. Grade I diastolic dysfunction.    Left Atrium: Left atrium is severely dilated.    Right Ventricle: Normal right ventricular cavity size. Systolic function is normal.    Right Atrium: Right atrium is moderately dilated.    Aortic Valve: There is aortic valve sclerosis.    Tricuspid Valve: There is mild regurgitation.    Pulmonary Artery: The estimated pulmonary artery systolic pressure is 23 mmHg.    IVC/SVC: Normal venous pressure at 3 mmHg.

## 2023-08-11 NOTE — NURSING
[facility]  Nurses Note -- 4 Eyes      8/10/2023       Skin assessed on: Q Shift      [] No Pressure Injuries Present    []Prevention Measures Documented    [] Yes LDA  for Pressure Injury Previously documented     [] Yes New Pressure Injury Discovered   [] LDA for New Pressure Injury Added      Attending RN:  Fanny Juarez RN     Second RN:  Serina Webster RN

## 2023-08-11 NOTE — PLAN OF CARE
Problem: Fatigue  Goal: Improved Activity Tolerance  Outcome: Ongoing, Progressing     Problem: Fluid and Electrolyte Imbalance (Acute Kidney Injury/Impairment)  Goal: Fluid and Electrolyte Balance  Outcome: Ongoing, Progressing     Problem: Oral Intake Inadequate (Acute Kidney Injury/Impairment)  Goal: Optimal Nutrition Intake  Outcome: Ongoing, Progressing     Problem: Renal Function Impairment (Acute Kidney Injury/Impairment)  Goal: Effective Renal Function  Outcome: Ongoing, Progressing     Problem: Fall Injury Risk  Goal: Absence of Fall and Fall-Related Injury  Outcome: Ongoing, Progressing

## 2023-08-11 NOTE — PLAN OF CARE
Problem: Physical Therapy  Goal: Physical Therapy Goal  Description: Goals to be met by: 23     Patient will increase functional independence with mobility by performin. Sit to stand transfer with Modified [place]  2. Gait  x 150 feet with Modified [place] .   3. Lower extremity exercise program x10 reps per handout, with independence    Outcome: Ongoing, Progressing   Initial PT evaluation performed today.  Pt could benefit from skilled PT services 3-5x/wk in order to maximize function prior to D/C.  Ongoing assessment pending pt progress for disposition and DME.  Most likely home with no needs.

## 2023-08-11 NOTE — HOSPITAL COURSE
Interval Hx: pt seen in ICU, no cp/sob, remains in SR.    Tele: SR 60s (personally reviewed and interpreted)

## 2023-08-11 NOTE — PLAN OF CARE
Problem: Renal Function Impairment (Acute Kidney Injury/Impairment)  Goal: Effective Renal Function  Outcome: Ongoing, Progressing     Problem: Fall Injury Risk  Goal: Absence of Fall and Fall-Related Injury  Outcome: Ongoing, Progressing  Intervention: Promote Injury-Free Environment  Flowsheets (Taken 8/11/2023 1094)  Safety Promotion/Fall Prevention:   assistive device/personal item within reach   nonskid shoes/socks when out of bed   side rails raised x 2

## 2023-08-11 NOTE — PROGRESS NOTES
[facility] [place] [facility]  Cardiology  Progress Note    Patient Name: Vivek Whitman  MRN: [id number]  Admission Date: 8/6/2023  Hospital Length of Stay: 4 days  Code Status: Full Code   Attending Physician: Glynn Blunt MD   Primary Care Physician: Racheal, Primary Doctor  Expected Discharge Date:   Principal Problem:ARF (acute renal failure)    Subjective:     Interval Hx: pt seen in ICU, case d/w RN and Alex Claudio and Nathaniel (EPS).  No cp/sob.  Importance of med adherence and abstinence from drugs recommended.    Tele: SR 59 (personally reviewed and interpreted)          Review of Systems   Gastrointestinal:  Negative for melena.   Genitourinary:  Negative for hematuria.     Objective:     Vital Signs (Most Recent):  Temp: 98.4 °F (36.9 °C) (08/11/23 0701)  Pulse: (!) 55 (08/11/23 0800)  Resp: (!) 22 (08/11/23 0800)  BP: 129/61 (08/11/23 0800)  SpO2: 98 % (08/11/23 0800) Vital Signs (24h Range):  Temp:  [97 °F (36.1 °C)-99.9 °F (37.7 °C)] 98.4 °F (36.9 °C)  Pulse:  [] 55  Resp:  [13-35] 22  SpO2:  [91 %-98 %] 98 %  BP: (101-153)/(54-85) 129/61     Weight: 93 kg (205 lb)  Body mass index is 31.17 kg/m².     SpO2: 98 %         Intake/Output Summary (Last 24 hours) at 8/11/2023 0824  Last data filed at 8/11/2023 0800  Gross per 24 hour   Intake 3155.82 ml   Output 3875 ml   Net -719.18 ml       Lines/Drains/Airways       Peripheral Intravenous Line  Duration                  Peripheral IV - Single Lumen 08/09/23 1900 20 G Left;Posterior;Proximal Forearm 1 day         Peripheral IV - Single Lumen 08/09/23 1900 22 G Distal;Posterior;Right Forearm 1 day                       Physical Exam  Constitutional:       General: He is not in acute distress.     Appearance: He is well-developed. He is obese. He is not ill-appearing, toxic-appearing or diaphoretic.   HENT:      Head: Normocephalic and atraumatic.   Eyes:      General: No scleral icterus.     Extraocular Movements: Extraocular movements intact.       Conjunctiva/sclera: Conjunctivae normal.      Pupils: Pupils are equal, round, and reactive to light.   Neck:      Thyroid: No thyromegaly.      Vascular: No JVD.      Trachea: No tracheal deviation.   Cardiovascular:      Rate and Rhythm: Regular rhythm. Bradycardia present.      Heart sounds: S1 normal and S2 normal. Murmur heard.      Systolic murmur is present with a grade of 2/6.      No friction rub. No gallop.   Pulmonary:      Effort: Pulmonary effort is normal. No respiratory distress.      Breath sounds: Normal breath sounds. No stridor. No wheezing, rhonchi or rales.   Chest:      Chest wall: No tenderness.   Abdominal:      General: There is no distension.      Palpations: Abdomen is soft.   Musculoskeletal:         General: No swelling or tenderness. Normal range of motion.      Cervical back: Normal range of motion and neck supple. No rigidity.      Right lower leg: No edema.      Left lower leg: No edema.   Skin:     General: Skin is warm and dry.      Coloration: Skin is not jaundiced.      Findings: No rash.   Neurological:      General: No focal deficit present.      Mental Status: He is alert and oriented to person, place, and time.      Cranial Nerves: No cranial nerve deficit.   Psychiatric:         Mood and Affect: Mood normal.         Behavior: Behavior normal.            Current Medications:   albuterol-ipratropium  3 mL Nebulization Q6H    allopurinoL  100 mg Oral Daily    apixaban  5 mg Oral BID    aspirin  81 mg Oral Daily    atorvastatin  40 mg Oral QHS    ceFEPime (MAXIPIME) IVPB  1 g Intravenous Q24H    doxycycline  100 mg Oral Q12H    famotidine  20 mg Oral Daily    metoprolol  5 mg Intravenous Once    mupirocin   Nasal BID    nicotine  1 patch Transdermal Daily    sotaloL  80 mg Oral BID      amiodarone in dextrose 5% Stopped (08/10/23 1148)    lactated ringers 125 mL/hr at 08/11/23 0800     aluminum-magnesium hydroxide-simethicone, dextrose 50%, dextrose 50%, glucagon  (human recombinant), glucose, glucose, naloxone, ondansetron, sodium chloride 0.9%    Laboratory (all labs reviewed):  CBC:  Recent Labs   Lab 08/07/23  0716 08/07/23  1936 08/08/23  0522 08/09/23  0601 08/11/23  0326   WBC 16.07 H 15.37 H 13.16 H  13.16 H 13.06 H 11.40   Hemoglobin 12.9 L 13.0 L 10.8 L  10.8 L 12.3 L 12.0 L   Hematocrit 37.6 L 37.3 L 31.6 L  31.6 L 35.8 L 36.0 L   Platelets 198 186 169  169 180 195       CHEMISTRIES:  Recent Labs   Lab 03/14/21  0438 03/15/21  0410 03/17/21  1110 08/08/23  0522 08/09/23  0601 08/10/23  0603 08/10/23  1602 08/11/23  0326   Glucose 99 81   < > 108 107 114 H 121 H 109   Sodium 138 137   < > 132 L 134 L 134 L 134 L 136   Potassium 3.7 4.1   < > 3.4 L 3.6 2.9 L 3.5 3.5   BUN 13 18   < > 84 H 80 H 63 H 56 H 50 H   Creatinine 0.8 0.8   < > 6.9 H 6.1 H 4.6 H 3.9 H 3.5 H   eGFR if non  >60 >60  --   --   --   --   --   --    eGFR  --   --    < > 8 A 10 A 13 A 16 A 19 A   Calcium 8.6 L 8.7   < > 8.7 8.5 L 8.5 L 8.5 L 8.5 L   Magnesium  --   --    < > 1.9 1.6 1.3 L 1.6 1.9    < > = values in this interval not displayed.       CARDIAC BIOMARKERS:  Recent Labs   Lab 03/13/21  1816 08/07/23  1202   CPK  --  58   Troponin I 0.016  --        COAGS:  Recent Labs   Lab 12/24/20  1236 03/13/21  1506 08/07/23  1936   INR 1.0 1.0 1.3 H       LIPIDS/LFTS:  Recent Labs   Lab 03/15/21  0410 03/17/21  1110 07/22/21  1931 08/29/22  1224 08/07/23  1202 08/11/23  0326   Cholesterol 130  --   --   --   --   --    Triglycerides 98  --   --   --   --   --    HDL 35 L  --   --   --   --   --    LDL Cholesterol 75.4  --   --   --   --   --    Non-HDL Cholesterol 95  --   --   --   --   --    AST  --  26 98 H 126 H 66 H  66 H 47 H   ALT  --  16 117 H 202 H 73 H  73 H 48 H       BNP:        TSH:  Recent Labs   Lab 03/14/21  0438   TSH 3.152       Free T4:        Diagnostic Results:  ECG (personally reviewed and interpreted tracing(s)):  8/10/23 1037 , NSSTTW  changes  8/11/23 0837 SR 56, QTc 528 (baseline 478)    KASHIF/DCCV 8/8/23    Left Ventricle: Normal wall motion. There is normal systolic function with a visually estimated ejection fraction of 55 - 60%.    Left Atrium: Left atrium is moderately dilated. There is no thrombus in the left atrial appendage.    Right Ventricle: Right ventricle was not well visualized due to poor acoustic window.    Aortic Valve: There is mild aortic valve sclerosis.    Mitral Valve: There is mild regurgitation.    Tricuspid Valve: There is mild regurgitation.    The pre-cardioversion rhythm was atrial fibrillation. A 200 J synchronized cardioversion was successfully performed with restoration of normal sinus rhythm. The post-cardioversion rhythm was normal sinus rhythm.    Echo: 8/7/23    Left Ventricle: The left ventricle is normal in size. Mildly increased wall thickness. Normal wall motion. There is normal systolic function with a visually estimated ejection fraction of 65 - 70%. Grade I diastolic dysfunction.    Left Atrium: Left atrium is severely dilated.    Right Ventricle: Normal right ventricular cavity size. Systolic function is normal.    Right Atrium: Right atrium is moderately dilated.    Aortic Valve: There is aortic valve sclerosis.    Tricuspid Valve: There is mild regurgitation.    Pulmonary Artery: The estimated pulmonary artery systolic pressure is 23 mmHg.    IVC/SVC: Normal venous pressure at 3 mmHg.        Assessment and Plan:     * ARF (acute renal failure)  Mgmt per IM/neph    Paroxysmal atrial fibrillation  Now back in SR after KASHIF/DCCV and repeat DCCV.  Given LAM and prolonged QTc, will stop sotalol.  Cont DOAC  Check Priscilla MPI today, if neg will start flecainide.  Amio as contingency.  Will hold off for 24 hrs and repeat EKG to see if QT has normalized.    Heroin abuse  Abstinence recommended    Tobacco abuse  Cessation recommended      Sepsis  Mgmt per IM    Hypotension  Likely secondry to  dehydration. Continue to monitor response to iv hydration. Hold anti hypertensives. Avoid nephrotoxic agents    Dehydration  Iv hydration        VTE Risk Mitigation (From admission, onward)         Ordered     apixaban tablet 5 mg  2 times daily         08/08/23 1634     IP VTE LOW RISK PATIENT  Once         08/07/23 0211     Place sequential compression device  Until discontinued         08/07/23 0211                Win Caceres MD  Cardiology  [facility] - Intensive Care

## 2023-08-11 NOTE — ASSESSMENT & PLAN NOTE
Patient with Paroxysmal (<7 days) atrial fibrillation which is uncontrolled currently with Amio initially but bad lungs, cannot use long term.. Patient is currently in atrial fibrillation.[id number] Score: The patient doesn't have any registry metric data available. HASBLED Score: ?. Anticoagulation indicated. Anticoagulation done with Apix.   · Patient DCCV x2, now on sotalol- will need home script at d/c  · In NSR now

## 2023-08-11 NOTE — PLAN OF CARE
Problem: Occupational Therapy  Goal: Occupational Therapy Goal  Description: Goals to be met by: 8/11/23     Patient will increase functional independence with ADLs by performing:    UE Dressing with [place]ie.  LE Dressing with Dixie.  Grooming while standing at sink with [place].  Toileting from toilet with [place] for hygiene and clothing management.   Sitting at edge of bed x30 minutes with [place].  Toilet transfer to toilet with [place].  Upper extremity exercise program x10 reps per handout, with independence  .    Patient seen by occupational therapy for initial evaluation, so see notes for more information. Patient uncooperative and agitated initially threatening to leave AMA. RN and PT came in and patient calmed to participate minimally in evaluation.  Occupational therapy recommends no HH and no equipment. Occupational therapy to see 2x/wk while still in acute care.

## 2023-08-11 NOTE — PROGRESS NOTES
Awake alert oriented nad    Feeling better after cardioversion     Denies CNS ENT CP GI  RHEUM OR DERM SX  Past Medical History:   Diagnosis Date    Hypertension      Past Surgical History:   Procedure Laterality Date    CARDIOVERSION N/A 8/8/2023    Procedure: Cardioversion;  Surgeon: Dank Claudio MD;  Location: [facility] CATH LAB;  Service: Cardiology;  Laterality: N/A;    CARDIOVERSION N/A 8/10/2023    Procedure: Cardioversion;  Surgeon: Dank Claudio MD;  Location: [facility] CATH LAB;  Service: Cardiology;  Laterality: N/A;    HERNIA REPAIR      TRANSESOPHAGEAL ECHOCARDIOGRAM WITH POSSIBLE CARDIOVERSION (KASHIF W/ POSS CARDIOVERSION) N/A 8/8/2023    Procedure: Transesophageal echo (KASHIF) intra-procedure log documentation;  Surgeon: Dank Claudio MD;  Location: [facility] CATH LAB;  Service: Cardiology;  Laterality: N/A;    TRANSESOPHAGEAL ECHOCARDIOGRAPHY N/A 8/8/2023    Procedure: ECHOCARDIOGRAM, TRANSESOPHAGEAL;  Surgeon: Dank Claudio MD;  Location: [facility] CATH LAB;  Service: Cardiology;  Laterality: N/A;    TREATMENT OF CARDIAC ARRHYTHMIA N/A 8/10/2023    Procedure: Cardioversion or Defibrillation;  Surgeon: Dank Claudio MD;  Location: [facility] CATH LAB;  Service: Cardiology;  Laterality: N/A;     Social History     Socioeconomic History    Marital status: Single   Tobacco Use    Smoking status: Every Day     Current packs/day: 1.00     Types: Cigarettes   Substance and Sexual Activity    Alcohol use: Yes     Alcohol/week: 12.0 standard drinks of alcohol     Types: 12 Cans of beer per week    Drug use: No     History reviewed. No pertinent family history.,  Review of patient's allergies indicates:  No Known Allergies    Current Facility-Administered Medications   Medication    albuterol-ipratropium 2.5 mg-0.5 mg/3 mL nebulizer solution 3 mL    allopurinoL tablet 100 mg    aluminum-magnesium hydroxide-simethicone 200-200-20 mg/5 mL suspension 30 mL    apixaban tablet 5 mg    aspirin EC tablet 81 mg    atorvastatin  tablet 40 mg    ceFEPIme (MAXIPIME) 1 g in dextrose 5 % in water (D5W) 100 mL IVPB (MB+)    dextrose 50% injection 12.5 g    dextrose 50% injection 25 g    doxycycline tablet 100 mg    famotidine tablet 20 mg    glucagon (human recombinant) injection 1 mg    glucose chewable tablet 16 g    glucose chewable tablet 24 g    lactated ringers infusion    metoprolol injection 5 mg    mupirocin 2 % ointment    naloxone 0.4 mg/mL injection 0.02 mg    nicotine 7 mg/24 hr 1 patch    ondansetron injection 4 mg    sodium chloride 0.9% flush 10 mL       LABS    Recent Results (from the past 24 hour(s))   Basic metabolic panel    Collection Time: 08/10/23  4:02 PM   Result Value Ref Range    Sodium 134 (L) 136 - 145 mmol/L    Potassium 3.5 3.5 - 5.1 mmol/L    Chloride 96 95 - 110 mmol/L    CO2 28 23 - 29 mmol/L    Glucose 121 (H) 70 - 110 mg/dL    BUN 56 (H) 8 - 23 mg/dL    Creatinine 3.9 (H) 0.5 - 1.4 mg/dL    Calcium 8.5 (L) 8.7 - 10.5 mg/dL    Anion Gap 10 8 - 16 mmol/L    eGFR 16 (A) >60 mL/min/1.73 m^2   Magnesium    Collection Time: 08/10/23  4:02 PM   Result Value Ref Range    Magnesium 1.6 1.6 - 2.6 mg/dL   Phosphorus    Collection Time: 08/11/23  3:26 AM   Result Value Ref Range    Phosphorus 3.7 2.7 - 4.5 mg/dL   Magnesium    Collection Time: 08/11/23  3:26 AM   Result Value Ref Range    Magnesium 1.9 1.6 - 2.6 mg/dL   Comprehensive Metabolic Panel    Collection Time: 08/11/23  3:26 AM   Result Value Ref Range    Sodium 136 136 - 145 mmol/L    Potassium 3.5 3.5 - 5.1 mmol/L    Chloride 97 95 - 110 mmol/L    CO2 30 (H) 23 - 29 mmol/L    Glucose 109 70 - 110 mg/dL    BUN 50 (H) 8 - 23 mg/dL    Creatinine 3.5 (H) 0.5 - 1.4 mg/dL    Calcium 8.5 (L) 8.7 - 10.5 mg/dL    Total Protein 5.6 (L) 6.0 - 8.4 g/dL    Albumin 1.8 (L) 3.5 - 5.2 g/dL    Total Bilirubin 0.9 0.1 - 1.0 mg/dL    Alkaline Phosphatase 73 55 - 135 U/L    AST 47 (H) 10 - 40 U/L    ALT 48 (H) 10 - 44 U/L    eGFR 19 (A) >60 mL/min/1.73 m^2    Anion Gap 9 8 - 16  mmol/L   CBC Auto Differential    Collection Time: 08/11/23  3:26 AM   Result Value Ref Range    WBC 11.40 3.90 - 12.70 K/uL    RBC 3.96 (L) 4.60 - 6.20 M/uL    Hemoglobin 12.0 (L) 14.0 - 18.0 g/dL    Hematocrit 36.0 (L) 40.0 - 54.0 %    MCV 91 82 - 98 fL    MCH 30.3 27.0 - 31.0 pg    MCHC 33.3 32.0 - 36.0 g/dL    RDW 14.0 11.5 - 14.5 %    Platelets 195 150 - 450 K/uL    MPV 9.7 9.2 - 12.9 fL    Immature Granulocytes 0.5 0.0 - 0.5 %    Gran # (ANC) 8.3 (H) 1.8 - 7.7 K/uL    Immature Grans (Abs) 0.06 (H) 0.00 - 0.04 K/uL    Lymph # 1.6 1.0 - 4.8 K/uL    Mono # 1.3 (H) 0.3 - 1.0 K/uL    Eos # 0.1 0.0 - 0.5 K/uL    Baso # 0.04 0.00 - 0.20 K/uL    nRBC 0 0 /100 WBC    Gran % 73.0 38.0 - 73.0 %    Lymph % 13.6 (L) 18.0 - 48.0 %    Mono % 11.4 4.0 - 15.0 %    Eosinophil % 1.1 0.0 - 8.0 %    Basophil % 0.4 0.0 - 1.9 %    Differential Method Automated    Nuclear Stress - Cardiology Interpreted    Collection Time: 08/11/23 11:43 AM   Result Value Ref Range    85% Max Predicted      Max Predicted      OHS CV CPX PATIENT IS MALE 1.0     OHS CV CPX PATIENT IS FEMALE 0.0     Systolic blood pressure 120 mmHg    Diastolic blood pressure 68 mmHg    HR at rest 54 bpm    Peak Systolic  mmHg    Peak Diatolic BP 57 mmHg    Peak HR 69 bpm    % Max HR Achieved 44     RPP 6,480     Peak RPP 7,452      Renal US    FINDINGS:  The right kidney measures approximately 11.1 cm in length.  There is no evidence for hydronephrosis.  Color imaging demonstrates flow to the right kidney with a resistive index measurement of 0.60.  At the lower pole of the right kidney there is a 4.1 mm hyperechoic focus that may relate to a small nonobstructing calculus.  At the upper pole of the right kidney there is a hypoechoic finding measuring approximately 11 x 6.9 by 13.4 mm with posterior wall enhancement and increased through transmission, most consistent with a small cyst.     The left kidney measures approximately 13 cm in length.  There  is no evidence for hydronephrosis.  Color imaging demonstrates flow to the left kidney with a resistive index measurement of 0.72.  At the mid to upper pole of the left kidney there is an exophytic hypoechoic finding measuring approximately 3.2 x 2.6 x 2.7 cm in size, consistent with a cyst.     The urinary bladder is identified measuring approximately 5 x 7.3 x 5.4 cm with a calculated volume of 102.42 mL.  Postvoid imaging is not available.     The prostate measures approximately 3.2 x 3 x 4.4 cm in size with a calculated volume of 22 cc.     Impression:     There is no evidence for hydronephrosis bilaterally.     Mild elevated resistive index of the left kidney measuring 0.72 can be seen with medical renal disease.     Nonobstructing calculus of the right kidney.     Renal cysts are noted.  I/O last 3 completed shifts:  In: 6143.9 [P.O.:420; I.V.:5426.2; IV Piggyback:297.7]  Out: 6100 [Urine:6100]    Vitals:    08/11/23 0800 08/11/23 0900 08/11/23 1000 08/11/23 1200   BP: 129/61 (!) 115/58  (!) 140/68   Pulse: (!) 55 (!) 58 (!) 58 61   Resp: (!) 22 (!) 21  (!) 25   Temp:    98 °F (36.7 °C)   TempSrc:    Oral   SpO2: 98% (!) 91%  (!) 91%   Weight:       Height:           No Jvd, Thyromegaly or Lymphadenopathy  Lungs: Fairly clear anteriorly and laterally  Cor: RRR no G or rubs  Abd: Soft benign good bowel sounds non tender  Ext: No E C C    A)    LAM cause likely related to decreased PO intake, meds, maybe infection (has elevated wbc) aleve FENA >1  Creat down again Great uo still   Hx of hrn   Hyper uricemia   Hx of drug use   Renal US suggests CKD  Asx K stone  Active Smoker   Active etoh user   Hypomag hypoK  Hypoalb mild proteinuria       P)    Renal Diet  Home meds  Protect access  HD not needed right now  Binders prn  Adjust all meds to the degree of renal fx  Close follow up I/O and weights  Maintain Hydration   No need for K bx  Recheck bmp mag and k after replenishing     Out pt consider SANDI

## 2023-08-11 NOTE — PT/OT/SLP EVAL
"Occupational Therapy Evaluation     Name: Vivek Whitman  MRN: [id number]  Admitting Diagnosis: ARF (acute renal failure)  Recent Surgery: Procedure(s) (LRB):  Cardioversion or Defibrillation (N/A)  Cardioversion (N/A) 1 Day Post-Op    Recommendations:     Discharge Recommendations: home  Level of Assistance Recommended: Intermittent supervision  Discharge Equipment Recommendations: none, other (see comments) (ongoing)  Barriers to discharge: Other (Comment) (pt. agitation and substance abuse)    Assessment:     Vivek Whitman is a 62 y.o. male with a medical diagnosis of ARF (acute renal failure). He presents with performance deficits affecting function including weakness, impaired endurance, impaired sensation, impaired functional mobility, impaired balance, impaired cognition, decreased lower extremity function, decreased safety awareness. Pt. With HOB elevated with no pain noted. Patient became agitated when occupational therapy asked him to don socks and he put on his shoes and threatened to leave AMA. RN intervened and he became calmer.     Rehab Prognosis: Fair; patient would benefit from acute OT services to address these deficits and reach maximum level of function.    Plan:     Patient to be seen 2 x/week to address the above listed problems via self-care/home management, therapeutic activities, therapeutic exercises  Plan of Care Expires: 08/18/23  Plan of Care Reviewed with: patient    Subjective     Chief Complaint: "You are trying to make me do things I don't want to do"   Patient Comments/Goals:  call daughter to take him home   Pain/Comfort:  Pain Rating 1: 0/10    Patients cultural, spiritual, Druze conflicts given the current situation: no    Social History:  Living Environment: Patient lives with their cousin and wife  in a single story home with number of outside stair(s): 0   Prior Level of Function: Prior to admission, patient with undetermined level of function  Roles and Routines: Patient was  " undetermined due to becoming uncooperative during evaluation  prior to admission.  Equipment Used at Home: none  DME owned (not currently used): none  Assistance Upon Discharge: family    Objective:     Communicated with RNJing,  prior to session. Patient found HOB elevated with telemetry, peripheral IV upon OT entry to room.    General Precautions: Standard, fall   Orthopedic Precautions: N/A   Braces: N/A    Respiratory Status: Room air    Occupational Performance    Gait belt applied - N/A; patient declined      Bed Mobility:   Rolling/Turning to Left with supervision; Scooting to HOB in supine: supervision  Scooting anteriorly to EOB to have both feet planted on floor: supervision  Supine to sit from left side of bed with supervision  Sit to Supine with supervision on Left  side of bed  He could do without physical assistance needed, but needed cues to remind him of IV and other lines before standing and walking away from bed     Functional Mobility/Transfers:  Sit <> Stand Transfer with supervision with no AD  Functional Mobility: Patient did not take any steps due to agitation re: evaluation     Activities of Daily Living:  Lower Body Dressing: set up assistance to don Croc shoes     Cognitive/Visual Perceptual:  Cognitive/Psychosocial Skills:    -     Oriented to: Person  -     Follows Commands/attention: Follows multistep  commands  -     Communication: clear/fluent  -     Memory: he knew his birthdate, but did not participate with other questions   -     Safety awareness/insight to disability: impaired  -     Mood/Affect/Coping skills/emotional control: Anger and Lashes out  Visual/Perceptual:    -     Intact    Physical Exam:  Balance:    -     Sitting: independence  -     Standing: supervision  Postural examination/scapula alignment:    -       Rounded shoulders  -       Forward head  Skin integrity: Visible skin intact  Edema:  None noted  Sensation:    -       Intact  Motor Planning:  Intact  Dominant hand: Right  Upper Extremity Range of Motion:     -       Right Upper Extremity: WNL  -       Left Upper Extremity: WNL  Upper Extremity Strength:    -       Right Upper Extremity: WNL  -       Left Upper Extremity: WNL   Strength:    -       Right Upper Extremity: WNL  -       Left Upper Extremity: WNL  Fine Motor Coordination:    -       Intact  Gross motor coordination:   WFL    AMPAC 6 Click ADL:  AMPAC Total Score: 24    Treatment & Education:  Therapist provided facilitation and instruction of proper body mechanics, energy conservation, and fall prevention strategies during tasks listed above  Patient educated on role of OT, POC, and goals for therapy  Patient educated on importance of OOB activities with staff member assistance and sitting OOB majority of the day  He said he does not have any DME at home.     Patient clear to ambulate to/from bathroom with RN/PCT, assist x1  .    Patient left HOB elevated with all lines intact, call button in reach, RN notified, and bed alarm on.    GOALS:   Multidisciplinary Problems       Occupational Therapy Goals          Problem: Occupational Therapy    Goal Priority Disciplines Outcome Interventions   Occupational Therapy Goal     OT, PT/OT Ongoing, Progressing    Description: Goals to be met by: 8/11/23     Patient will increase functional independence with ADLs by performing:    UE Dressing with [place].  LE Dressing with [place].  Grooming while standing at sink with [place].  Toileting from toilet with [place] for hygiene and clothing management.   Sitting at edge of bed x30 minutes with [place].  Toilet transfer to toilet with [place].  Upper extremity exercise program x10 reps per handout, with independence  .                         History:     Past Medical History:   Diagnosis Date    Hypertension          Past Surgical History:   Procedure Laterality Date    CARDIOVERSION N/A 8/8/2023    Procedure:  Cardioversion;  Surgeon: Dank Claudio MD;  Location: [facility] CATH LAB;  Service: Cardiology;  Laterality: N/A;    CARDIOVERSION N/A 8/10/2023    Procedure: Cardioversion;  Surgeon: Dank Claudio MD;  Location: [facility] CATH LAB;  Service: Cardiology;  Laterality: N/A;    HERNIA REPAIR      TRANSESOPHAGEAL ECHOCARDIOGRAM WITH POSSIBLE CARDIOVERSION (KASHIF W/ POSS CARDIOVERSION) N/A 8/8/2023    Procedure: Transesophageal echo (KASHIF) intra-procedure log documentation;  Surgeon: Dank Claudio MD;  Location: [facility] CATH LAB;  Service: Cardiology;  Laterality: N/A;    TRANSESOPHAGEAL ECHOCARDIOGRAPHY N/A 8/8/2023    Procedure: ECHOCARDIOGRAM, TRANSESOPHAGEAL;  Surgeon: Dank Claudio MD;  Location: [facility] CATH LAB;  Service: Cardiology;  Laterality: N/A;    TREATMENT OF CARDIAC ARRHYTHMIA N/A 8/10/2023    Procedure: Cardioversion or Defibrillation;  Surgeon: Dank Claudio MD;  Location: [facility] CATH LAB;  Service: Cardiology;  Laterality: N/A;       Time Tracking:     OT Date of Treatment: 08/11/23  OT Start Time: 1416  OT Stop Time: 1434  OT Total Time (min): 18 min    Billable Minutes: Evaluation 18  Co-eval with PT     8/11/2023

## 2023-08-11 NOTE — PROGRESS NOTES
[facility]  Cardiology  Progress Note    Patient Name: Vivek Whitman  MRN: [id number]  Admission Date: 8/6/2023  Hospital Length of Stay: 4 days  Code Status: Full Code   Attending Physician: Glynn Blunt MD   Primary Care Physician: Racheal, Primary Doctor  Expected Discharge Date:   Principal Problem:ARF (acute renal failure)    Subjective:     Hospital Course:   No notes on file    Interval History:  Went back into AFib with RVR yesterday.  Hence cardioversion was performed again and sotalol was added.      Review of Systems   Constitutional: Positive for malaise/fatigue.   HENT: Negative.     Eyes: Negative.    Endocrine: Negative.    Hematologic/Lymphatic: Negative.    Skin: Negative.    Musculoskeletal: Negative.    Gastrointestinal: Negative.    Genitourinary: Negative.    Neurological: Negative.    Psychiatric/Behavioral: Negative.     Allergic/Immunologic: Negative.      Objective:     Vital Signs (Most Recent):  Temp: 99.9 °F (37.7 °C) (08/10/23 2000)  Pulse: (!) 59 (08/10/23 2300)  Resp: (!) 24 (08/10/23 2300)  BP: 138/70 (08/10/23 2300)  SpO2: 98 % (08/10/23 2300) Vital Signs (24h Range):  Temp:  [97 °F (36.1 °C)-99.9 °F (37.7 °C)] 99.9 °F (37.7 °C)  Pulse:  [] 59  Resp:  [17-35] 24  SpO2:  [91 %-99 %] 98 %  BP: ()/(48-85) 138/70     Weight: 93 kg (205 lb)  Body mass index is 31.17 kg/m².     SpO2: 98 %         Intake/Output Summary (Last 24 hours) at 8/10/2023 2359  Last data filed at 8/10/2023 2206  Gross per 24 hour   Intake 4543.58 ml   Output 3900 ml   Net 643.58 ml         Lines/Drains/Airways       Peripheral Intravenous Line  Duration                  Peripheral IV - Single Lumen 08/09/23 1900 20 G Left;Posterior;Proximal Forearm 1 day         Peripheral IV - Single Lumen 08/09/23 1900 22 G Distal;Posterior;Right Forearm 1 day                       Physical Exam  Vitals reviewed.   Constitutional:       Appearance: He is well-developed.   HENT:      Head: Normocephalic.  "  Eyes:      Conjunctiva/sclera: Conjunctivae normal.      Pupils: Pupils are equal, round, and reactive to light.   Cardiovascular:      Rate and Rhythm: Normal rate and regular rhythm.      Heart sounds: Normal heart sounds.   Pulmonary:      Effort: Pulmonary effort is normal.      Breath sounds: Normal breath sounds.   Abdominal:      General: Bowel sounds are normal.      Palpations: Abdomen is soft.   Musculoskeletal:      Cervical back: Normal range of motion and neck supple.   Skin:     General: Skin is warm.   Neurological:      Mental Status: He is alert and oriented to person, place, and time.            Significant Labs: BMP:   Recent Labs   Lab 08/09/23  0601 08/10/23  0603 08/10/23  1602    114* 121*   * 134* 134*   K 3.6 2.9* 3.5   CL 96 96 96   CO2 22* 27 28   BUN 80* 63* 56*   CREATININE 6.1* 4.6* 3.9*   CALCIUM 8.5* 8.5* 8.5*   MG 1.6 1.3* 1.6     , CMP   Recent Labs   Lab 08/09/23  0601 08/10/23  0603 08/10/23  1602   * 134* 134*   K 3.6 2.9* 3.5   CL 96 96 96   CO2 22* 27 28    114* 121*   BUN 80* 63* 56*   CREATININE 6.1* 4.6* 3.9*   CALCIUM 8.5* 8.5* 8.5*   ALBUMIN  --  1.9*  --    ANIONGAP 16 11 10     , CBC   Recent Labs   Lab 08/09/23  0601   WBC 13.06*   HGB 12.3*   HCT 35.8*        , Lipid Panel No results for input(s): "CHOL", "HDL", "LDLCALC", "TRIG", "CHOLHDL" in the last 48 hours., Troponin No results for input(s): "TROPONINI" in the last 48 hours., and All pertinent lab results from the last 24 hours have been reviewed.    Significant Imaging: Echocardiogram: Transthoracic echo (TTE) complete (Cupid Only):   Results for orders placed or performed during the hospital encounter of 08/06/23   Echo   Result Value Ref Range    LVOT stroke volume 93.88 cm3    LVIDd 4.57 3.5 - 6.0 cm    LV Systolic Volume 33.45 mL    LVIDs 2.95 2.1 - 4.0 cm    LV Diastolic Volume 95.78 mL    IVS 1.29 (A) 0.6 - 1.1 cm    LVOT diameter 2.01 cm    LVOT area 3.2 cm2    FS 35 28 - " 44 %    Left Ventricle Relative Wall Thickness 0.56 cm    Posterior Wall 1.28 (A) 0.6 - 1.1 cm    LV mass 224.03 g    MV Peak E Gonzalez 0.79 m/s    TDI LATERAL 0.07 m/s    TDI SEPTAL 0.05 m/s    E/E' ratio 13.17 m/s    MV Peak A Gonzalez 0.79 m/s    TR Max Gonzalez 2.24 m/s    E/A ratio 1.00     IVRT 140.82 msec    E wave deceleration time 207.32 msec    LV SEPTAL E/E' RATIO 15.80 m/s    LV LATERAL E/E' RATIO 11.29 m/s    LVOT peak gonzalez 1.31 m/s    Left Ventricular Outflow Tract Mean Velocity 0.82 cm/s    Left Ventricular Outflow Tract Mean Gradient 3.37 mmHg    LA size 4.11 cm    Left Atrium Major Axis 5.80 cm    Left Atrium Minor Axis 5.83 cm    RV S' 16.17 cm/s    TAPSE 2.14 cm    RA Major Axis 6.64 cm    AV mean gradient 9 mmHg    AV peak gradient 17 mmHg    Ao peak gonzalez 2.06 m/s    Ao VTI 37.70 cm    LVOT peak VTI 29.60 cm    AV valve area 2.49 cm²    AV Velocity Ratio 0.64     AV index (prosthetic) 0.79     DELMER by Velocity Ratio 2.02 cm²    MV mean gradient 1 mmHg    MV peak gradient 4 mmHg    MV stenosis pressure 1/2 time 60.12 ms    MV valve area p 1/2 method 3.66 cm2    MV valve area by continuity eq 3.48 cm2    MV VTI 27.0 cm    Triscuspid Valve Regurgitation Peak Gradient 20 mmHg    PV PEAK VELOCITY 1.07 m/s    PV peak gradient 5 mmHg    Sinus 3.55 cm    STJ 1.93 cm    Ascending aorta 2.73 cm    IVC diameter 1.74 cm    Mean e' 0.06 m/s    RVDD 4.55 cm    LA volume 97.51 cm3    LA WIDTH 4.8 cm    RA Width 4.2 cm    TV resting pulmonary artery pressure 23 mmHg    RV TB RVSP 5 mmHg    Est. RA pres 3 mmHg    Narrative      Left Ventricle: The left ventricle is normal in size. Mildly increased   wall thickness. Normal wall motion. There is normal systolic function with   a visually estimated ejection fraction of 65 - 70%. Grade I diastolic   dysfunction.    Left Atrium: Left atrium is severely dilated.    Right Ventricle: Normal right ventricular cavity size. Systolic   function is normal.    Right Atrium: Right atrium is  moderately dilated.    Aortic Valve: There is aortic valve sclerosis.    Tricuspid Valve: There is mild regurgitation.    Pulmonary Artery: The estimated pulmonary artery systolic pressure is   23 mmHg.    IVC/SVC: Normal venous pressure at 3 mmHg.       Assessment and Plan:     Brief HPI:     * ARF (acute renal failure)  Iv hydration    A-fib  afib with rvr. Likely ppted by lolis.  Heparin drip. amio drip for rate control.   Silver cv in am if continues to be in afib  Not a candidate for long term amio because of severe pulm disease.    8/8:  Continues to be in AFib with RVR.  Will do SILVER and cardioversion today    -No absolute contraindications of esophageal stricture, tumor, perforation, laceration,or diverticulum and/or active GI bleed  -The risks, benefits & alternatives of the procedure were explained to the patient.   -The risks of transesophageal echo include but are not limited to:  Dental trauma, esophageal trauma/perforation, bleeding, laryngospasm/brochospasm, aspiration, sore throat/hoarseness, & dislodgement of the endotracheal tube/nasogastric tube (where applicable).    -The risks of ANES monitored sedation include hypotension, respiratory depression, arrhythmias, bronchospasm, & death.    -Informed consent was obtained. The patient is agreeable to proceed with the procedure and all questions and concerns addressed.      Risks, benefits and alternatives of the SILVER/Cardioversion procedure were discussed with the patient including throat irritation, aspiration, anesthetic complications, esophageal irritation/perforation, skin irritation, arrhythmia, etc.  Patient understands and agrees to proceed.  Consent was placed on the chart.      8/9:s/p silver and cv yesterday. Continues to be in nsr. initiate oac.     8/10: cardioversion was performed again todayas patient went into afib with rvr sotalol was added. Monitor qtc.     Hypotension  Likely secondry to dehydration. Continue to monitor response to iv  hydration. Hold anti hypertensives. Avoid nephrotoxic agents    Dehydration  Iv hydration    Tobacco abuse        Heroin abuse  States is now clean        VTE Risk Mitigation (From admission, onward)         Ordered     apixaban tablet 5 mg  2 times daily         08/08/23 1634     IP VTE LOW RISK PATIENT  Once         08/07/23 0211     Place sequential compression device  Until discontinued         08/07/23 0211                Dank Claudio MD  Cardiology  [facility] - Intensive Care      Critical Care Time:  35 minutes     Critical care was time spent personally by me on the following activities: development of treatment plan with patient or surrogate and bedside caregivers, discussions with consultants, evaluation of patient's response to treatment, examination of patient, ordering and performing treatments and interventions, ordering and review of laboratory studies, ordering and review of radiographic studies, pulse oximetry, re-evaluation of patient's condition. This critical care time did not overlap with that of any other provider or involve time for any procedures.

## 2023-08-11 NOTE — PT/OT/SLP PROGRESS
Occupational Therapy      Patient Name:  iVvek Whitman   MRN:  [id number]    Patient not seen today secondary to Testing/imaging (xray/CT/MRI). Will follow-up later today.    8/11/2023

## 2023-08-11 NOTE — ASSESSMENT & PLAN NOTE
Now back in SR after KASHIF/DCCV and repeat DCCV.  Given LAM and prolonged QTc, will stop sotalol.  Cont DOAC  Check Priscilla MPI today, if neg will start flecainide.  Amio as contingency.  Will hold off for 24 hrs and repeat EKG to see if QT has normalized.

## 2023-08-11 NOTE — ASSESSMENT & PLAN NOTE
afib with rvr. Likely ppted by lolis.  Heparin drip. amio drip for rate control.   Silver cv in am if continues to be in afib  Not a candidate for long term amio because of severe pulm disease.    8/8:  Continues to be in AFib with RVR.  Will do SILVER and cardioversion today    -No absolute contraindications of esophageal stricture, tumor, perforation, laceration,or diverticulum and/or active GI bleed  -The risks, benefits & alternatives of the procedure were explained to the patient.   -The risks of transesophageal echo include but are not limited to:  Dental trauma, esophageal trauma/perforation, bleeding, laryngospasm/brochospasm, aspiration, sore throat/hoarseness, & dislodgement of the endotracheal tube/nasogastric tube (where applicable).    -The risks of ANES monitored sedation include hypotension, respiratory depression, arrhythmias, bronchospasm, & death.    -Informed consent was obtained. The patient is agreeable to proceed with the procedure and all questions and concerns addressed.      Risks, benefits and alternatives of the SILVER/Cardioversion procedure were discussed with the patient including throat irritation, aspiration, anesthetic complications, esophageal irritation/perforation, skin irritation, arrhythmia, etc.  Patient understands and agrees to proceed.  Consent was placed on the chart.      8/9:s/p silver and cv yesterday. Continues to be in nsr. initiate oac.     8/10: cardioversion was performed again todayas patient went into afib with rvr sotalol was added. Monitor qtc.

## 2023-08-11 NOTE — SUBJECTIVE & OBJECTIVE
Interval History:  Went back into AFib with RVR yesterday.  Hence cardioversion was performed again and sotalol was added.      Review of Systems   Constitutional: Positive for malaise/fatigue.   HENT: Negative.     Eyes: Negative.    Endocrine: Negative.    Hematologic/Lymphatic: Negative.    Skin: Negative.    Musculoskeletal: Negative.    Gastrointestinal: Negative.    Genitourinary: Negative.    Neurological: Negative.    Psychiatric/Behavioral: Negative.     Allergic/Immunologic: Negative.      Objective:     Vital Signs (Most Recent):  Temp: 99.9 °F (37.7 °C) (08/10/23 2000)  Pulse: (!) 59 (08/10/23 2300)  Resp: (!) 24 (08/10/23 2300)  BP: 138/70 (08/10/23 2300)  SpO2: 98 % (08/10/23 2300) Vital Signs (24h Range):  Temp:  [97 °F (36.1 °C)-99.9 °F (37.7 °C)] 99.9 °F (37.7 °C)  Pulse:  [] 59  Resp:  [17-35] 24  SpO2:  [91 %-99 %] 98 %  BP: ()/(48-85) 138/70     Weight: 93 kg (205 lb)  Body mass index is 31.17 kg/m².     SpO2: 98 %         Intake/Output Summary (Last 24 hours) at 8/10/2023 2359  Last data filed at 8/10/2023 2206  Gross per 24 hour   Intake 4543.58 ml   Output 3900 ml   Net 643.58 ml         Lines/Drains/Airways       Peripheral Intravenous Line  Duration                  Peripheral IV - Single Lumen 08/09/23 1900 20 G Left;Posterior;Proximal Forearm 1 day         Peripheral IV - Single Lumen 08/09/23 1900 22 G Distal;Posterior;Right Forearm 1 day                       Physical Exam  Vitals reviewed.   Constitutional:       Appearance: He is well-developed.   HENT:      Head: Normocephalic.   Eyes:      Conjunctiva/sclera: Conjunctivae normal.      Pupils: Pupils are equal, round, and reactive to light.   Cardiovascular:      Rate and Rhythm: Normal rate and regular rhythm.      Heart sounds: Normal heart sounds.   Pulmonary:      Effort: Pulmonary effort is normal.      Breath sounds: Normal breath sounds.   Abdominal:      General: Bowel sounds are normal.      Palpations: Abdomen is  "soft.   Musculoskeletal:      Cervical back: Normal range of motion and neck supple.   Skin:     General: Skin is warm.   Neurological:      Mental Status: He is alert and oriented to person, place, and time.            Significant Labs: BMP:   Recent Labs   Lab 08/09/23  0601 08/10/23  0603 08/10/23  1602    114* 121*   * 134* 134*   K 3.6 2.9* 3.5   CL 96 96 96   CO2 22* 27 28   BUN 80* 63* 56*   CREATININE 6.1* 4.6* 3.9*   CALCIUM 8.5* 8.5* 8.5*   MG 1.6 1.3* 1.6     , CMP   Recent Labs   Lab 08/09/23  0601 08/10/23  0603 08/10/23  1602   * 134* 134*   K 3.6 2.9* 3.5   CL 96 96 96   CO2 22* 27 28    114* 121*   BUN 80* 63* 56*   CREATININE 6.1* 4.6* 3.9*   CALCIUM 8.5* 8.5* 8.5*   ALBUMIN  --  1.9*  --    ANIONGAP 16 11 10     , CBC   Recent Labs   Lab 08/09/23  0601   WBC 13.06*   HGB 12.3*   HCT 35.8*        , Lipid Panel No results for input(s): "CHOL", "HDL", "LDLCALC", "TRIG", "CHOLHDL" in the last 48 hours., Troponin No results for input(s): "TROPONINI" in the last 48 hours., and All pertinent lab results from the last 24 hours have been reviewed.    Significant Imaging: Echocardiogram: Transthoracic echo (TTE) complete (Cupid Only):   Results for orders placed or performed during the hospital encounter of 08/06/23   Echo   Result Value Ref Range    LVOT stroke volume 93.88 cm3    LVIDd 4.57 3.5 - 6.0 cm    LV Systolic Volume 33.45 mL    LVIDs 2.95 2.1 - 4.0 cm    LV Diastolic Volume 95.78 mL    IVS 1.29 (A) 0.6 - 1.1 cm    LVOT diameter 2.01 cm    LVOT area 3.2 cm2    FS 35 28 - 44 %    Left Ventricle Relative Wall Thickness 0.56 cm    Posterior Wall 1.28 (A) 0.6 - 1.1 cm    LV mass 224.03 g    MV Peak E Gonzalez 0.79 m/s    TDI LATERAL 0.07 m/s    TDI SEPTAL 0.05 m/s    E/E' ratio 13.17 m/s    MV Peak A Gonzalez 0.79 m/s    TR Max Gonzalez 2.24 m/s    E/A ratio 1.00     IVRT 140.82 msec    E wave deceleration time 207.32 msec    LV SEPTAL E/E' RATIO 15.80 m/s    LV LATERAL E/E' RATIO " 11.29 m/s    LVOT peak ginny 1.31 m/s    Left Ventricular Outflow Tract Mean Velocity 0.82 cm/s    Left Ventricular Outflow Tract Mean Gradient 3.37 mmHg    LA size 4.11 cm    Left Atrium Major Axis 5.80 cm    Left Atrium Minor Axis 5.83 cm    RV S' 16.17 cm/s    TAPSE 2.14 cm    RA Major Axis 6.64 cm    AV mean gradient 9 mmHg    AV peak gradient 17 mmHg    Ao peak ginny 2.06 m/s    Ao VTI 37.70 cm    LVOT peak VTI 29.60 cm    AV valve area 2.49 cm²    AV Velocity Ratio 0.64     AV index (prosthetic) 0.79     DELMER by Velocity Ratio 2.02 cm²    MV mean gradient 1 mmHg    MV peak gradient 4 mmHg    MV stenosis pressure 1/2 time 60.12 ms    MV valve area p 1/2 method 3.66 cm2    MV valve area by continuity eq 3.48 cm2    MV VTI 27.0 cm    Triscuspid Valve Regurgitation Peak Gradient 20 mmHg    PV PEAK VELOCITY 1.07 m/s    PV peak gradient 5 mmHg    Sinus 3.55 cm    STJ 1.93 cm    Ascending aorta 2.73 cm    IVC diameter 1.74 cm    Mean e' 0.06 m/s    RVDD 4.55 cm    LA volume 97.51 cm3    LA WIDTH 4.8 cm    RA Width 4.2 cm    TV resting pulmonary artery pressure 23 mmHg    RV TB RVSP 5 mmHg    Est. RA pres 3 mmHg    Narrative      Left Ventricle: The left ventricle is normal in size. Mildly increased   wall thickness. Normal wall motion. There is normal systolic function with   a visually estimated ejection fraction of 65 - 70%. Grade I diastolic   dysfunction.    Left Atrium: Left atrium is severely dilated.    Right Ventricle: Normal right ventricular cavity size. Systolic   function is normal.    Right Atrium: Right atrium is moderately dilated.    Aortic Valve: There is aortic valve sclerosis.    Tricuspid Valve: There is mild regurgitation.    Pulmonary Artery: The estimated pulmonary artery systolic pressure is   23 mmHg.    IVC/SVC: Normal venous pressure at 3 mmHg.

## 2023-08-11 NOTE — NURSING TRANSFER
Nursing Transfer Note      8/11/2023   4:24 PM x1 attempt to call report  5:15 PM x2 attempt to call report  5:30 PM Report given to April    Nurse giving handoff:Jing  Nurse receiving handoff:April    Reason patient is being transferred: Downgrade level of care    Transfer To: [id number]    Transfer via wheelchair    Transfer with cardiac monitoring    Transported by RN    Transfer Vital Signs:  Blood Pressure:147/89  Heart Rate:63  O2:97  Temperature:98  Respirations:22    Telemetry: Box Number [id number], Rate 63, and Rhythm NSR  Order for Tele Monitor? Yes    Additional Lines: n/a    4eyes on Skin: yes    Medicines sent: LR @ 125cc/Hr    Any special needs or follow-up needed: none    Patient belongings transferred with patient: Yes    Chart send with patient: Yes    Notified: declined notification    Patient reassessed at:     1  Upon arrival to floor: cardiac monitor applied, patient oriented to room, call bell in reach, and bed in lowest position    [facility] [place]  Nurses Note -- 4 Eyes      8/11/2023       Skin assessed on: Transfer      [x] No Pressure Injuries Present    [x]Prevention Measures Documented    [] Yes LDA  for Pressure Injury Previously documented     [] Yes New Pressure Injury Discovered   [] LDA for New Pressure Injury Added      Attending RN:  Jing Hidalgo RN     Second RN:  Arabella

## 2023-08-12 LAB
ALBUMIN SERPL BCP-MCNC: 1.9 G/DL (ref 3.5–5.2)
ALP SERPL-CCNC: 79 U/L (ref 55–135)
ALT SERPL W/O P-5'-P-CCNC: 54 U/L (ref 10–44)
ANION GAP SERPL CALC-SCNC: 11 MMOL/L (ref 8–16)
AST SERPL-CCNC: 68 U/L (ref 10–40)
BACTERIA BLD CULT: NORMAL
BACTERIA BLD CULT: NORMAL
BASOPHILS # BLD AUTO: 0.07 K/UL (ref 0–0.2)
BASOPHILS NFR BLD: 0.7 % (ref 0–1.9)
BILIRUB SERPL-MCNC: 0.8 MG/DL (ref 0.1–1)
BUN SERPL-MCNC: 47 MG/DL (ref 8–23)
CALCIUM SERPL-MCNC: 8.8 MG/DL (ref 8.7–10.5)
CHLORIDE SERPL-SCNC: 97 MMOL/L (ref 95–110)
CO2 SERPL-SCNC: 29 MMOL/L (ref 23–29)
CREAT SERPL-MCNC: 2.7 MG/DL (ref 0.5–1.4)
DIFFERENTIAL METHOD: ABNORMAL
EOSINOPHIL # BLD AUTO: 0.3 K/UL (ref 0–0.5)
EOSINOPHIL NFR BLD: 3.4 % (ref 0–8)
ERYTHROCYTE [DISTWIDTH] IN BLOOD BY AUTOMATED COUNT: 14 % (ref 11.5–14.5)
EST. GFR  (NO RACE VARIABLE): 26 ML/MIN/1.73 M^2
GLUCOSE SERPL-MCNC: 92 MG/DL (ref 70–110)
HCT VFR BLD AUTO: 38.8 % (ref 40–54)
HGB BLD-MCNC: 13.2 G/DL (ref 14–18)
IMM GRANULOCYTES # BLD AUTO: 0.08 K/UL (ref 0–0.04)
IMM GRANULOCYTES NFR BLD AUTO: 0.8 % (ref 0–0.5)
LYMPHOCYTES # BLD AUTO: 1.8 K/UL (ref 1–4.8)
LYMPHOCYTES NFR BLD: 18.3 % (ref 18–48)
MAGNESIUM SERPL-MCNC: 1.3 MG/DL (ref 1.6–2.6)
MCH RBC QN AUTO: 30.1 PG (ref 27–31)
MCHC RBC AUTO-ENTMCNC: 34 G/DL (ref 32–36)
MCV RBC AUTO: 89 FL (ref 82–98)
MONOCYTES # BLD AUTO: 1.1 K/UL (ref 0.3–1)
MONOCYTES NFR BLD: 11.2 % (ref 4–15)
NEUTROPHILS # BLD AUTO: 6.3 K/UL (ref 1.8–7.7)
NEUTROPHILS NFR BLD: 65.6 % (ref 38–73)
NRBC BLD-RTO: 0 /100 WBC
PHOSPHATE SERPL-MCNC: 3.1 MG/DL (ref 2.7–4.5)
PLATELET # BLD AUTO: 201 K/UL (ref 150–450)
PMV BLD AUTO: 9.8 FL (ref 9.2–12.9)
POTASSIUM SERPL-SCNC: 3.1 MMOL/L (ref 3.5–5.1)
PROT SERPL-MCNC: 5.9 G/DL (ref 6–8.4)
RBC # BLD AUTO: 4.38 M/UL (ref 4.6–6.2)
SODIUM SERPL-SCNC: 137 MMOL/L (ref 136–145)
WBC # BLD AUTO: 9.54 K/UL (ref 3.9–12.7)

## 2023-08-12 PROCEDURE — 99233 SBSQ HOSP IP/OBS HIGH 50: CPT | Mod: ,,, | Performed by: INTERNAL MEDICINE

## 2023-08-12 PROCEDURE — 25000242 PHARM REV CODE 250 ALT 637 W/ HCPCS: Performed by: INTERNAL MEDICINE

## 2023-08-12 PROCEDURE — 25000003 PHARM REV CODE 250: Performed by: STUDENT IN AN ORGANIZED HEALTH CARE EDUCATION/TRAINING PROGRAM

## 2023-08-12 PROCEDURE — 25000003 PHARM REV CODE 250: Performed by: INTERNAL MEDICINE

## 2023-08-12 PROCEDURE — 83735 ASSAY OF MAGNESIUM: CPT | Performed by: STUDENT IN AN ORGANIZED HEALTH CARE EDUCATION/TRAINING PROGRAM

## 2023-08-12 PROCEDURE — 94640 AIRWAY INHALATION TREATMENT: CPT

## 2023-08-12 PROCEDURE — 63600175 PHARM REV CODE 636 W HCPCS: Performed by: INTERNAL MEDICINE

## 2023-08-12 PROCEDURE — 80053 COMPREHEN METABOLIC PANEL: CPT | Performed by: STUDENT IN AN ORGANIZED HEALTH CARE EDUCATION/TRAINING PROGRAM

## 2023-08-12 PROCEDURE — 99233 PR SUBSEQUENT HOSPITAL CARE,LEVL III: ICD-10-PCS | Mod: ,,, | Performed by: INTERNAL MEDICINE

## 2023-08-12 PROCEDURE — 94761 N-INVAS EAR/PLS OXIMETRY MLT: CPT

## 2023-08-12 PROCEDURE — 11000001 HC ACUTE MED/SURG PRIVATE ROOM

## 2023-08-12 PROCEDURE — 84100 ASSAY OF PHOSPHORUS: CPT | Performed by: STUDENT IN AN ORGANIZED HEALTH CARE EDUCATION/TRAINING PROGRAM

## 2023-08-12 PROCEDURE — S4991 NICOTINE PATCH NONLEGEND: HCPCS | Performed by: INTERNAL MEDICINE

## 2023-08-12 PROCEDURE — 36415 COLL VENOUS BLD VENIPUNCTURE: CPT | Performed by: STUDENT IN AN ORGANIZED HEALTH CARE EDUCATION/TRAINING PROGRAM

## 2023-08-12 PROCEDURE — 85025 COMPLETE CBC W/AUTO DIFF WBC: CPT | Performed by: STUDENT IN AN ORGANIZED HEALTH CARE EDUCATION/TRAINING PROGRAM

## 2023-08-12 RX ORDER — POTASSIUM CHLORIDE 20 MEQ/1
60 TABLET, EXTENDED RELEASE ORAL ONCE
Status: COMPLETED | OUTPATIENT
Start: 2023-08-12 | End: 2023-08-12

## 2023-08-12 RX ADMIN — MUPIROCIN: 20 OINTMENT TOPICAL at 09:08

## 2023-08-12 RX ADMIN — NICOTINE 1 PATCH: 7 PATCH, EXTENDED RELEASE TRANSDERMAL at 09:08

## 2023-08-12 RX ADMIN — IPRATROPIUM BROMIDE AND ALBUTEROL SULFATE 3 ML: 2.5; .5 SOLUTION RESPIRATORY (INHALATION) at 07:08

## 2023-08-12 RX ADMIN — APIXABAN 5 MG: 5 TABLET, FILM COATED ORAL at 09:08

## 2023-08-12 RX ADMIN — ASPIRIN 81 MG: 81 TABLET, COATED ORAL at 09:08

## 2023-08-12 RX ADMIN — IPRATROPIUM BROMIDE AND ALBUTEROL SULFATE 3 ML: 2.5; .5 SOLUTION RESPIRATORY (INHALATION) at 01:08

## 2023-08-12 RX ADMIN — IPRATROPIUM BROMIDE AND ALBUTEROL SULFATE 3 ML: 2.5; .5 SOLUTION RESPIRATORY (INHALATION) at 06:08

## 2023-08-12 RX ADMIN — FAMOTIDINE 20 MG: 20 TABLET, FILM COATED ORAL at 09:08

## 2023-08-12 RX ADMIN — ATORVASTATIN CALCIUM 40 MG: 40 TABLET, FILM COATED ORAL at 09:08

## 2023-08-12 RX ADMIN — SODIUM CHLORIDE, POTASSIUM CHLORIDE, SODIUM LACTATE AND CALCIUM CHLORIDE: 600; 310; 30; 20 INJECTION, SOLUTION INTRAVENOUS at 12:08

## 2023-08-12 RX ADMIN — SODIUM CHLORIDE, POTASSIUM CHLORIDE, SODIUM LACTATE AND CALCIUM CHLORIDE: 600; 310; 30; 20 INJECTION, SOLUTION INTRAVENOUS at 09:08

## 2023-08-12 RX ADMIN — ALLOPURINOL 100 MG: 100 TABLET ORAL at 09:08

## 2023-08-12 RX ADMIN — DOXYCYCLINE HYCLATE 100 MG: 100 TABLET, COATED ORAL at 09:08

## 2023-08-12 RX ADMIN — POTASSIUM CHLORIDE 60 MEQ: 1500 TABLET, EXTENDED RELEASE ORAL at 10:08

## 2023-08-12 NOTE — PROGRESS NOTES
[facility] Medicine  Progress Note    Patient Name: Vivek Whitman  MRN: [id number]  Patient Class: IP- Inpatient   Admission Date: 8/6/2023  Length of Stay: 5 days  Attending Physician: Hudson Ayon MD  Primary Care Provider: Racheal, Primary Doctor        Subjective:     Principal Problem:ARF (acute renal failure)        HPI:   62 y.o. male, with a PMHx of HTN, HLD, tobacco abuse, and hx of heroin abuse/alcohol abuse transferred from [facility] ED for further evaluation. He presented to the ED with cocerns of low blood pressure. Stated for the past week, he had low blood pressure readings with SBP 90-100s and DBP 50s. Noted hypotension associated with feeling fatigue, weak, and make him just want to sleep all day. Stated he has not eat or drink much. However, continued to take his home BP medications. Tried to hydrate himself with gatorade prior to coming to the ED. Denies any fever, chest pain, cough, shortness of breath, abdominal pain, nausea or vomiting. Admits he still smokes 1ppd, smoke for the past 40+ years.     In the ED, pt hypotensive with BP 94/53. Patient given IVF. Labs significant for leukocytosis, elevated Cr of 7.4, and elevated phosphorus. US renal with no evidence for hydronephrosis bilaterally.  CXR with no focal consolidations or edema. Patient admitted to  for further evaluation.       Overview/Hospital Course:  Patient admitted with hypotension and leukocytosis, and feeling unwell for the past couple weeks.  Patient's blood pressure again low today at 70/49, concerns he could bacteremia or another infection.  Initial presentation of AFib (and ARF) often in the setting of infection due to catecholamine release and hypotension, as well.  He is afebrile, however this does not exclude infection. Hx of IVDU makes him higher risk. Blood cultures taken.  , also concerning for an ongoing inflammatory issue/infection.  Given acute renal failure, will hold off on vancomycin for  now.  Will initiate IV cefepime and oral doxycycline.  Will await cultures to tailor antibiotics or deescalate.  8 a.m. cortisol was WNL.  Creatinine slowly improving with fluids. S/p successful DCCV, now in NSR. Improving overall. Urine picking up. S/d to  floor.     Patient went back into AFib RVR successfully cardioverted again, in NSR now.  Sotalol initiated.  Electrolytes severely depleted this morning, may have contributed, will replace IV and recheck in the afternoon.  Urine output increased, 4 L/24 H. WBC# normalized. Still in NSR. Will ask PT/OT to assess, likely home with HH tomorrow if HR still controlled. PT/OT rec: Home. CRT improved. Stress test was negative       Interval History: No new issues     Review of Systems   Constitutional:  Negative for activity change.   HENT:  Negative for congestion.    Respiratory:  Negative for apnea.    Gastrointestinal:  Negative for abdominal distention.   Genitourinary:  Negative for difficulty urinating.   Neurological:  Negative for dizziness.     Objective:     Vital Signs (Most Recent):  Temp: 98.1 °F (36.7 °C) (08/12/23 0701)  Pulse: 65 (08/12/23 0737)  Resp: 18 (08/12/23 0737)  BP: (!) 153/74 (08/12/23 0701)  SpO2: (!) 91 % (08/12/23 0737) Vital Signs (24h Range):  Temp:  [98 °F (36.7 °C)-98.9 °F (37.2 °C)] 98.1 °F (36.7 °C)  Pulse:  [58-73] 65  Resp:  [18-38] 18  SpO2:  [91 %-98 %] 91 %  BP: (126-174)/(58-89) 153/74     Weight: 93 kg (205 lb)  Body mass index is 31.17 kg/m².    Intake/Output Summary (Last 24 hours) at 8/12/2023 0902  Last data filed at 8/12/2023 0830  Gross per 24 hour   Intake 1187.55 ml   Output 4025 ml   Net -2837.45 ml         Physical Exam  Constitutional:       Appearance: Normal appearance. He is not ill-appearing.   Cardiovascular:      Rate and Rhythm: Normal rate and regular rhythm.   Pulmonary:      Effort: Pulmonary effort is normal. No respiratory distress.   Musculoskeletal:         General: No swelling.   Skin:      Coloration: Skin is not jaundiced.   Neurological:      Mental Status: He is alert.             Significant Labs: All pertinent labs within the past 24 hours have been reviewed.  BMP:   Recent Labs   Lab 08/12/23  0426   GLU 92      K 3.1*   CL 97   CO2 29   BUN 47*   CREATININE 2.7*   CALCIUM 8.8   MG 1.3*     CBC:   Recent Labs   Lab 08/11/23  0326 08/12/23  0426   WBC 11.40 9.54   HGB 12.0* 13.2*   HCT 36.0* 38.8*    201       Significant Imaging: I have reviewed all pertinent imaging results/findings within the past 24 hours.      Assessment/Plan:      * ARF (acute renal failure)  Patient with acute kidney injury/acute renal failure likely due to pre-renal azotemia due to dehydration LAM is currently worsening. Baseline creatinine unknown - Labs reviewed- Renal function/electrolytes with Estimated Creatinine Clearance: 30.6 mL/min (A) (based on SCr of 2.7 mg/dL (H)). according to latest data. Monitor urine output and serial BMP and adjust therapy as needed. Avoid nephrotoxins and renally dose meds for GFR listed above.    -Presented with LAM  -Cr on admit 7.4  -Baseline Cr unknown, but Cr 1.1 in 08/2022  -Suspect due to decreased PO intake and home meds (amolidpine-olmarsartan and chlorthalidone)  -Renal US with no obstruction. Findings consistent with CKD  -IVF  -Nephrology consulted: check FENA, pthi, and uric acid  -Avoid nephrotoxins, renal dose all meds.   -Monitor Is/Os  -Monitor     Improving CRT daily       Sepsis  This patient does not have evidence of infective focus  My overall impression is sepsis.  Source: Unknown  Antibiotics given-   Antibiotics (72h ago, onward)    Start     Stop Route Frequency Ordered    08/10/23 2228  ceFEPIme (MAXIPIME) 1 g in dextrose 5 % in water (D5W) 100 mL IVPB (MB+)         -- IV Every 24 hours (non-standard times) 08/10/23 0757    08/08/23 1015  doxycycline tablet 100 mg         -- Oral Every 12 hours 08/08/23 0901    08/08/23 0900  mupirocin 2 % ointment          08/13/23 0859 Nasl 2 times daily 08/08/23 0725        Latest lactate reviewed-  Recent Labs   Lab 08/08/23  0807   LACTATE 0.8     Organ dysfunction indicated by Acute kidney injury and New afib    Fluid challenge Actual Body weight- Patient will receive 30ml/kg actual body weight to calculate fluid bolus for treatment of septic shock.     Post- resuscitation assessment Yes Perfusion exam was performed within 6 hours of septic shock presentation after bolus shows Adequate tissue perfusion assessed by non-invasive monitoring       Will ordered but not started, responding to fluids Pressors- Levophed for MAP of 65  Source control achieved by: Abx and fluids    · Patient admitted with hypotension and leukocytosis, and feeling unwell for the past couple weeks.  Patient's blood pressure again low today at 70/49, concerns he could bacteremia or another infection.  No skin infections appreciated. Urine clean. No consolidation on CXR.  · Initial presentation of AFib (and ARF) often in the setting of infection due to catecholamine release and hypotension, as well.    · He is afebrile, however this does not exclude infection.    · Blood cultures taken.  , also concerning for an ongoing inflammatory issue/infection.    · Given acute renal failure, will hold off on vancomycin for now.    · Will initiate IV cefepime and oral doxycycline.   · Will await cultures to tailor antibiotics or deescalate.    · Unclear source, WBC normalized today 8/11, will transition to oral at d/c tomorrow to compete 7d course     Paroxysmal atrial fibrillation  Patient with Paroxysmal (<7 days) atrial fibrillation which is uncontrolled currently with Amio initially but bad lungs, cannot use long term.. Patient is currently in atrial fibrillation.[id number] Score: The patient doesn't have any registry metric data available. HASBLED Score: ?. Anticoagulation indicated. Anticoagulation done with Apix.   · Patient DCCV x2, now on sotalol-  will need home script at d/c  · In NSR now    Class 1 obesity with body mass index (BMI) of 31.0 to 31.9 in adult  Body mass index is 31.17 kg/m². Morbid obesity complicates all aspects of disease management from diagnostic modalities to treatment. Weight loss encouraged and health benefits explained to patient.         Leukocytosis  -WBC# 16.07 on admission w/hypotension. Pt afebrile. CXR with no acute process.   -Pt with leukocytosis, suspect reactive. Low suspicion for infection at this time.   -Hypotension improved with fluids  -Continue IVF  -Echo ordered  -Will monitor off abx at this time   -repeat labs in the AM     DC abx    Hypotension  -BP as low as 63/41 in the ED  -Suspect due to pt continuing home meds (amolidpine-olmarsartan and chlorthalidone) in setting of LAM and decreased PO intake/dehydration  -Hypotension improved with fluids  -Continue IVF  -Pt with leukocytosis, suspect reactive. Low suspicion for infection at this time.   -Echo ordered  -Hold home BP meds as above  -Monitor     Dehydration  -Continue IVF as above  -See plan for LAM    Tobacco abuse  - Patient was counseled regarding smoking for 3-10 minutes.  - Encourage smoking cessation daily  - NRT offered     Tobacco dependence         Heroin abuse  · Negative on admission, but since IVDU hx higher risk for bacteremia      Hypokalemia- will replace     VTE Risk Mitigation (From admission, onward)         Ordered     apixaban tablet 5 mg  2 times daily         08/08/23 1634     IP VTE LOW RISK PATIENT  Once         08/07/23 0211     Place sequential compression device  Until discontinued         08/07/23 0211                Discharge Planning   ELODIA:      Code Status: Full Code   Is the patient medically ready for discharge?:     Reason for patient still in hospital (select all that apply): Patient unstable  Discharge Plan A: Home with family                  Hudson Skaggs MD  Department of Hospital Medicine   [facility] - Med Surg

## 2023-08-12 NOTE — ASSESSMENT & PLAN NOTE
-WBC# 16.07 on admission w/hypotension. Pt afebrile. CXR with no acute process.   -Pt with leukocytosis, suspect reactive. Low suspicion for infection at this time.   -Hypotension improved with fluids  -Continue IVF  -Echo ordered  -Will monitor off abx at this time   -repeat labs in the AM     DC abx

## 2023-08-12 NOTE — ASSESSMENT & PLAN NOTE
Now back in SR after KASHIF/DCCV and repeat DCCV.  Given LAM and prolonged QTc, will stop sotalol.  Cont DOAC  Check Priscilla MPI today, if neg will start flecainide, likely in AM if creat clearance continues to improve.  Amio as contingency.  Repeat EKG in am.

## 2023-08-12 NOTE — PLAN OF CARE
Problem: Adult Inpatient Plan of Care  Goal: Plan of Care Review  Outcome: Ongoing, Progressing  Goal: Absence of Hospital-Acquired Illness or Injury  Outcome: Ongoing, Progressing  Goal: Optimal Comfort and Wellbeing  Outcome: Ongoing, Progressing     Problem: Renal Function Impairment (Acute Kidney Injury/Impairment)  Goal: Effective Renal Function  Outcome: Ongoing, Progressing     Problem: Fall Injury Risk  Goal: Absence of Fall and Fall-Related Injury  Outcome: Ongoing, Progressing     Problem: Adjustment to Illness (Sepsis/Septic Shock)  Goal: Optimal Coping  Outcome: Ongoing, Progressing     Problem: Bleeding (Sepsis/Septic Shock)  Goal: Absence of Bleeding  Outcome: Ongoing, Progressing     Problem: Glycemic Control Impaired (Sepsis/Septic Shock)  Goal: Blood Glucose Level Within Desired Range  Outcome: Ongoing, Progressing     Problem: Infection Progression (Sepsis/Septic Shock)  Goal: Absence of Infection Signs and Symptoms  Outcome: Ongoing, Progressing

## 2023-08-12 NOTE — ASSESSMENT & PLAN NOTE
Likely secondry to dehydration. Continue to monitor response to iv hydration. Hold anti hypertensives. Avoid nephrotoxic agents

## 2023-08-12 NOTE — ASSESSMENT & PLAN NOTE
Mgmt per IM/neph, creat improving.  Estimated Creatinine Clearance: 30.6 mL/min (A) (based on SCr of 2.7 mg/dL (H)).

## 2023-08-12 NOTE — PROGRESS NOTES
Vivek Whitman is a 62 y.o. male patient.    Follow for LAM    Reportedly feeling better  No new c/o, comfortable    Scheduled Meds:   albuterol-ipratropium  3 mL Nebulization Q6H    allopurinoL  100 mg Oral Daily    apixaban  5 mg Oral BID    aspirin  81 mg Oral Daily    atorvastatin  40 mg Oral QHS    doxycycline  100 mg Oral Q12H    famotidine  20 mg Oral Daily    metoprolol  5 mg Intravenous Once    mupirocin   Nasal BID    nicotine  1 patch Transdermal Daily    potassium chloride  60 mEq Oral Once       Review of patient's allergies indicates:  No Known Allergies      Vital Signs Range (Last 24H):  Temp:  [98 °F (36.7 °C)-98.9 °F (37.2 °C)]   Pulse:  [58-73]   Resp:  [18-38]   BP: (126-174)/(58-89)   SpO2:  [91 %-98 %]     I & O (Last 24H):  Intake/Output Summary (Last 24 hours) at 8/12/2023 0930  Last data filed at 8/12/2023 0830  Gross per 24 hour   Intake 1187.55 ml   Output 4025 ml   Net -2837.45 ml           Physical Exam:  General appearance: well developed, well nourished, no distress  Lungs:  clear to auscultation bilaterally and normal respiratory effort  Heart: regular rate and rhythm  Abdomen:  soft, normal bowel sounds  Extremities: edema (-)    Laboratory:  I have reviewed all pertinent lab results within the past 24 hours.  CBC:   Recent Labs   Lab 08/12/23 0426   WBC 9.54   RBC 4.38*   HGB 13.2*   HCT 38.8*      MCV 89   MCH 30.1   MCHC 34.0     CMP:   Recent Labs   Lab 08/12/23 0426   GLU 92   CALCIUM 8.8   ALBUMIN 1.9*   PROT 5.9*      K 3.1*   CO2 29   CL 97   BUN 47*   CREATININE 2.7*   ALKPHOS 79   ALT 54*   AST 68*   BILITOT 0.8     Microbiology Results (last 7 days)       Procedure Component Value Units Date/Time    Blood culture [[id number]] Collected: 08/08/23 0819    Order Status: Completed Specimen: Blood Updated: 08/12/23 0903     Blood Culture, Routine No Growth after 4 days.    Blood culture [[id number]] Collected: 08/08/23 0807    Order Status: Completed Specimen:  Blood from Peripheral, Right Hand Updated: 08/12/23 0903     Blood Culture, Routine No Growth after 4 days.            Assessment & Plan    LAM - improving, non oliguric  HTN  PAF    Continue IVF  Replace K  Watch renal function      Trac T Le  8/12/2023

## 2023-08-12 NOTE — ASSESSMENT & PLAN NOTE
Patient with acute kidney injury/acute renal failure likely due to pre-renal azotemia due to dehydration LAM is currently worsening. Baseline creatinine unknown - Labs reviewed- Renal function/electrolytes with Estimated Creatinine Clearance: 30.6 mL/min (A) (based on SCr of 2.7 mg/dL (H)). according to latest data. Monitor urine output and serial BMP and adjust therapy as needed. Avoid nephrotoxins and renally dose meds for GFR listed above.    -Presented with LAM  -Cr on admit 7.4  -Baseline Cr unknown, but Cr 1.1 in 08/2022  -Suspect due to decreased PO intake and home meds (amolidpine-olmarsartan and chlorthalidone)  -Renal US with no obstruction. Findings consistent with CKD  -IVF  -Nephrology consulted: check FENA, pthi, and uric acid  -Avoid nephrotoxins, renal dose all meds.   -Monitor Is/Os  -Monitor     Improving CRT daily

## 2023-08-12 NOTE — PROGRESS NOTES
[facility]  Cardiology  Progress Note    Patient Name: Vivek Whitman  MRN: [id number]  Admission Date: 8/6/2023  Hospital Length of Stay: 5 days  Code Status: Full Code   Attending Physician: Hudson Ayon MD   Primary Care Physician: No, Primary Doctor  Expected Discharge Date:   Principal Problem:ARF (acute renal failure)    Subjective:     Interval Hx: case d/w RN. No cp/sob.  Neg MPI yesterday.          Review of Systems   Gastrointestinal:  Negative for melena.   Genitourinary:  Negative for hematuria.     Objective:     Vital Signs (Most Recent):  Temp: 98.5 °F (36.9 °C) (08/12/23 1100)  Pulse: 62 (08/12/23 1316)  Resp: 18 (08/12/23 1316)  BP: (!) 142/75 (08/12/23 1100)  SpO2: (!) 93 % (08/12/23 1316) Vital Signs (24h Range):  Temp:  [98 °F (36.7 °C)-98.9 °F (37.2 °C)] 98.5 °F (36.9 °C)  Pulse:  [59-73] 62  Resp:  [18-38] 18  SpO2:  [91 %-98 %] 93 %  BP: (132-156)/(65-89) 142/75     Weight: 93 kg (205 lb)  Body mass index is 31.17 kg/m².     SpO2: (!) 93 %         Intake/Output Summary (Last 24 hours) at 8/12/2023 1402  Last data filed at 8/12/2023 1314  Gross per 24 hour   Intake 726.45 ml   Output 3525 ml   Net -2798.55 ml         Lines/Drains/Airways       Peripheral Intravenous Line  Duration                  Peripheral IV - Single Lumen 08/09/23 1900 20 G Left;Posterior;Proximal Forearm 2 days         Peripheral IV - Single Lumen 08/09/23 1900 22 G Distal;Posterior;Right Forearm 2 days                     Exam unchanged vs 8/11/23  Physical Exam  Constitutional:       General: He is not in acute distress.     Appearance: He is well-developed. He is obese. He is not ill-appearing, toxic-appearing or diaphoretic.   HENT:      Head: Normocephalic and atraumatic.   Eyes:      General: No scleral icterus.     Extraocular Movements: Extraocular movements intact.      Conjunctiva/sclera: Conjunctivae normal.      Pupils: Pupils are equal, round, and reactive to light.   Neck:      Thyroid: No  thyromegaly.      Vascular: No JVD.      Trachea: No tracheal deviation.   Cardiovascular:      Rate and Rhythm: Regular rhythm. Bradycardia present.      Heart sounds: S1 normal and S2 normal. Murmur heard.      Systolic murmur is present with a grade of 2/6.      No friction rub. No gallop.   Pulmonary:      Effort: Pulmonary effort is normal. No respiratory distress.      Breath sounds: Normal breath sounds. No stridor. No wheezing, rhonchi or rales.   Chest:      Chest wall: No tenderness.   Abdominal:      General: There is no distension.      Palpations: Abdomen is soft.   Musculoskeletal:         General: No swelling or tenderness. Normal range of motion.      Cervical back: Normal range of motion and neck supple. No rigidity.      Right lower leg: No edema.      Left lower leg: No edema.   Skin:     General: Skin is warm and dry.      Coloration: Skin is not jaundiced.      Findings: No rash.   Neurological:      General: No focal deficit present.      Mental Status: He is alert and oriented to person, place, and time.      Cranial Nerves: No cranial nerve deficit.   Psychiatric:         Mood and Affect: Mood normal.         Behavior: Behavior normal.            Current Medications:   albuterol-ipratropium  3 mL Nebulization Q6H    allopurinoL  100 mg Oral Daily    apixaban  5 mg Oral BID    aspirin  81 mg Oral Daily    atorvastatin  40 mg Oral QHS    doxycycline  100 mg Oral Q12H    famotidine  20 mg Oral Daily    metoprolol  5 mg Intravenous Once    mupirocin   Nasal BID    nicotine  1 patch Transdermal Daily      lactated ringers 125 mL/hr at 08/12/23 0907     aluminum-magnesium hydroxide-simethicone, dextrose 50%, dextrose 50%, glucagon (human recombinant), glucose, glucose, naloxone, ondansetron, sodium chloride 0.9%    Laboratory (all labs reviewed):  CBC:  Recent Labs   Lab 08/07/23  1936 08/08/23  0522 08/09/23  0601 08/11/23  0326 08/12/23  0426   WBC 15.37 H 13.16 H  13.16 H 13.06 H  11.40 9.54   Hemoglobin 13.0 L 10.8 L  10.8 L 12.3 L 12.0 L 13.2 L   Hematocrit 37.3 L 31.6 L  31.6 L 35.8 L 36.0 L 38.8 L   Platelets 186 169  169 180 195 201         CHEMISTRIES:  Recent Labs   Lab 03/14/21  0438 03/15/21  0410 03/17/21  1110 08/09/23  0601 08/10/23  0603 08/10/23  1602 08/11/23  0326 08/12/23  0426   Glucose 99 81   < > 107 114 H 121 H 109 92   Sodium 138 137   < > 134 L 134 L 134 L 136 137   Potassium 3.7 4.1   < > 3.6 2.9 L 3.5 3.5 3.1 L   BUN 13 18   < > 80 H 63 H 56 H 50 H 47 H   Creatinine 0.8 0.8   < > 6.1 H 4.6 H 3.9 H 3.5 H 2.7 H   eGFR if non  >60 >60  --   --   --   --   --   --    eGFR  --   --    < > 10 A 13 A 16 A 19 A 26 A   Calcium 8.6 L 8.7   < > 8.5 L 8.5 L 8.5 L 8.5 L 8.8   Magnesium  --   --    < > 1.6 1.3 L 1.6 1.9 1.3 L    < > = values in this interval not displayed.         CARDIAC BIOMARKERS:  Recent Labs   Lab 03/13/21  1816 08/07/23  1202   CPK  --  58   Troponin I 0.016  --          COAGS:  Recent Labs   Lab 12/24/20  1236 03/13/21  1506 08/07/23  1936   INR 1.0 1.0 1.3 H         LIPIDS/LFTS:  Recent Labs   Lab 03/15/21  0410 03/17/21  1110 07/22/21  1931 08/29/22  1224 08/07/23  1202 08/11/23  0326 08/12/23  0426   Cholesterol 130  --   --   --   --   --   --    Triglycerides 98  --   --   --   --   --   --    HDL 35 L  --   --   --   --   --   --    LDL Cholesterol 75.4  --   --   --   --   --   --    Non-HDL Cholesterol 95  --   --   --   --   --   --    AST  --    < > 98 H 126 H 66 H  66 H 47 H 68 H   ALT  --    < > 117 H 202 H 73 H  73 H 48 H 54 H    < > = values in this interval not displayed.         BNP:        TSH:  Recent Labs   Lab 03/14/21  0438   TSH 3.152         Free T4:        Diagnostic Results:  ECG (personally reviewed and interpreted tracing(s)):  8/10/23 1037 , NSSTTW changes  8/11/23 0837 SR 56, QTc 528 (baseline 478)    L MPI 8/11/23 (images personally reviewed and interpreted)    Normal myocardial perfusion scan. There  is no evidence of myocardial ischemia or infarction.    The gated perfusion images showed an ejection fraction of 64% post stress.    There is normal wall motion post stress.    KASHIF/DCCV 8/8/23    Left Ventricle: Normal wall motion. There is normal systolic function with a visually estimated ejection fraction of 55 - 60%.    Left Atrium: Left atrium is moderately dilated. There is no thrombus in the left atrial appendage.    Right Ventricle: Right ventricle was not well visualized due to poor acoustic window.    Aortic Valve: There is mild aortic valve sclerosis.    Mitral Valve: There is mild regurgitation.    Tricuspid Valve: There is mild regurgitation.    The pre-cardioversion rhythm was atrial fibrillation. A 200 J synchronized cardioversion was successfully performed with restoration of normal sinus rhythm. The post-cardioversion rhythm was normal sinus rhythm.    Echo: 8/7/23    Left Ventricle: The left ventricle is normal in size. Mildly increased wall thickness. Normal wall motion. There is normal systolic function with a visually estimated ejection fraction of 65 - 70%. Grade I diastolic dysfunction.    Left Atrium: Left atrium is severely dilated.    Right Ventricle: Normal right ventricular cavity size. Systolic function is normal.    Right Atrium: Right atrium is moderately dilated.    Aortic Valve: There is aortic valve sclerosis.    Tricuspid Valve: There is mild regurgitation.    Pulmonary Artery: The estimated pulmonary artery systolic pressure is 23 mmHg.    IVC/SVC: Normal venous pressure at 3 mmHg.        Assessment and Plan:     * ARF (acute renal failure)  Mgmt per IM/neph, creat improving.  Estimated Creatinine Clearance: 30.6 mL/min (A) (based on SCr of 2.7 mg/dL (H)).      Paroxysmal atrial fibrillation  Now back in SR after KASHIF/DCCV and repeat DCCV.  Given LAM and prolonged QTc, will stop sotalol.  Cont DOAC  Check Priscilla MPI today, if neg will start flecainide, likely in AM  if creat clearance continues to improve.  Amio as contingency.  Repeat EKG in am.    Heroin abuse  Abstinence recommended    Tobacco abuse  Cessation recommended      Sepsis  Mgmt per IM    Hypotension  Likely secondry to dehydration. Continue to monitor response to iv hydration. Hold anti hypertensives. Avoid nephrotoxic agents    Dehydration  Iv hydration        VTE Risk Mitigation (From admission, onward)         Ordered     apixaban tablet 5 mg  2 times daily         08/08/23 1634     IP VTE LOW RISK PATIENT  Once         08/07/23 0211     Place sequential compression device  Until discontinued         08/07/23 0211                Win Caceres MD  Cardiology  [facility]

## 2023-08-12 NOTE — ASSESSMENT & PLAN NOTE
- Patient was counseled regarding smoking for 3-10 minutes.  - Encourage smoking cessation daily  - NRT offered     Tobacco dependence

## 2023-08-12 NOTE — SUBJECTIVE & OBJECTIVE
Interval History: No new issues     Review of Systems   Constitutional:  Negative for activity change.   HENT:  Negative for congestion.    Respiratory:  Negative for apnea.    Gastrointestinal:  Negative for abdominal distention.   Genitourinary:  Negative for difficulty urinating.   Neurological:  Negative for dizziness.     Objective:     Vital Signs (Most Recent):  Temp: 98.1 °F (36.7 °C) (08/12/23 0701)  Pulse: 65 (08/12/23 0737)  Resp: 18 (08/12/23 0737)  BP: (!) 153/74 (08/12/23 0701)  SpO2: (!) 91 % (08/12/23 0737) Vital Signs (24h Range):  Temp:  [98 °F (36.7 °C)-98.9 °F (37.2 °C)] 98.1 °F (36.7 °C)  Pulse:  [58-73] 65  Resp:  [18-38] 18  SpO2:  [91 %-98 %] 91 %  BP: (126-174)/(58-89) 153/74     Weight: 93 kg (205 lb)  Body mass index is 31.17 kg/m².    Intake/Output Summary (Last 24 hours) at 8/12/2023 0902  Last data filed at 8/12/2023 0830  Gross per 24 hour   Intake 1187.55 ml   Output 4025 ml   Net -2837.45 ml         Physical Exam  Constitutional:       Appearance: Normal appearance. He is not ill-appearing.   Cardiovascular:      Rate and Rhythm: Normal rate and regular rhythm.   Pulmonary:      Effort: Pulmonary effort is normal. No respiratory distress.   Musculoskeletal:         General: No swelling.   Skin:     Coloration: Skin is not jaundiced.   Neurological:      Mental Status: He is alert.             Significant Labs: All pertinent labs within the past 24 hours have been reviewed.  BMP:   Recent Labs   Lab 08/12/23 0426   GLU 92      K 3.1*   CL 97   CO2 29   BUN 47*   CREATININE 2.7*   CALCIUM 8.8   MG 1.3*     CBC:   Recent Labs   Lab 08/11/23  0326 08/12/23 0426   WBC 11.40 9.54   HGB 12.0* 13.2*   HCT 36.0* 38.8*    201       Significant Imaging: I have reviewed all pertinent imaging results/findings within the past 24 hours.

## 2023-08-12 NOTE — SUBJECTIVE & OBJECTIVE
Review of Systems   Gastrointestinal:  Negative for melena.   Genitourinary:  Negative for hematuria.     Objective:     Vital Signs (Most Recent):  Temp: 98.5 °F (36.9 °C) (08/12/23 1100)  Pulse: 62 (08/12/23 1316)  Resp: 18 (08/12/23 1316)  BP: (!) 142/75 (08/12/23 1100)  SpO2: (!) 93 % (08/12/23 1316) Vital Signs (24h Range):  Temp:  [98 °F (36.7 °C)-98.9 °F (37.2 °C)] 98.5 °F (36.9 °C)  Pulse:  [59-73] 62  Resp:  [18-38] 18  SpO2:  [91 %-98 %] 93 %  BP: (132-156)/(65-89) 142/75     Weight: 93 kg (205 lb)  Body mass index is 31.17 kg/m².     SpO2: (!) 93 %         Intake/Output Summary (Last 24 hours) at 8/12/2023 1402  Last data filed at 8/12/2023 1314  Gross per 24 hour   Intake 726.45 ml   Output 3525 ml   Net -2798.55 ml         Lines/Drains/Airways       Peripheral Intravenous Line  Duration                  Peripheral IV - Single Lumen 08/09/23 1900 20 G Left;Posterior;Proximal Forearm 2 days         Peripheral IV - Single Lumen 08/09/23 1900 22 G Distal;Posterior;Right Forearm 2 days                     Exam unchanged vs 8/11/23  Physical Exam  Constitutional:       General: He is not in acute distress.     Appearance: He is well-developed. He is obese. He is not ill-appearing, toxic-appearing or diaphoretic.   HENT:      Head: Normocephalic and atraumatic.   Eyes:      General: No scleral icterus.     Extraocular Movements: Extraocular movements intact.      Conjunctiva/sclera: Conjunctivae normal.      Pupils: Pupils are equal, round, and reactive to light.   Neck:      Thyroid: No thyromegaly.      Vascular: No JVD.      Trachea: No tracheal deviation.   Cardiovascular:      Rate and Rhythm: Regular rhythm. Bradycardia present.      Heart sounds: S1 normal and S2 normal. Murmur heard.      Systolic murmur is present with a grade of 2/6.      No friction rub. No gallop.   Pulmonary:      Effort: Pulmonary effort is normal. No respiratory distress.      Breath sounds: Normal breath sounds. No stridor.  No wheezing, rhonchi or rales.   Chest:      Chest wall: No tenderness.   Abdominal:      General: There is no distension.      Palpations: Abdomen is soft.   Musculoskeletal:         General: No swelling or tenderness. Normal range of motion.      Cervical back: Normal range of motion and neck supple. No rigidity.      Right lower leg: No edema.      Left lower leg: No edema.   Skin:     General: Skin is warm and dry.      Coloration: Skin is not jaundiced.      Findings: No rash.   Neurological:      General: No focal deficit present.      Mental Status: He is alert and oriented to person, place, and time.      Cranial Nerves: No cranial nerve deficit.   Psychiatric:         Mood and Affect: Mood normal.         Behavior: Behavior normal.            Current Medications:   albuterol-ipratropium  3 mL Nebulization Q6H    allopurinoL  100 mg Oral Daily    apixaban  5 mg Oral BID    aspirin  81 mg Oral Daily    atorvastatin  40 mg Oral QHS    doxycycline  100 mg Oral Q12H    famotidine  20 mg Oral Daily    metoprolol  5 mg Intravenous Once    mupirocin   Nasal BID    nicotine  1 patch Transdermal Daily      lactated ringers 125 mL/hr at 08/12/23 0907     aluminum-magnesium hydroxide-simethicone, dextrose 50%, dextrose 50%, glucagon (human recombinant), glucose, glucose, naloxone, ondansetron, sodium chloride 0.9%    Laboratory (all labs reviewed):  CBC:  Recent Labs   Lab 08/07/23  1936 08/08/23  0522 08/09/23  0601 08/11/23  0326 08/12/23  0426   WBC 15.37 H 13.16 H  13.16 H 13.06 H 11.40 9.54   Hemoglobin 13.0 L 10.8 L  10.8 L 12.3 L 12.0 L 13.2 L   Hematocrit 37.3 L 31.6 L  31.6 L 35.8 L 36.0 L 38.8 L   Platelets 186 169  169 180 195 201         CHEMISTRIES:  Recent Labs   Lab 03/14/21  0438 03/15/21  0410 03/17/21  1110 08/09/23  0601 08/10/23  0603 08/10/23  1602 08/11/23  0326 08/12/23  0426   Glucose 99 81   < > 107 114 H 121 H 109 92   Sodium 138 137   < > 134 L 134 L 134 L 136 137   Potassium 3.7 4.1   <  > 3.6 2.9 L 3.5 3.5 3.1 L   BUN 13 18   < > 80 H 63 H 56 H 50 H 47 H   Creatinine 0.8 0.8   < > 6.1 H 4.6 H 3.9 H 3.5 H 2.7 H   eGFR if non  >60 >60  --   --   --   --   --   --    eGFR  --   --    < > 10 A 13 A 16 A 19 A 26 A   Calcium 8.6 L 8.7   < > 8.5 L 8.5 L 8.5 L 8.5 L 8.8   Magnesium  --   --    < > 1.6 1.3 L 1.6 1.9 1.3 L    < > = values in this interval not displayed.         CARDIAC BIOMARKERS:  Recent Labs   Lab 03/13/21  1816 08/07/23  1202   CPK  --  58   Troponin I 0.016  --          COAGS:  Recent Labs   Lab 12/24/20  1236 03/13/21  1506 08/07/23  1936   INR 1.0 1.0 1.3 H         LIPIDS/LFTS:  Recent Labs   Lab 03/15/21  0410 03/17/21  1110 07/22/21  1931 08/29/22  1224 08/07/23  1202 08/11/23  0326 08/12/23  0426   Cholesterol 130  --   --   --   --   --   --    Triglycerides 98  --   --   --   --   --   --    HDL 35 L  --   --   --   --   --   --    LDL Cholesterol 75.4  --   --   --   --   --   --    Non-HDL Cholesterol 95  --   --   --   --   --   --    AST  --    < > 98 H 126 H 66 H  66 H 47 H 68 H   ALT  --    < > 117 H 202 H 73 H  73 H 48 H 54 H    < > = values in this interval not displayed.         BNP:        TSH:  Recent Labs   Lab 03/14/21  0438   TSH 3.152         Free T4:        Diagnostic Results:  ECG (personally reviewed and interpreted tracing(s)):  8/10/23 1037 , NSSTTW changes  8/11/23 0837 SR 56, QTc 528 (baseline 478)    L MPI 8/11/23 (images personally reviewed and interpreted)    Normal myocardial perfusion scan. There is no evidence of myocardial ischemia or infarction.    The gated perfusion images showed an ejection fraction of 64% post stress.    There is normal wall motion post stress.    KASHIF/DCCV 8/8/23    Left Ventricle: Normal wall motion. There is normal systolic function with a visually estimated ejection fraction of 55 - 60%.    Left Atrium: Left atrium is moderately dilated. There is no thrombus in the left atrial appendage.    Right  Ventricle: Right ventricle was not well visualized due to poor acoustic window.    Aortic Valve: There is mild aortic valve sclerosis.    Mitral Valve: There is mild regurgitation.    Tricuspid Valve: There is mild regurgitation.    The pre-cardioversion rhythm was atrial fibrillation. A 200 J synchronized cardioversion was successfully performed with restoration of normal sinus rhythm. The post-cardioversion rhythm was normal sinus rhythm.    Echo: 8/7/23    Left Ventricle: The left ventricle is normal in size. Mildly increased wall thickness. Normal wall motion. There is normal systolic function with a visually estimated ejection fraction of 65 - 70%. Grade I diastolic dysfunction.    Left Atrium: Left atrium is severely dilated.    Right Ventricle: Normal right ventricular cavity size. Systolic function is normal.    Right Atrium: Right atrium is moderately dilated.    Aortic Valve: There is aortic valve sclerosis.    Tricuspid Valve: There is mild regurgitation.    Pulmonary Artery: The estimated pulmonary artery systolic pressure is 23 mmHg.    IVC/SVC: Normal venous pressure at 3 mmHg.

## 2023-08-12 NOTE — NURSING
[facility] [place]urses [place] -- 4 Eyes  Lying in bed, LRS 125ml/hr infusing. No complaint of pain, SOB, N/V.     8/12/2023       Skin assessed on: Q Shift      [x] No Pressure Injuries Present    [x]Prevention Measures Documented  Nonblanchable redness on bottocks. Applied Mepelex.  [] Yes LDA  for Pressure Injury Previously documented     [] Yes New Pressure Injury Discovered   [] LDA for New Pressure Injury Added      Attending RN:  Sherley Oconnor RN     Second RN:  Ani Gould RN

## 2023-08-13 LAB
ALBUMIN SERPL BCP-MCNC: 2.1 G/DL (ref 3.5–5.2)
ALP SERPL-CCNC: 70 U/L (ref 55–135)
ALT SERPL W/O P-5'-P-CCNC: 65 U/L (ref 10–44)
ANION GAP SERPL CALC-SCNC: 9 MMOL/L (ref 8–16)
AST SERPL-CCNC: 78 U/L (ref 10–40)
BASOPHILS # BLD AUTO: 0.06 K/UL (ref 0–0.2)
BASOPHILS NFR BLD: 0.7 % (ref 0–1.9)
BILIRUB SERPL-MCNC: 0.8 MG/DL (ref 0.1–1)
BUN SERPL-MCNC: 34 MG/DL (ref 8–23)
CALCIUM SERPL-MCNC: 8.7 MG/DL (ref 8.7–10.5)
CHLORIDE SERPL-SCNC: 99 MMOL/L (ref 95–110)
CO2 SERPL-SCNC: 30 MMOL/L (ref 23–29)
CREAT SERPL-MCNC: 2.1 MG/DL (ref 0.5–1.4)
DIFFERENTIAL METHOD: ABNORMAL
EOSINOPHIL # BLD AUTO: 0.3 K/UL (ref 0–0.5)
EOSINOPHIL NFR BLD: 3.9 % (ref 0–8)
ERYTHROCYTE [DISTWIDTH] IN BLOOD BY AUTOMATED COUNT: 13.9 % (ref 11.5–14.5)
EST. GFR  (NO RACE VARIABLE): 35 ML/MIN/1.73 M^2
GLUCOSE SERPL-MCNC: 93 MG/DL (ref 70–110)
HCT VFR BLD AUTO: 38.1 % (ref 40–54)
HGB BLD-MCNC: 12.6 G/DL (ref 14–18)
IMM GRANULOCYTES # BLD AUTO: 0.07 K/UL (ref 0–0.04)
IMM GRANULOCYTES NFR BLD AUTO: 0.8 % (ref 0–0.5)
LYMPHOCYTES # BLD AUTO: 1.7 K/UL (ref 1–4.8)
LYMPHOCYTES NFR BLD: 20.7 % (ref 18–48)
MAGNESIUM SERPL-MCNC: 1 MG/DL (ref 1.6–2.6)
MCH RBC QN AUTO: 30.1 PG (ref 27–31)
MCHC RBC AUTO-ENTMCNC: 33.1 G/DL (ref 32–36)
MCV RBC AUTO: 91 FL (ref 82–98)
MONOCYTES # BLD AUTO: 1 K/UL (ref 0.3–1)
MONOCYTES NFR BLD: 11.8 % (ref 4–15)
NEUTROPHILS # BLD AUTO: 5.1 K/UL (ref 1.8–7.7)
NEUTROPHILS NFR BLD: 62.1 % (ref 38–73)
NRBC BLD-RTO: 0 /100 WBC
PHOSPHATE SERPL-MCNC: 3.1 MG/DL (ref 2.7–4.5)
PLATELET # BLD AUTO: 220 K/UL (ref 150–450)
PMV BLD AUTO: 9.4 FL (ref 9.2–12.9)
POTASSIUM SERPL-SCNC: 3.2 MMOL/L (ref 3.5–5.1)
PROT SERPL-MCNC: 5.8 G/DL (ref 6–8.4)
RBC # BLD AUTO: 4.19 M/UL (ref 4.6–6.2)
SODIUM SERPL-SCNC: 138 MMOL/L (ref 136–145)
WBC # BLD AUTO: 8.28 K/UL (ref 3.9–12.7)

## 2023-08-13 PROCEDURE — 63600175 PHARM REV CODE 636 W HCPCS: Performed by: INTERNAL MEDICINE

## 2023-08-13 PROCEDURE — 80053 COMPREHEN METABOLIC PANEL: CPT | Performed by: STUDENT IN AN ORGANIZED HEALTH CARE EDUCATION/TRAINING PROGRAM

## 2023-08-13 PROCEDURE — 25000003 PHARM REV CODE 250: Performed by: STUDENT IN AN ORGANIZED HEALTH CARE EDUCATION/TRAINING PROGRAM

## 2023-08-13 PROCEDURE — 25000242 PHARM REV CODE 250 ALT 637 W/ HCPCS: Performed by: INTERNAL MEDICINE

## 2023-08-13 PROCEDURE — 25000003 PHARM REV CODE 250: Performed by: INTERNAL MEDICINE

## 2023-08-13 PROCEDURE — 93010 EKG 12-LEAD: ICD-10-PCS | Mod: ,,, | Performed by: INTERNAL MEDICINE

## 2023-08-13 PROCEDURE — 84100 ASSAY OF PHOSPHORUS: CPT | Performed by: STUDENT IN AN ORGANIZED HEALTH CARE EDUCATION/TRAINING PROGRAM

## 2023-08-13 PROCEDURE — 83735 ASSAY OF MAGNESIUM: CPT | Performed by: STUDENT IN AN ORGANIZED HEALTH CARE EDUCATION/TRAINING PROGRAM

## 2023-08-13 PROCEDURE — 36415 COLL VENOUS BLD VENIPUNCTURE: CPT | Performed by: STUDENT IN AN ORGANIZED HEALTH CARE EDUCATION/TRAINING PROGRAM

## 2023-08-13 PROCEDURE — 94761 N-INVAS EAR/PLS OXIMETRY MLT: CPT

## 2023-08-13 PROCEDURE — 93005 ELECTROCARDIOGRAM TRACING: CPT

## 2023-08-13 PROCEDURE — S4991 NICOTINE PATCH NONLEGEND: HCPCS | Performed by: INTERNAL MEDICINE

## 2023-08-13 PROCEDURE — 99291 CRITICAL CARE FIRST HOUR: CPT | Mod: ,,, | Performed by: INTERNAL MEDICINE

## 2023-08-13 PROCEDURE — 85025 COMPLETE CBC W/AUTO DIFF WBC: CPT | Performed by: STUDENT IN AN ORGANIZED HEALTH CARE EDUCATION/TRAINING PROGRAM

## 2023-08-13 PROCEDURE — 94640 AIRWAY INHALATION TREATMENT: CPT

## 2023-08-13 PROCEDURE — 20000000 HC ICU ROOM

## 2023-08-13 PROCEDURE — 99291 PR CRITICAL CARE, E/M 30-74 MINUTES: ICD-10-PCS | Mod: ,,, | Performed by: INTERNAL MEDICINE

## 2023-08-13 PROCEDURE — 93010 ELECTROCARDIOGRAM REPORT: CPT | Mod: ,,, | Performed by: INTERNAL MEDICINE

## 2023-08-13 PROCEDURE — 27000221 HC OXYGEN, UP TO 24 HOURS

## 2023-08-13 RX ORDER — METOPROLOL SUCCINATE 25 MG/1
25 TABLET, EXTENDED RELEASE ORAL DAILY
Status: DISCONTINUED | OUTPATIENT
Start: 2023-08-13 | End: 2023-08-15 | Stop reason: HOSPADM

## 2023-08-13 RX ORDER — FLECAINIDE ACETATE 50 MG/1
100 TABLET ORAL EVERY 12 HOURS
Status: DISCONTINUED | OUTPATIENT
Start: 2023-08-13 | End: 2023-08-15 | Stop reason: HOSPADM

## 2023-08-13 RX ORDER — DILTIAZEM HCL 1 MG/ML
0-15 INJECTION, SOLUTION INTRAVENOUS CONTINUOUS
Status: DISCONTINUED | OUTPATIENT
Start: 2023-08-13 | End: 2023-08-14

## 2023-08-13 RX ORDER — METOPROLOL TARTRATE 1 MG/ML
5 INJECTION, SOLUTION INTRAVENOUS ONCE
Status: COMPLETED | OUTPATIENT
Start: 2023-08-13 | End: 2023-08-13

## 2023-08-13 RX ORDER — METOPROLOL TARTRATE 1 MG/ML
5 INJECTION, SOLUTION INTRAVENOUS ONCE
Status: DISCONTINUED | OUTPATIENT
Start: 2023-08-13 | End: 2023-08-13

## 2023-08-13 RX ADMIN — SODIUM CHLORIDE, POTASSIUM CHLORIDE, SODIUM LACTATE AND CALCIUM CHLORIDE: 600; 310; 30; 20 INJECTION, SOLUTION INTRAVENOUS at 02:08

## 2023-08-13 RX ADMIN — DOXYCYCLINE HYCLATE 100 MG: 100 TABLET, COATED ORAL at 08:08

## 2023-08-13 RX ADMIN — ASPIRIN 81 MG: 81 TABLET, COATED ORAL at 08:08

## 2023-08-13 RX ADMIN — APIXABAN 5 MG: 5 TABLET, FILM COATED ORAL at 08:08

## 2023-08-13 RX ADMIN — ALLOPURINOL 100 MG: 100 TABLET ORAL at 08:08

## 2023-08-13 RX ADMIN — IPRATROPIUM BROMIDE AND ALBUTEROL SULFATE 3 ML: 2.5; .5 SOLUTION RESPIRATORY (INHALATION) at 12:08

## 2023-08-13 RX ADMIN — SODIUM CHLORIDE, POTASSIUM CHLORIDE, SODIUM LACTATE AND CALCIUM CHLORIDE: 600; 310; 30; 20 INJECTION, SOLUTION INTRAVENOUS at 12:08

## 2023-08-13 RX ADMIN — SODIUM CHLORIDE, POTASSIUM CHLORIDE, SODIUM LACTATE AND CALCIUM CHLORIDE: 600; 310; 30; 20 INJECTION, SOLUTION INTRAVENOUS at 09:08

## 2023-08-13 RX ADMIN — ATORVASTATIN CALCIUM 40 MG: 40 TABLET, FILM COATED ORAL at 08:08

## 2023-08-13 RX ADMIN — IPRATROPIUM BROMIDE AND ALBUTEROL SULFATE 3 ML: 2.5; .5 SOLUTION RESPIRATORY (INHALATION) at 07:08

## 2023-08-13 RX ADMIN — FLECAINIDE ACETATE 100 MG: 50 TABLET ORAL at 08:08

## 2023-08-13 RX ADMIN — METOPROLOL SUCCINATE 25 MG: 25 TABLET, EXTENDED RELEASE ORAL at 05:08

## 2023-08-13 RX ADMIN — NICOTINE 1 PATCH: 7 PATCH, EXTENDED RELEASE TRANSDERMAL at 08:08

## 2023-08-13 RX ADMIN — FAMOTIDINE 20 MG: 20 TABLET, FILM COATED ORAL at 08:08

## 2023-08-13 RX ADMIN — FLECAINIDE ACETATE 100 MG: 50 TABLET ORAL at 11:08

## 2023-08-13 RX ADMIN — IPRATROPIUM BROMIDE AND ALBUTEROL SULFATE 3 ML: 2.5; .5 SOLUTION RESPIRATORY (INHALATION) at 01:08

## 2023-08-13 RX ADMIN — METOROPROLOL TARTRATE 5 MG: 5 INJECTION, SOLUTION INTRAVENOUS at 07:08

## 2023-08-13 RX ADMIN — IPRATROPIUM BROMIDE AND ALBUTEROL SULFATE 3 ML: 2.5; .5 SOLUTION RESPIRATORY (INHALATION) at 08:08

## 2023-08-13 RX ADMIN — DILTIAZEM HYDROCHLORIDE 5 MG/HR: 5 INJECTION INTRAVENOUS at 12:08

## 2023-08-13 NOTE — NURSING
[facility], [facility] - [place]  Nurses Note -- 4 Eyes      8/13/2023       Skin assessed on: Q Shift      [x] No Pressure Injuries Present    [x]Prevention Measures Documented    [] Yes LDA  for Pressure Injury Previously documented     [] Yes New Pressure Injury Discovered   [] LDA for New Pressure Injury Added      Attending RN:  Charlee Solo, RN     Second RN:  Lynda Pham RN

## 2023-08-13 NOTE — ASSESSMENT & PLAN NOTE
"This patient does not have evidence of infective focus  My overall impression is sepsis.  Source: Unknown  Antibiotics given-   Antibiotics (72h ago, onward)    Start     Stop Route Frequency Ordered    08/08/23 1015  doxycycline tablet 100 mg         -- Oral Every 12 hours 08/08/23 0901    08/08/23 0900  mupirocin 2 % ointment         08/13/23 0859 Nasl 2 times daily 08/08/23 0725        Latest lactate reviewed-  No results for input(s): "LACTATE" in the last 72 hours.  Organ dysfunction indicated by Acute kidney injury and New afib    Fluid challenge Actual Body weight- Patient will receive 30ml/kg actual body weight to calculate fluid bolus for treatment of septic shock.     Post- resuscitation assessment Yes Perfusion exam was performed within 6 hours of septic shock presentation after bolus shows Adequate tissue perfusion assessed by non-invasive monitoring       Will ordered but not started, responding to fluids Pressors- Levophed for MAP of 65  Source control achieved by: Abx and fluids    · Patient admitted with hypotension and leukocytosis, and feeling unwell for the past couple weeks.  Patient's blood pressure again low today at 70/49, concerns he could bacteremia or another infection.  No skin infections appreciated. Urine clean. No consolidation on CXR.  · Initial presentation of AFib (and ARF) often in the setting of infection due to catecholamine release and hypotension, as well.    · He is afebrile, however this does not exclude infection.    · Blood cultures taken.  , also concerning for an ongoing inflammatory issue/infection.    · Given acute renal failure, will hold off on vancomycin for now.    · Will initiate IV cefepime and oral doxycycline.   · Will await cultures to tailor antibiotics or deescalate.    · Unclear source, WBC normalized today 8/11, will transition to oral at d/c tomorrow to compete 7d course   "

## 2023-08-13 NOTE — PROGRESS NOTES
[facility] Medicine  Progress Note    Patient Name: Vivek Whitman  MRN: [id number]  Patient Class: IP- Inpatient   Admission Date: 8/6/2023  Length of Stay: 6 days  Attending Physician: Hudson Ayon MD  Primary Care Provider: Racheal, Primary Doctor        Subjective:     Principal Problem:ARF (acute renal failure)        HPI:   62 y.o. male, with a PMHx of HTN, HLD, tobacco abuse, and hx of heroin abuse/alcohol abuse transferred from [facility] ED for further evaluation. He presented to the ED with cocerns of low blood pressure. Stated for the past week, he had low blood pressure readings with SBP 90-100s and DBP 50s. Noted hypotension associated with feeling fatigue, weak, and make him just want to sleep all day. Stated he has not eat or drink much. However, continued to take his home BP medications. Tried to hydrate himself with gatorade prior to coming to the ED. Denies any fever, chest pain, cough, shortness of breath, abdominal pain, nausea or vomiting. Admits he still smokes 1ppd, smoke for the past 40+ years.     In the ED, pt hypotensive with BP 94/53. Patient given IVF. Labs significant for leukocytosis, elevated Cr of 7.4, and elevated phosphorus. US renal with no evidence for hydronephrosis bilaterally.  CXR with no focal consolidations or edema. Patient admitted to  for further evaluation.       Overview/Hospital Course:  Patient admitted with hypotension and leukocytosis, and feeling unwell for the past couple weeks.  Patient's blood pressure again low today at 70/49, concerns he could bacteremia or another infection.  Initial presentation of AFib (and ARF) often in the setting of infection due to catecholamine release and hypotension, as well.  He is afebrile, however this does not exclude infection. Hx of IVDU makes him higher risk. Blood cultures taken.  , also concerning for an ongoing inflammatory issue/infection.  Given acute renal failure, will hold off on vancomycin for  now.  Will initiate IV cefepime and oral doxycycline.  Will await cultures to tailor antibiotics or deescalate.  8 a.m. cortisol was WNL.  Creatinine slowly improving with fluids. S/p successful DCCV, now in NSR. Improving overall. Urine picking up. S/d to  floor.     Patient went back into AFib RVR successfully cardioverted again, in NSR now.  Sotalol initiated.  Electrolytes severely depleted this morning, may have contributed, will replace IV and recheck in the afternoon.  Urine output increased, 4 L/24 H. WBC# normalized. Still in NSR. Will ask PT/OT to assess, likely home with HH tomorrow if HR still controlled. PT/OT rec: Home. CRT improved. Stress test was negative     Patient transferred to the ICU on 8/13 for tachy and V-tach. Cards notified       Interval History: No new issues     Review of Systems   Constitutional:  Negative for activity change.   HENT:  Negative for congestion.    Respiratory:  Negative for apnea.    Gastrointestinal:  Negative for abdominal distention.   Genitourinary:  Negative for difficulty urinating.   Neurological:  Negative for dizziness.     Objective:     Vital Signs (Most Recent):  Temp: 98 °F (36.7 °C) (08/13/23 0659)  Pulse: 70 (08/13/23 0734)  Resp: 19 (08/13/23 0734)  BP: 117/66 (08/13/23 0714)  SpO2: (!) 90 % (08/13/23 0734) Vital Signs (24h Range):  Temp:  [98 °F (36.7 °C)-99.3 °F (37.4 °C)] 98 °F (36.7 °C)  Pulse:  [] 70  Resp:  [16-20] 19  SpO2:  [90 %-96 %] 90 %  BP: (117-148)/(64-86) 117/66     Weight: 93 kg (205 lb)  Body mass index is 31.17 kg/m².    Intake/Output Summary (Last 24 hours) at 8/13/2023 0926  Last data filed at 8/13/2023 0810  Gross per 24 hour   Intake 480 ml   Output 3000 ml   Net -2520 ml         Physical Exam  Constitutional:       Appearance: Normal appearance. He is not ill-appearing.   Cardiovascular:      Rate and Rhythm: Normal rate and regular rhythm.   Pulmonary:      Effort: Pulmonary effort is normal. No respiratory distress.    Musculoskeletal:         General: No swelling.   Skin:     Coloration: Skin is not jaundiced.   Neurological:      Mental Status: He is alert.             Significant Labs: All pertinent labs within the past 24 hours have been reviewed.  BMP:   Recent Labs   Lab 08/13/23  0600   GLU 93      K 3.2*   CL 99   CO2 30*   BUN 34*   CREATININE 2.1*   CALCIUM 8.7   MG 1.0*     CBC:   Recent Labs   Lab 08/12/23  0426 08/13/23  0600   WBC 9.54 8.28   HGB 13.2* 12.6*   HCT 38.8* 38.1*    220       Significant Imaging: I have reviewed all pertinent imaging results/findings within the past 24 hours.      Assessment/Plan:      * ARF (acute renal failure)  Patient with acute kidney injury/acute renal failure likely due to pre-renal azotemia due to dehydration LAM is currently worsening. Baseline creatinine unknown - Labs reviewed- Renal function/electrolytes with Estimated Creatinine Clearance: 39.3 mL/min (A) (based on SCr of 2.1 mg/dL (H)). according to latest data. Monitor urine output and serial BMP and adjust therapy as needed. Avoid nephrotoxins and renally dose meds for GFR listed above.    -Presented with LAM  -Cr on admit 7.4  -Baseline Cr unknown, but Cr 1.1 in 08/2022  -Suspect due to decreased PO intake and home meds (amolidpine-olmarsartan and chlorthalidone)  -Renal US with no obstruction. Findings consistent with CKD  -IVF  -Nephrology consulted: check FENA, pthi, and uric acid  -Avoid nephrotoxins, renal dose all meds.   -Monitor Is/Os  -Monitor     Improving CRT daily     Improving CRT down to 2.1      Sepsis  This patient does not have evidence of infective focus  My overall impression is sepsis.  Source: Unknown  Antibiotics given-   Antibiotics (72h ago, onward)      Start     Stop Route Frequency Ordered    08/08/23 1015  doxycycline tablet 100 mg         -- Oral Every 12 hours 08/08/23 0901    08/08/23 0900  mupirocin 2 % ointment         08/13/23 0859 Nasl 2 times daily 08/08/23 0760       "    Latest lactate reviewed-  No results for input(s): "LACTATE" in the last 72 hours.  Organ dysfunction indicated by Acute kidney injury and New afib    Fluid challenge Actual Body weight- Patient will receive 30ml/kg actual body weight to calculate fluid bolus for treatment of septic shock.     Post- resuscitation assessment Yes Perfusion exam was performed within 6 hours of septic shock presentation after bolus shows Adequate tissue perfusion assessed by non-invasive monitoring       Will ordered but not started, responding to fluids Pressors- Levophed for MAP of 65  Source control achieved by: Abx and fluids    Patient admitted with hypotension and leukocytosis, and feeling unwell for the past couple weeks.  Patient's blood pressure again low today at 70/49, concerns he could bacteremia or another infection.  No skin infections appreciated. Urine clean. No consolidation on CXR.  Initial presentation of AFib (and ARF) often in the setting of infection due to catecholamine release and hypotension, as well.    He is afebrile, however this does not exclude infection.    Blood cultures taken.  , also concerning for an ongoing inflammatory issue/infection.    Given acute renal failure, will hold off on vancomycin for now.    Will initiate IV cefepime and oral doxycycline.   Will await cultures to tailor antibiotics or deescalate.    Unclear source, WBC normalized today 8/11, will transition to oral at d/c tomorrow to compete 7d course     Paroxysmal atrial fibrillation  Patient with Paroxysmal (<7 days) atrial fibrillation which is uncontrolled currently with Amio initially but bad lungs, cannot use long term.. Patient is currently in atrial fibrillation.[id number] Score: The patient doesn't have any registry metric data available. HASBLED Score: ?. Anticoagulation indicated. Anticoagulation done with Apix.   Patient DCCV x2, now on sotalol- will need home script at d/c  In NSR now    Class 1 obesity with body " mass index (BMI) of 31.0 to 31.9 in adult  Body mass index is 31.17 kg/m². Morbid obesity complicates all aspects of disease management from diagnostic modalities to treatment. Weight loss encouraged and health benefits explained to patient.         Leukocytosis  -WBC# 16.07 on admission w/hypotension. Pt afebrile. CXR with no acute process.   -Pt with leukocytosis, suspect reactive. Low suspicion for infection at this time.   -Hypotension improved with fluids  -Continue IVF  -Echo ordered  -Will monitor off abx at this time   -repeat labs in the AM     DC abx    Hypotension  -BP as low as 63/41 in the ED  -Suspect due to pt continuing home meds (amolidpine-olmarsartan and chlorthalidone) in setting of LAM and decreased PO intake/dehydration  -Hypotension improved with fluids  -Continue IVF  -Pt with leukocytosis, suspect reactive. Low suspicion for infection at this time.   -Echo ordered  -Hold home BP meds as above  -Monitor     Dehydration  -Continue IVF as above  -See plan for LAM    Tobacco abuse  - Patient was counseled regarding smoking for 3-10 minutes.  - Encourage smoking cessation daily  - NRT offered     Tobacco dependence         Heroin abuse  Negative on admission, but since IVDU hx higher risk for bacteremia        VTE Risk Mitigation (From admission, onward)           Ordered     apixaban tablet 5 mg  2 times daily         08/08/23 1634     IP VTE LOW RISK PATIENT  Once         08/07/23 0211     Place sequential compression device  Until discontinued         08/07/23 0211                    Discharge Planning   ELODIA:      Code Status: Full Code   Is the patient medically ready for discharge?:     Reason for patient still in hospital (select all that apply): Patient unstable  Discharge Plan A: Home with family                  Hudson Skaggs MD  Department of Hospital Medicine   [facility] [place] - Med Surg

## 2023-08-13 NOTE — ASSESSMENT & PLAN NOTE
Mgmt per IM/neph, creat improving.  Estimated Creatinine Clearance: 39.3 mL/min (A) (based on SCr of 2.1 mg/dL (H)).

## 2023-08-13 NOTE — NURSING
Notified  that patient was running A Fib with RVR. Dr gave order to put Lopressor 5mg IV push once.

## 2023-08-13 NOTE — PROGRESS NOTES
Vivek Whitman is a 62 y.o. male patient.    Follow for LAM    No new c/o, reportedly feeling better  Comfortable    Scheduled Meds:   albuterol-ipratropium  3 mL Nebulization Q6H    allopurinoL  100 mg Oral Daily    apixaban  5 mg Oral BID    aspirin  81 mg Oral Daily    atorvastatin  40 mg Oral QHS    doxycycline  100 mg Oral Q12H    famotidine  20 mg Oral Daily    nicotine  1 patch Transdermal Daily       Review of patient's allergies indicates:  No Known Allergies      Vital Signs Range (Last 24H):  Temp:  [98 °F (36.7 °C)-99.3 °F (37.4 °C)]   Pulse:  []   Resp:  [16-20]   BP: (117-148)/(64-86)   SpO2:  [90 %-96 %]     I & O (Last 24H):  Intake/Output Summary (Last 24 hours) at 8/13/2023 1016  Last data filed at 8/13/2023 0810  Gross per 24 hour   Intake 480 ml   Output 3000 ml   Net -2520 ml           Physical Exam:  General appearance: well developed, well nourished, no distress  Lungs:  clear to auscultation bilaterally and normal respiratory effort  Heart: regular rhythm and rate  Abdomen:  soft, normal bowel sounds  Extremities: no cyanosis or edema, or clubbing    Laboratory:  I have reviewed all pertinent lab results within the past 24 hours.  CBC:   Recent Labs   Lab 08/13/23  0600   WBC 8.28   RBC 4.19*   HGB 12.6*   HCT 38.1*      MCV 91   MCH 30.1   MCHC 33.1     CMP:   Recent Labs   Lab 08/13/23  0600   GLU 93   CALCIUM 8.7   ALBUMIN 2.1*   PROT 5.8*      K 3.2*   CO2 30*   CL 99   BUN 34*   CREATININE 2.1*   ALKPHOS 70   ALT 65*   AST 78*   BILITOT 0.8       Assessment & Plan    LAM - improving  PAF  HTN    Continue present Rx  Watch renal function      Trac ENOCH Walls  8/13/2023

## 2023-08-13 NOTE — ASSESSMENT & PLAN NOTE
Patient with acute kidney injury/acute renal failure likely due to pre-renal azotemia due to dehydration LAM is currently worsening. Baseline creatinine unknown - Labs reviewed- Renal function/electrolytes with Estimated Creatinine Clearance: 39.3 mL/min (A) (based on SCr of 2.1 mg/dL (H)). according to latest data. Monitor urine output and serial BMP and adjust therapy as needed. Avoid nephrotoxins and renally dose meds for GFR listed above.    -Presented with LAM  -Cr on admit 7.4  -Baseline Cr unknown, but Cr 1.1 in 08/2022  -Suspect due to decreased PO intake and home meds (amolidpine-olmarsartan and chlorthalidone)  -Renal US with no obstruction. Findings consistent with CKD  -IVF  -Nephrology consulted: check FENA, pthi, and uric acid  -Avoid nephrotoxins, renal dose all meds.   -Monitor Is/Os  -Monitor     Improving CRT daily     Improving CRT down to 2.1

## 2023-08-13 NOTE — NURSING
[facility]  Nurses Note -- 4 Eyes      8/13/2023       Skin assessed on: Q Shift      [x] No Pressure Injuries Present    [x]Prevention Measures Documented    [] Yes LDA  for Pressure Injury Previously documented     [] Yes New Pressure Injury Discovered   [] LDA for New Pressure Injury Added      Attending RN:  Estrella Moser RN     Second RN:  GAURANG Lepe

## 2023-08-13 NOTE — NURSING
Lopressor 5mg Iv push given. Patient blood pressure remained stable. Handoff report given to GAURANG Howell. Informed her that repeat BP and HR at 07:45 and monitor for rhythm change.

## 2023-08-13 NOTE — SUBJECTIVE & OBJECTIVE
Review of Systems   Gastrointestinal:  Negative for melena.   Genitourinary:  Negative for hematuria.     Objective:     Vital Signs (Most Recent):  Temp: 98 °F (36.7 °C) (08/13/23 0659)  Pulse: (!) 128 (08/13/23 1030)  Resp: 19 (08/13/23 0734)  BP: 117/66 (08/13/23 0714)  SpO2: (!) 90 % (08/13/23 0734) Vital Signs (24h Range):  Temp:  [98 °F (36.7 °C)-99.3 °F (37.4 °C)] 98 °F (36.7 °C)  Pulse:  [] 128  Resp:  [16-20] 19  SpO2:  [90 %-96 %] 90 %  BP: (117-148)/(64-86) 117/66     Weight: 93 kg (205 lb)  Body mass index is 31.17 kg/m².     SpO2: (!) 90 %         Intake/Output Summary (Last 24 hours) at 8/13/2023 1036  Last data filed at 8/13/2023 1018  Gross per 24 hour   Intake 480 ml   Output 3600 ml   Net -3120 ml         Lines/Drains/Airways       Peripheral Intravenous Line  Duration                  Peripheral IV - Single Lumen 08/09/23 1900 20 G Left;Posterior;Proximal Forearm 3 days         Peripheral IV - Single Lumen 08/09/23 1900 22 G Distal;Posterior;Right Forearm 3 days                       Physical Exam  Constitutional:       General: He is not in acute distress.     Appearance: He is well-developed. He is obese. He is not ill-appearing, toxic-appearing or diaphoretic.   HENT:      Head: Normocephalic and atraumatic.   Eyes:      General: No scleral icterus.     Extraocular Movements: Extraocular movements intact.      Conjunctiva/sclera: Conjunctivae normal.      Pupils: Pupils are equal, round, and reactive to light.   Neck:      Thyroid: No thyromegaly.      Vascular: No JVD.      Trachea: No tracheal deviation.   Cardiovascular:      Rate and Rhythm: Tachycardia present. Rhythm irregularly irregular.      Heart sounds: S1 normal and S2 normal. Murmur heard.      Systolic murmur is present with a grade of 2/6.      No friction rub. No gallop.   Pulmonary:      Effort: Pulmonary effort is normal. No respiratory distress.      Breath sounds: Normal breath sounds. No stridor. No wheezing,  rhonchi or rales.   Chest:      Chest wall: No tenderness.   Abdominal:      General: There is no distension.      Palpations: Abdomen is soft.   Musculoskeletal:         General: No swelling or tenderness. Normal range of motion.      Cervical back: Normal range of motion and neck supple. No rigidity.      Right lower leg: No edema.      Left lower leg: No edema.   Skin:     General: Skin is warm and dry.      Coloration: Skin is not jaundiced.      Findings: No rash.   Neurological:      General: No focal deficit present.      Mental Status: He is alert and oriented to person, place, and time.      Cranial Nerves: No cranial nerve deficit.   Psychiatric:         Mood and Affect: Mood normal.         Behavior: Behavior normal.            Current Medications:   albuterol-ipratropium  3 mL Nebulization Q6H    allopurinoL  100 mg Oral Daily    apixaban  5 mg Oral BID    aspirin  81 mg Oral Daily    atorvastatin  40 mg Oral QHS    doxycycline  100 mg Oral Q12H    famotidine  20 mg Oral Daily    nicotine  1 patch Transdermal Daily      lactated ringers 100 mL/hr at 08/13/23 1018     aluminum-magnesium hydroxide-simethicone, dextrose 50%, dextrose 50%, glucagon (human recombinant), glucose, glucose, naloxone, ondansetron, sodium chloride 0.9%    Laboratory (all labs reviewed):  CBC:  Recent Labs   Lab 08/08/23  0522 08/09/23  0601 08/11/23  0326 08/12/23  0426 08/13/23  0600   WBC 13.16 H  13.16 H 13.06 H 11.40 9.54 8.28   Hemoglobin 10.8 L  10.8 L 12.3 L 12.0 L 13.2 L 12.6 L   Hematocrit 31.6 L  31.6 L 35.8 L 36.0 L 38.8 L 38.1 L   Platelets 169  169 180 195 201 220         CHEMISTRIES:  Recent Labs   Lab 03/14/21  0438 03/15/21  0410 03/17/21  1110 08/10/23  0603 08/10/23  1602 08/11/23  0326 08/12/23  0426 08/13/23  0600   Glucose 99 81   < > 114 H 121 H 109 92 93   Sodium 138 137   < > 134 L 134 L 136 137 138   Potassium 3.7 4.1   < > 2.9 L 3.5 3.5 3.1 L 3.2 L   BUN 13 18   < > 63 H 56 H 50 H 47 H 34 H    Creatinine 0.8 0.8   < > 4.6 H 3.9 H 3.5 H 2.7 H 2.1 H   eGFR if non  >60 >60  --   --   --   --   --   --    eGFR  --   --    < > 13 A 16 A 19 A 26 A 35 A   Calcium 8.6 L 8.7   < > 8.5 L 8.5 L 8.5 L 8.8 8.7   Magnesium  --   --    < > 1.3 L 1.6 1.9 1.3 L 1.0 L    < > = values in this interval not displayed.         CARDIAC BIOMARKERS:  Recent Labs   Lab 03/13/21  1816 08/07/23  1202   CPK  --  58   Troponin I 0.016  --          COAGS:  Recent Labs   Lab 12/24/20  1236 03/13/21  1506 08/07/23  1936   INR 1.0 1.0 1.3 H         LIPIDS/LFTS:  Recent Labs   Lab 03/15/21  0410 03/17/21  1110 08/29/22  1224 08/07/23  1202 08/11/23  0326 08/12/23  0426 08/13/23  0600   Cholesterol 130  --   --   --   --   --   --    Triglycerides 98  --   --   --   --   --   --    HDL 35 L  --   --   --   --   --   --    LDL Cholesterol 75.4  --   --   --   --   --   --    Non-HDL Cholesterol 95  --   --   --   --   --   --    AST  --    < > 126 H 66 H  66 H 47 H 68 H 78 H   ALT  --    < > 202 H 73 H  73 H 48 H 54 H 65 H    < > = values in this interval not displayed.         BNP:        TSH:  Recent Labs   Lab 03/14/21  0438   TSH 3.152         Free T4:        Estimated Creatinine Clearance: 39.3 mL/min (A) (based on SCr of 2.1 mg/dL (H)).      Diagnostic Results:  ECG (personally reviewed and interpreted tracing(s)):  8/10/23 1037 , NSSTTW changes  8/11/23 0837 SR 56, QTc 528 (baseline 478)    L MPI 8/11/23 (images personally reviewed and interpreted)    Normal myocardial perfusion scan. There is no evidence of myocardial ischemia or infarction.    The gated perfusion images showed an ejection fraction of 64% post stress.    There is normal wall motion post stress.    KASHIF/DCCV 8/8/23    Left Ventricle: Normal wall motion. There is normal systolic function with a visually estimated ejection fraction of 55 - 60%.    Left Atrium: Left atrium is moderately dilated. There is no thrombus in the left atrial  appendage.    Right Ventricle: Right ventricle was not well visualized due to poor acoustic window.    Aortic Valve: There is mild aortic valve sclerosis.    Mitral Valve: There is mild regurgitation.    Tricuspid Valve: There is mild regurgitation.    The pre-cardioversion rhythm was atrial fibrillation. A 200 J synchronized cardioversion was successfully performed with restoration of normal sinus rhythm. The post-cardioversion rhythm was normal sinus rhythm.    Echo: 8/7/23    Left Ventricle: The left ventricle is normal in size. Mildly increased wall thickness. Normal wall motion. There is normal systolic function with a visually estimated ejection fraction of 65 - 70%. Grade I diastolic dysfunction.    Left Atrium: Left atrium is severely dilated.    Right Ventricle: Normal right ventricular cavity size. Systolic function is normal.    Right Atrium: Right atrium is moderately dilated.    Aortic Valve: There is aortic valve sclerosis.    Tricuspid Valve: There is mild regurgitation.    Pulmonary Artery: The estimated pulmonary artery systolic pressure is 23 mmHg.    IVC/SVC: Normal venous pressure at 3 mmHg.

## 2023-08-13 NOTE — NURSING
HR sustaining in 130s at rest post IV Metoprolol. RR nurse and Cardio notified, ordered transfer to ICU for close monitoring.

## 2023-08-13 NOTE — ASSESSMENT & PLAN NOTE
Now back in AF/RVR after KASHIF/DCCV and repeat DCCV.  Given LAM and prolonged QTc, will stop sotalol.  Cont DOAC  Priscilla MPI neg.  Will initiate flecainide in ICU with dilt gtt.

## 2023-08-13 NOTE — PROGRESS NOTES
[facility]  Cardiology  Progress Note    Patient Name: Vivek Whitman  MRN: [id number]  Admission Date: 8/6/2023  Hospital Length of Stay: 6 days  Code Status: Full Code   Attending Physician: Hudson Ayon MD   Primary Care Physician: No, Primary Doctor  Expected Discharge Date:   Principal Problem:ARF (acute renal failure)    Subjective:     Interval Hx: back in AF/RVR.  No cp/sob.  Case d/w Dr. Newsome ([facility]).    Tele: AF/RVR (personally reviewed and interpreted)            Review of Systems   Gastrointestinal:  Negative for melena.   Genitourinary:  Negative for hematuria.     Objective:     Vital Signs (Most Recent):  Temp: 98 °F (36.7 °C) (08/13/23 0659)  Pulse: (!) 128 (08/13/23 1030)  Resp: 19 (08/13/23 0734)  BP: 117/66 (08/13/23 0714)  SpO2: (!) 90 % (08/13/23 0734) Vital Signs (24h Range):  Temp:  [98 °F (36.7 °C)-99.3 °F (37.4 °C)] 98 °F (36.7 °C)  Pulse:  [] 128  Resp:  [16-20] 19  SpO2:  [90 %-96 %] 90 %  BP: (117-148)/(64-86) 117/66     Weight: 93 kg (205 lb)  Body mass index is 31.17 kg/m².     SpO2: (!) 90 %         Intake/Output Summary (Last 24 hours) at 8/13/2023 1036  Last data filed at 8/13/2023 1018  Gross per 24 hour   Intake 480 ml   Output 3600 ml   Net -3120 ml         Lines/Drains/Airways       Peripheral Intravenous Line  Duration                  Peripheral IV - Single Lumen 08/09/23 1900 20 G Left;Posterior;Proximal Forearm 3 days         Peripheral IV - Single Lumen 08/09/23 1900 22 G Distal;Posterior;Right Forearm 3 days                       Physical Exam  Constitutional:       General: He is not in acute distress.     Appearance: He is well-developed. He is obese. He is not ill-appearing, toxic-appearing or diaphoretic.   HENT:      Head: Normocephalic and atraumatic.   Eyes:      General: No scleral icterus.     Extraocular Movements: Extraocular movements intact.      Conjunctiva/sclera: Conjunctivae normal.      Pupils: Pupils are equal, round, and reactive  to light.   Neck:      Thyroid: No thyromegaly.      Vascular: No JVD.      Trachea: No tracheal deviation.   Cardiovascular:      Rate and Rhythm: Tachycardia present. Rhythm irregularly irregular.      Heart sounds: S1 normal and S2 normal. Murmur heard.      Systolic murmur is present with a grade of 2/6.      No friction rub. No gallop.   Pulmonary:      Effort: Pulmonary effort is normal. No respiratory distress.      Breath sounds: Normal breath sounds. No stridor. No wheezing, rhonchi or rales.   Chest:      Chest wall: No tenderness.   Abdominal:      General: There is no distension.      Palpations: Abdomen is soft.   Musculoskeletal:         General: No swelling or tenderness. Normal range of motion.      Cervical back: Normal range of motion and neck supple. No rigidity.      Right lower leg: No edema.      Left lower leg: No edema.   Skin:     General: Skin is warm and dry.      Coloration: Skin is not jaundiced.      Findings: No rash.   Neurological:      General: No focal deficit present.      Mental Status: He is alert and oriented to person, place, and time.      Cranial Nerves: No cranial nerve deficit.   Psychiatric:         Mood and Affect: Mood normal.         Behavior: Behavior normal.            Current Medications:   albuterol-ipratropium  3 mL Nebulization Q6H    allopurinoL  100 mg Oral Daily    apixaban  5 mg Oral BID    aspirin  81 mg Oral Daily    atorvastatin  40 mg Oral QHS    doxycycline  100 mg Oral Q12H    famotidine  20 mg Oral Daily    nicotine  1 patch Transdermal Daily      lactated ringers 100 mL/hr at 08/13/23 1018     aluminum-magnesium hydroxide-simethicone, dextrose 50%, dextrose 50%, glucagon (human recombinant), glucose, glucose, naloxone, ondansetron, sodium chloride 0.9%    Laboratory (all labs reviewed):  CBC:  Recent Labs   Lab 08/08/23  0522 08/09/23  0601 08/11/23  0326 08/12/23  0426 08/13/23  0600   WBC 13.16 H  13.16 H 13.06 H 11.40 9.54 8.28   Hemoglobin 10.8 L   10.8 L 12.3 L 12.0 L 13.2 L 12.6 L   Hematocrit 31.6 L  31.6 L 35.8 L 36.0 L 38.8 L 38.1 L   Platelets 169  169 180 195 201 220         CHEMISTRIES:  Recent Labs   Lab 03/14/21  0438 03/15/21  0410 03/17/21  1110 08/10/23  0603 08/10/23  1602 08/11/23  0326 08/12/23  0426 08/13/23  0600   Glucose 99 81   < > 114 H 121 H 109 92 93   Sodium 138 137   < > 134 L 134 L 136 137 138   Potassium 3.7 4.1   < > 2.9 L 3.5 3.5 3.1 L 3.2 L   BUN 13 18   < > 63 H 56 H 50 H 47 H 34 H   Creatinine 0.8 0.8   < > 4.6 H 3.9 H 3.5 H 2.7 H 2.1 H   eGFR if non  >60 >60  --   --   --   --   --   --    eGFR  --   --    < > 13 A 16 A 19 A 26 A 35 A   Calcium 8.6 L 8.7   < > 8.5 L 8.5 L 8.5 L 8.8 8.7   Magnesium  --   --    < > 1.3 L 1.6 1.9 1.3 L 1.0 L    < > = values in this interval not displayed.         CARDIAC BIOMARKERS:  Recent Labs   Lab 03/13/21  1816 08/07/23  1202   CPK  --  58   Troponin I 0.016  --          COAGS:  Recent Labs   Lab 12/24/20  1236 03/13/21  1506 08/07/23  1936   INR 1.0 1.0 1.3 H         LIPIDS/LFTS:  Recent Labs   Lab 03/15/21  0410 03/17/21  1110 08/29/22  1224 08/07/23  1202 08/11/23  0326 08/12/23  0426 08/13/23  0600   Cholesterol 130  --   --   --   --   --   --    Triglycerides 98  --   --   --   --   --   --    HDL 35 L  --   --   --   --   --   --    LDL Cholesterol 75.4  --   --   --   --   --   --    Non-HDL Cholesterol 95  --   --   --   --   --   --    AST  --    < > 126 H 66 H  66 H 47 H 68 H 78 H   ALT  --    < > 202 H 73 H  73 H 48 H 54 H 65 H    < > = values in this interval not displayed.         BNP:        TSH:  Recent Labs   Lab 03/14/21  0438   TSH 3.152         Free T4:        Estimated Creatinine Clearance: 39.3 mL/min (A) (based on SCr of 2.1 mg/dL (H)).      Diagnostic Results:  ECG (personally reviewed and interpreted tracing(s)):  8/10/23 1037 , NSSTTW changes  8/11/23 0837 SR 56, QTc 528 (baseline 478)    L MPI 8/11/23 (images personally reviewed and  interpreted)    Normal myocardial perfusion scan. There is no evidence of myocardial ischemia or infarction.    The gated perfusion images showed an ejection fraction of 64% post stress.    There is normal wall motion post stress.    KASHIF/DCCV 8/8/23    Left Ventricle: Normal wall motion. There is normal systolic function with a visually estimated ejection fraction of 55 - 60%.    Left Atrium: Left atrium is moderately dilated. There is no thrombus in the left atrial appendage.    Right Ventricle: Right ventricle was not well visualized due to poor acoustic window.    Aortic Valve: There is mild aortic valve sclerosis.    Mitral Valve: There is mild regurgitation.    Tricuspid Valve: There is mild regurgitation.    The pre-cardioversion rhythm was atrial fibrillation. A 200 J synchronized cardioversion was successfully performed with restoration of normal sinus rhythm. The post-cardioversion rhythm was normal sinus rhythm.    Echo: 8/7/23    Left Ventricle: The left ventricle is normal in size. Mildly increased wall thickness. Normal wall motion. There is normal systolic function with a visually estimated ejection fraction of 65 - 70%. Grade I diastolic dysfunction.    Left Atrium: Left atrium is severely dilated.    Right Ventricle: Normal right ventricular cavity size. Systolic function is normal.    Right Atrium: Right atrium is moderately dilated.    Aortic Valve: There is aortic valve sclerosis.    Tricuspid Valve: There is mild regurgitation.    Pulmonary Artery: The estimated pulmonary artery systolic pressure is 23 mmHg.    IVC/SVC: Normal venous pressure at 3 mmHg.        Assessment and Plan:     * ARF (acute renal failure)  Mgmt per IM/neph, creat improving.  Estimated Creatinine Clearance: 39.3 mL/min (A) (based on SCr of 2.1 mg/dL (H)).      Paroxysmal atrial fibrillation  Now back in AF/RVR after KASHIF/DCCV and repeat DCCV.  Given LAM and prolonged QTc, will stop sotalol.  Cont DOAC  Priscilla MPI neg.  Will  initiate flecainide in ICU with dilt gtt.  Keep NPO after MN for possible DCCV in am.    Heroin abuse  Abstinence recommended    Tobacco abuse  Cessation recommended      Sepsis  Mgmt per IM    Hypotension  Likely secondry to dehydration. Continue to monitor response to iv hydration. Hold anti hypertensives. Avoid nephrotoxic agents    Dehydration  Iv hydration        VTE Risk Mitigation (From admission, onward)           Ordered     apixaban tablet 5 mg  2 times daily         08/08/23 1634     IP VTE LOW RISK PATIENT  Once         08/07/23 0211     Place sequential compression device  Until discontinued         08/07/23 0211                  Case d/w RN and Dr. Ayon, pt being transferred back to ICU for mgmt of AF/RVR.  Critical care time 35min.    Win Caceres MD  Cardiology  [facility]

## 2023-08-13 NOTE — SUBJECTIVE & OBJECTIVE
Interval History: No new issues     Review of Systems   Constitutional:  Negative for activity change.   HENT:  Negative for congestion.    Respiratory:  Negative for apnea.    Gastrointestinal:  Negative for abdominal distention.   Genitourinary:  Negative for difficulty urinating.   Neurological:  Negative for dizziness.     Objective:     Vital Signs (Most Recent):  Temp: 98 °F (36.7 °C) (08/13/23 0659)  Pulse: 70 (08/13/23 0734)  Resp: 19 (08/13/23 0734)  BP: 117/66 (08/13/23 0714)  SpO2: (!) 90 % (08/13/23 0734) Vital Signs (24h Range):  Temp:  [98 °F (36.7 °C)-99.3 °F (37.4 °C)] 98 °F (36.7 °C)  Pulse:  [] 70  Resp:  [16-20] 19  SpO2:  [90 %-96 %] 90 %  BP: (117-148)/(64-86) 117/66     Weight: 93 kg (205 lb)  Body mass index is 31.17 kg/m².    Intake/Output Summary (Last 24 hours) at 8/13/2023 0945  Last data filed at 8/13/2023 0810  Gross per 24 hour   Intake 480 ml   Output 3000 ml   Net -2520 ml         Physical Exam  Constitutional:       Appearance: Normal appearance. He is not ill-appearing.   Cardiovascular:      Rate and Rhythm: Normal rate and regular rhythm.   Pulmonary:      Effort: Pulmonary effort is normal. No respiratory distress.   Musculoskeletal:         General: No swelling.   Skin:     Coloration: Skin is not jaundiced.   Neurological:      Mental Status: He is alert.             Significant Labs: All pertinent labs within the past 24 hours have been reviewed.  BMP:   Recent Labs   Lab 08/13/23  0600   GLU 93      K 3.2*   CL 99   CO2 30*   BUN 34*   CREATININE 2.1*   CALCIUM 8.7   MG 1.0*     CBC:   Recent Labs   Lab 08/12/23  0426 08/13/23  0600   WBC 9.54 8.28   HGB 13.2* 12.6*   HCT 38.8* 38.1*    220       Significant Imaging: I have reviewed all pertinent imaging results/findings within the past 24 hours.

## 2023-08-13 NOTE — NURSING
0850: Pt already ate breakfast; instructed NPO starting now per Cardiology.    0910: Diet placed back in

## 2023-08-13 NOTE — NURSING
[facility]  Rapid Response Nurse Intervention/ Task    Date of Visit: 08/13/2023  Time of Visit: 0910       INTERVENTIONS/ TASK Completed:     MEWS monitoring with MEWS score 3 with . Cardiac monitor shows afib with HR up 130. I went to bedside at 0910. Pt denied CP or SOB. I spoke with monitor tech and she states that he has also had some beats of V-tach.    0925 I notified Dr. Caceres of afib rvr and beats of V-tach.    1030 I spoke with Dr. Caceres again pt has remained with 's  as high as 160. He has multiple runs of V-tach 4-6 beats. New order to transfer to ICU. Rupa MERLOS charge updated.

## 2023-08-14 LAB
ALBUMIN SERPL BCP-MCNC: 2.2 G/DL (ref 3.5–5.2)
ALP SERPL-CCNC: 69 U/L (ref 55–135)
ALT SERPL W/O P-5'-P-CCNC: 64 U/L (ref 10–44)
ANION GAP SERPL CALC-SCNC: 9 MMOL/L (ref 8–16)
AST SERPL-CCNC: 76 U/L (ref 10–40)
BASOPHILS # BLD AUTO: 0.05 K/UL (ref 0–0.2)
BASOPHILS NFR BLD: 0.6 % (ref 0–1.9)
BILIRUB SERPL-MCNC: 0.9 MG/DL (ref 0.1–1)
BUN SERPL-MCNC: 30 MG/DL (ref 8–23)
CALCIUM SERPL-MCNC: 8.8 MG/DL (ref 8.7–10.5)
CHLORIDE SERPL-SCNC: 99 MMOL/L (ref 95–110)
CO2 SERPL-SCNC: 29 MMOL/L (ref 23–29)
CREAT SERPL-MCNC: 1.8 MG/DL (ref 0.5–1.4)
DIFFERENTIAL METHOD: ABNORMAL
EOSINOPHIL # BLD AUTO: 0.2 K/UL (ref 0–0.5)
EOSINOPHIL NFR BLD: 2.3 % (ref 0–8)
ERYTHROCYTE [DISTWIDTH] IN BLOOD BY AUTOMATED COUNT: 14 % (ref 11.5–14.5)
EST. GFR  (NO RACE VARIABLE): 42 ML/MIN/1.73 M^2
GLUCOSE SERPL-MCNC: 86 MG/DL (ref 70–110)
HCT VFR BLD AUTO: 37.8 % (ref 40–54)
HGB BLD-MCNC: 12.7 G/DL (ref 14–18)
IMM GRANULOCYTES # BLD AUTO: 0.04 K/UL (ref 0–0.04)
IMM GRANULOCYTES NFR BLD AUTO: 0.4 % (ref 0–0.5)
LYMPHOCYTES # BLD AUTO: 1.9 K/UL (ref 1–4.8)
LYMPHOCYTES NFR BLD: 20.9 % (ref 18–48)
MAGNESIUM SERPL-MCNC: 1 MG/DL (ref 1.6–2.6)
MCH RBC QN AUTO: 30.1 PG (ref 27–31)
MCHC RBC AUTO-ENTMCNC: 33.6 G/DL (ref 32–36)
MCV RBC AUTO: 90 FL (ref 82–98)
MONOCYTES # BLD AUTO: 1 K/UL (ref 0.3–1)
MONOCYTES NFR BLD: 11.5 % (ref 4–15)
NEUTROPHILS # BLD AUTO: 5.8 K/UL (ref 1.8–7.7)
NEUTROPHILS NFR BLD: 64.3 % (ref 38–73)
NRBC BLD-RTO: 0 /100 WBC
PHOSPHATE SERPL-MCNC: 3.1 MG/DL (ref 2.7–4.5)
PLATELET # BLD AUTO: 199 K/UL (ref 150–450)
PMV BLD AUTO: 9.4 FL (ref 9.2–12.9)
POTASSIUM SERPL-SCNC: 3.1 MMOL/L (ref 3.5–5.1)
PROT SERPL-MCNC: 6.2 G/DL (ref 6–8.4)
RBC # BLD AUTO: 4.22 M/UL (ref 4.6–6.2)
SODIUM SERPL-SCNC: 137 MMOL/L (ref 136–145)
WBC # BLD AUTO: 8.98 K/UL (ref 3.9–12.7)

## 2023-08-14 PROCEDURE — 93010 ELECTROCARDIOGRAM REPORT: CPT | Mod: ,,, | Performed by: INTERNAL MEDICINE

## 2023-08-14 PROCEDURE — 80053 COMPREHEN METABOLIC PANEL: CPT | Performed by: STUDENT IN AN ORGANIZED HEALTH CARE EDUCATION/TRAINING PROGRAM

## 2023-08-14 PROCEDURE — 25000003 PHARM REV CODE 250: Performed by: STUDENT IN AN ORGANIZED HEALTH CARE EDUCATION/TRAINING PROGRAM

## 2023-08-14 PROCEDURE — 99233 PR SUBSEQUENT HOSPITAL CARE,LEVL III: ICD-10-PCS | Mod: ,,, | Performed by: INTERNAL MEDICINE

## 2023-08-14 PROCEDURE — 94761 N-INVAS EAR/PLS OXIMETRY MLT: CPT

## 2023-08-14 PROCEDURE — 25000242 PHARM REV CODE 250 ALT 637 W/ HCPCS: Performed by: STUDENT IN AN ORGANIZED HEALTH CARE EDUCATION/TRAINING PROGRAM

## 2023-08-14 PROCEDURE — 21400001 HC TELEMETRY ROOM

## 2023-08-14 PROCEDURE — 94640 AIRWAY INHALATION TREATMENT: CPT

## 2023-08-14 PROCEDURE — 85025 COMPLETE CBC W/AUTO DIFF WBC: CPT | Performed by: STUDENT IN AN ORGANIZED HEALTH CARE EDUCATION/TRAINING PROGRAM

## 2023-08-14 PROCEDURE — 25000003 PHARM REV CODE 250: Performed by: INTERNAL MEDICINE

## 2023-08-14 PROCEDURE — 99233 SBSQ HOSP IP/OBS HIGH 50: CPT | Mod: ,,, | Performed by: INTERNAL MEDICINE

## 2023-08-14 PROCEDURE — 27000221 HC OXYGEN, UP TO 24 HOURS

## 2023-08-14 PROCEDURE — S4991 NICOTINE PATCH NONLEGEND: HCPCS | Performed by: INTERNAL MEDICINE

## 2023-08-14 PROCEDURE — 25000242 PHARM REV CODE 250 ALT 637 W/ HCPCS: Performed by: INTERNAL MEDICINE

## 2023-08-14 PROCEDURE — 63600175 PHARM REV CODE 636 W HCPCS: Performed by: INTERNAL MEDICINE

## 2023-08-14 PROCEDURE — 93010 EKG 12-LEAD: ICD-10-PCS | Mod: ,,, | Performed by: INTERNAL MEDICINE

## 2023-08-14 PROCEDURE — 83735 ASSAY OF MAGNESIUM: CPT | Performed by: STUDENT IN AN ORGANIZED HEALTH CARE EDUCATION/TRAINING PROGRAM

## 2023-08-14 PROCEDURE — 93005 ELECTROCARDIOGRAM TRACING: CPT

## 2023-08-14 PROCEDURE — 84100 ASSAY OF PHOSPHORUS: CPT | Performed by: STUDENT IN AN ORGANIZED HEALTH CARE EDUCATION/TRAINING PROGRAM

## 2023-08-14 PROCEDURE — 63600175 PHARM REV CODE 636 W HCPCS: Performed by: STUDENT IN AN ORGANIZED HEALTH CARE EDUCATION/TRAINING PROGRAM

## 2023-08-14 RX ORDER — POTASSIUM CHLORIDE 20 MEQ/1
60 TABLET, EXTENDED RELEASE ORAL ONCE
Status: COMPLETED | OUTPATIENT
Start: 2023-08-14 | End: 2023-08-14

## 2023-08-14 RX ORDER — MAGNESIUM SULFATE HEPTAHYDRATE 40 MG/ML
4 INJECTION, SOLUTION INTRAVENOUS ONCE
Status: COMPLETED | OUTPATIENT
Start: 2023-08-14 | End: 2023-08-14

## 2023-08-14 RX ADMIN — MAGNESIUM SULFATE HEPTAHYDRATE 2 G: 40 INJECTION, SOLUTION INTRAVENOUS at 10:08

## 2023-08-14 RX ADMIN — DOXYCYCLINE HYCLATE 100 MG: 100 TABLET, COATED ORAL at 09:08

## 2023-08-14 RX ADMIN — FAMOTIDINE 20 MG: 20 TABLET, FILM COATED ORAL at 09:08

## 2023-08-14 RX ADMIN — NICOTINE 1 PATCH: 7 PATCH, EXTENDED RELEASE TRANSDERMAL at 09:08

## 2023-08-14 RX ADMIN — DOXYCYCLINE HYCLATE 100 MG: 100 TABLET, COATED ORAL at 08:08

## 2023-08-14 RX ADMIN — IPRATROPIUM BROMIDE AND ALBUTEROL SULFATE 3 ML: 2.5; .5 SOLUTION RESPIRATORY (INHALATION) at 06:08

## 2023-08-14 RX ADMIN — ASPIRIN 81 MG: 81 TABLET, COATED ORAL at 09:08

## 2023-08-14 RX ADMIN — POTASSIUM CHLORIDE 60 MEQ: 1500 TABLET, EXTENDED RELEASE ORAL at 08:08

## 2023-08-14 RX ADMIN — IPRATROPIUM BROMIDE AND ALBUTEROL SULFATE 3 ML: 2.5; .5 SOLUTION RESPIRATORY (INHALATION) at 02:08

## 2023-08-14 RX ADMIN — SODIUM CHLORIDE, POTASSIUM CHLORIDE, SODIUM LACTATE AND CALCIUM CHLORIDE: 600; 310; 30; 20 INJECTION, SOLUTION INTRAVENOUS at 08:08

## 2023-08-14 RX ADMIN — APIXABAN 5 MG: 5 TABLET, FILM COATED ORAL at 09:08

## 2023-08-14 RX ADMIN — IPRATROPIUM BROMIDE AND ALBUTEROL SULFATE 3 ML: 2.5; .5 SOLUTION RESPIRATORY (INHALATION) at 07:08

## 2023-08-14 RX ADMIN — METOPROLOL SUCCINATE 25 MG: 25 TABLET, EXTENDED RELEASE ORAL at 09:08

## 2023-08-14 RX ADMIN — FLECAINIDE ACETATE 100 MG: 50 TABLET ORAL at 08:08

## 2023-08-14 RX ADMIN — ATORVASTATIN CALCIUM 40 MG: 40 TABLET, FILM COATED ORAL at 08:08

## 2023-08-14 RX ADMIN — MAGNESIUM SULFATE HEPTAHYDRATE 2 G: 40 INJECTION, SOLUTION INTRAVENOUS at 08:08

## 2023-08-14 RX ADMIN — ALLOPURINOL 100 MG: 100 TABLET ORAL at 09:08

## 2023-08-14 RX ADMIN — IPRATROPIUM BROMIDE AND ALBUTEROL SULFATE 3 ML: 2.5; .5 SOLUTION RESPIRATORY (INHALATION) at 01:08

## 2023-08-14 RX ADMIN — FLECAINIDE ACETATE 100 MG: 50 TABLET ORAL at 09:08

## 2023-08-14 RX ADMIN — APIXABAN 5 MG: 5 TABLET, FILM COATED ORAL at 08:08

## 2023-08-14 RX ADMIN — SODIUM CHLORIDE, POTASSIUM CHLORIDE, SODIUM LACTATE AND CALCIUM CHLORIDE: 600; 310; 30; 20 INJECTION, SOLUTION INTRAVENOUS at 11:08

## 2023-08-14 NOTE — ASSESSMENT & PLAN NOTE
"This patient does not have evidence of infective focus  My overall impression is sepsis.  Source: Unknown  Antibiotics given-   Antibiotics (72h ago, onward)    Start     Stop Route Frequency Ordered    08/08/23 1015  doxycycline tablet 100 mg         -- Oral Every 12 hours 08/08/23 0901    08/08/23 0900  mupirocin 2 % ointment         08/13/23 0859 Nasl 2 times daily 08/08/23 0725        Latest lactate reviewed-  No results for input(s): "LACTATE" in the last 72 hours.  Organ dysfunction indicated by Acute kidney injury and New afib    Fluid challenge Actual Body weight- Patient will receive 30ml/kg actual body weight to calculate fluid bolus for treatment of septic shock.     Post- resuscitation assessment Yes Perfusion exam was performed within 6 hours of septic shock presentation after bolus shows Adequate tissue perfusion assessed by non-invasive monitoring       Will ordered but not started, responding to fluids Pressors- Levophed for MAP of 65  Source control achieved by: Abx and fluids    Patient admitted with hypotension and leukocytosis, and feeling unwell for the past couple weeks.  Patient's blood pressure again low today at 70/49, concerns he could bacteremia or another infection.  No skin infections appreciated. Urine clean. No consolidation on CXR.  Initial presentation of AFib (and ARF)   He is afebrile, however this does not exclude infection.    Blood cultures taken.  , also concerning for an ongoing inflammatory issue/infection.    Given acute renal failure, will hold off on vancomycin for now.    Will initiate IV cefepime and oral doxycycline.   Will await cultures to tailor antibiotics or deescalate.    Unclear source, WBC normalized today 8/11, will transition to oral at d/c tomorrow to compete 7d course   "

## 2023-08-14 NOTE — ASSESSMENT & PLAN NOTE
Now back in SR.  Cont DOAC  Priscilla MPI neg.    Cont flecainide 100mg bid and metoprolol ER.  Check EKG

## 2023-08-14 NOTE — ASSESSMENT & PLAN NOTE
Patient with acute kidney injury/acute renal failure likely due to pre-renal azotemia due to dehydration LAM is currently worsening. Baseline creatinine unknown - Labs reviewed- Renal function/electrolytes with Estimated Creatinine Clearance: 45.9 mL/min (A) (based on SCr of 1.8 mg/dL (H)). according to latest data. Monitor urine output and serial BMP and adjust therapy as needed. Avoid nephrotoxins and renally dose meds for GFR listed above.    -Presented with LAM  -Cr on admit 7.4  -Baseline Cr unknown, but Cr 1.1 in 08/2022  -Suspect due to decreased PO intake and home meds (amolidpine-olmarsartan and chlorthalidone)  -Renal US with no obstruction. Findings consistent with CKD  -IVF  -Nephrology consulted  -Avoid nephrotoxins, renal dose all meds.   -Monitor Is/Os  -Slowly improving daily  - recheck BMP in AM

## 2023-08-14 NOTE — ASSESSMENT & PLAN NOTE
Mgmt per IM/neph, creat improving.  Estimated Creatinine Clearance: 45.9 mL/min (A) (based on SCr of 1.8 mg/dL (H)).

## 2023-08-14 NOTE — SUBJECTIVE & OBJECTIVE
Review of Systems   Gastrointestinal:  Negative for melena.   Genitourinary:  Negative for hematuria.     Objective:     Vital Signs (Most Recent):  Temp: 98.9 °F (37.2 °C) (08/14/23 0701)  Pulse: 67 (08/14/23 0800)  Resp: (!) 21 (08/14/23 0800)  BP: (!) 143/80 (08/14/23 0800)  SpO2: (!) 92 % (08/14/23 0800) Vital Signs (24h Range):  Temp:  [98.4 °F (36.9 °C)-98.9 °F (37.2 °C)] 98.9 °F (37.2 °C)  Pulse:  [] 67  Resp:  [13-32] 21  SpO2:  [89 %-97 %] 92 %  BP: (100-160)/() 143/80     Weight: 93 kg (205 lb)  Body mass index is 31.17 kg/m².     SpO2: (!) 92 %         Intake/Output Summary (Last 24 hours) at 8/14/2023 0849  Last data filed at 8/14/2023 0800  Gross per 24 hour   Intake 5949.52 ml   Output 3625 ml   Net 2324.52 ml         Lines/Drains/Airways       Peripheral Intravenous Line  Duration                  Peripheral IV - Single Lumen 08/09/23 1900 20 G Left;Posterior;Proximal Forearm 4 days         Peripheral IV - Single Lumen 08/13/23 0700 18 G Distal;Posterior;Right Forearm 1 day                       Physical Exam  Constitutional:       General: He is not in acute distress.     Appearance: He is well-developed. He is obese. He is not ill-appearing, toxic-appearing or diaphoretic.   HENT:      Head: Normocephalic and atraumatic.   Eyes:      General: No scleral icterus.     Extraocular Movements: Extraocular movements intact.      Conjunctiva/sclera: Conjunctivae normal.      Pupils: Pupils are equal, round, and reactive to light.   Neck:      Thyroid: No thyromegaly.      Vascular: No JVD.      Trachea: No tracheal deviation.   Cardiovascular:      Rate and Rhythm: Normal rate and regular rhythm.      Heart sounds: S1 normal and S2 normal. Murmur heard.      Systolic murmur is present with a grade of 2/6.      No friction rub. No gallop.   Pulmonary:      Effort: Pulmonary effort is normal. No respiratory distress.      Breath sounds: Normal breath sounds. No stridor. No wheezing, rhonchi or  rales.   Chest:      Chest wall: No tenderness.   Abdominal:      General: There is no distension.      Palpations: Abdomen is soft.   Musculoskeletal:         General: No swelling or tenderness. Normal range of motion.      Cervical back: Normal range of motion and neck supple. No rigidity.      Right lower leg: No edema.      Left lower leg: No edema.   Skin:     General: Skin is warm and dry.      Coloration: Skin is not jaundiced.      Findings: No rash.   Neurological:      General: No focal deficit present.      Mental Status: He is alert and oriented to person, place, and time.      Cranial Nerves: No cranial nerve deficit.   Psychiatric:         Mood and Affect: Mood normal.         Behavior: Behavior normal.            Current Medications:   albuterol-ipratropium  3 mL Nebulization Q6H    allopurinoL  100 mg Oral Daily    apixaban  5 mg Oral BID    aspirin  81 mg Oral Daily    atorvastatin  40 mg Oral QHS    doxycycline  100 mg Oral Q12H    famotidine  20 mg Oral Daily    flecainide  100 mg Oral Q12H    magnesium sulfate IVPB  4 g Intravenous Once    metoprolol succinate  25 mg Oral Daily    nicotine  1 patch Transdermal Daily      dilTIAZem Stopped (08/13/23 6639)    lactated ringers Stopped (08/14/23 0758)     aluminum-magnesium hydroxide-simethicone, dextrose 50%, dextrose 50%, glucagon (human recombinant), glucose, glucose, naloxone, ondansetron, sodium chloride 0.9%    Laboratory (all labs reviewed):  CBC:  Recent Labs   Lab 08/09/23  0601 08/11/23  0326 08/12/23  0426 08/13/23  0600 08/14/23  0444   WBC 13.06 H 11.40 9.54 8.28 8.98   Hemoglobin 12.3 L 12.0 L 13.2 L 12.6 L 12.7 L   Hematocrit 35.8 L 36.0 L 38.8 L 38.1 L 37.8 L   Platelets 180 195 201 220 199         CHEMISTRIES:  Recent Labs   Lab 03/14/21  0438 03/15/21  0410 03/17/21  1110 08/10/23  1602 08/11/23  0326 08/12/23  0426 08/13/23  0600 08/14/23  0444   Glucose 99 81   < > 121 H 109 92 93 86   Sodium 138 137   < > 134 L 136 137 138 137    Potassium 3.7 4.1   < > 3.5 3.5 3.1 L 3.2 L 3.1 L   BUN 13 18   < > 56 H 50 H 47 H 34 H 30 H   Creatinine 0.8 0.8   < > 3.9 H 3.5 H 2.7 H 2.1 H 1.8 H   eGFR if non  >60 >60  --   --   --   --   --   --    eGFR  --   --    < > 16 A 19 A 26 A 35 A 42 A   Calcium 8.6 L 8.7   < > 8.5 L 8.5 L 8.8 8.7 8.8   Magnesium  --   --    < > 1.6 1.9 1.3 L 1.0 L 1.0 L    < > = values in this interval not displayed.         CARDIAC BIOMARKERS:  Recent Labs   Lab 03/13/21  1816 08/07/23  1202   CPK  --  58   Troponin I 0.016  --          COAGS:  Recent Labs   Lab 12/24/20  1236 03/13/21  1506 08/07/23  1936   INR 1.0 1.0 1.3 H         LIPIDS/LFTS:  Recent Labs   Lab 03/15/21  0410 03/17/21  1110 08/07/23  1202 08/11/23  0326 08/12/23  0426 08/13/23  0600 08/14/23  0444   Cholesterol 130  --   --   --   --   --   --    Triglycerides 98  --   --   --   --   --   --    HDL 35 L  --   --   --   --   --   --    LDL Cholesterol 75.4  --   --   --   --   --   --    Non-HDL Cholesterol 95  --   --   --   --   --   --    AST  --    < > 66 H  66 H 47 H 68 H 78 H 76 H   ALT  --    < > 73 H  73 H 48 H 54 H 65 H 64 H    < > = values in this interval not displayed.         BNP:        TSH:  Recent Labs   Lab 03/14/21  0438   TSH 3.152         Free T4:        Estimated Creatinine Clearance: 45.9 mL/min (A) (based on SCr of 1.8 mg/dL (H)).      Diagnostic Results:  ECG (personally reviewed and interpreted tracing(s)):  8/10/23 1037 , NSSTTW changes  8/11/23 0837 SR 56, QTc 528 (baseline 478)    L MPI 8/11/23 (images prev personally reviewed and interpreted)    Normal myocardial perfusion scan. There is no evidence of myocardial ischemia or infarction.    The gated perfusion images showed an ejection fraction of 64% post stress.    There is normal wall motion post stress.    KASHIF/DCCV 8/8/23    Left Ventricle: Normal wall motion. There is normal systolic function with a visually estimated ejection fraction of 55 - 60%.     Left Atrium: Left atrium is moderately dilated. There is no thrombus in the left atrial appendage.    Right Ventricle: Right ventricle was not well visualized due to poor acoustic window.    Aortic Valve: There is mild aortic valve sclerosis.    Mitral Valve: There is mild regurgitation.    Tricuspid Valve: There is mild regurgitation.    The pre-cardioversion rhythm was atrial fibrillation. A 200 J synchronized cardioversion was successfully performed with restoration of normal sinus rhythm. The post-cardioversion rhythm was normal sinus rhythm.    Echo: 8/7/23    Left Ventricle: The left ventricle is normal in size. Mildly increased wall thickness. Normal wall motion. There is normal systolic function with a visually estimated ejection fraction of 65 - 70%. Grade I diastolic dysfunction.    Left Atrium: Left atrium is severely dilated.    Right Ventricle: Normal right ventricular cavity size. Systolic function is normal.    Right Atrium: Right atrium is moderately dilated.    Aortic Valve: There is aortic valve sclerosis.    Tricuspid Valve: There is mild regurgitation.    Pulmonary Artery: The estimated pulmonary artery systolic pressure is 23 mmHg.    IVC/SVC: Normal venous pressure at 3 mmHg.

## 2023-08-14 NOTE — ASSESSMENT & PLAN NOTE
-BP as low as 63/41 in the ED  -Suspect due to pt continuing home meds (amolidpine-olmarsartan and chlorthalidone) in setting of LAM and decreased PO intake/dehydration  -Hypotension improved with fluids  -Continue IVF  -Pt with leukocytosis, suspect reactive. Low suspicion for infection at this time.   -Echo ordered and reviewed  -Hold home BP meds as above  -Now resolved, add back medications as needed

## 2023-08-14 NOTE — ASSESSMENT & PLAN NOTE
-WBC# 16.07 on admission w/hypotension. Pt afebrile. CXR with no acute process.   -Pt with leukocytosis, suspect reactive. Low suspicion for infection at this time.   -Hypotension improved with fluids  -Continue IVF  -Echo ordered  -Will monitor off abx at this time   -repeat labs in the AM   - resolved

## 2023-08-14 NOTE — NURSING
[facility]  Nurses Note -- 4 Eyes      8/14/2023       Skin assessed on: Q Shift      [x] No Pressure Injuries Present    [x]Prevention Measures Documented    [] Yes LDA  for Pressure Injury Previously documented     [] Yes New Pressure Injury Discovered   [] LDA for New Pressure Injury Added      Attending RN:  Linnea Alanis RN     Second RN:  Luma TALLEY RN

## 2023-08-14 NOTE — PLAN OF CARE
Patient remains in IUC on 1L of nasal cannula. AAO x4. VSS and afebrile. NSR on telemetry monitor. Continuous GTT of LR per MD orders. Adequate urine output per urinal,>100 mL, no BM. Patient transitioned to cardiac/diabetic diet, Pt tolerated well. Patient updated on plan of care per Dr. Butts. Free of injury, falls, and skin breakdown on this shift.

## 2023-08-14 NOTE — ASSESSMENT & PLAN NOTE
Patient with Paroxysmal (<7 days) atrial fibrillation which is uncontrolled currently with Amio initially but bad lungs, cannot use long term.. Patient is currently in atrial fibrillation.[id number] Score: The patient doesn't have any registry metric data available. HASBLED Score: ?. Anticoagulation indicated. Anticoagulation done with Apix.   · Patient DCCV x2, now on sotalol- will need home script at d/c  · In NSR now

## 2023-08-14 NOTE — PROGRESS NOTES
[facility] Intensive Care  Cardiology  Progress Note    Patient Name: Vivek Whitman  MRN: [id number]  Admission Date: 8/6/2023  Hospital Length of Stay: 7 days  Code Status: Full Code   Attending Physician: Anjel Butts MD   Primary Care Physician: No, Primary Doctor  Expected Discharge Date:   Principal Problem:ARF (acute renal failure)    Subjective:     Interval Hx: pt seen in ICU, case d/w Dr. Butts.  Converted to SR.  No cp/sob.    Tele: SR 60s (personally reviewed and interpreted)            Review of Systems   Gastrointestinal:  Negative for melena.   Genitourinary:  Negative for hematuria.     Objective:     Vital Signs (Most Recent):  Temp: 98.9 °F (37.2 °C) (08/14/23 0701)  Pulse: 67 (08/14/23 0800)  Resp: (!) 21 (08/14/23 0800)  BP: (!) 143/80 (08/14/23 0800)  SpO2: (!) 92 % (08/14/23 0800) Vital Signs (24h Range):  Temp:  [98.4 °F (36.9 °C)-98.9 °F (37.2 °C)] 98.9 °F (37.2 °C)  Pulse:  [] 67  Resp:  [13-32] 21  SpO2:  [89 %-97 %] 92 %  BP: (100-160)/() 143/80     Weight: 93 kg (205 lb)  Body mass index is 31.17 kg/m².     SpO2: (!) 92 %         Intake/Output Summary (Last 24 hours) at 8/14/2023 0849  Last data filed at 8/14/2023 0800  Gross per 24 hour   Intake 5949.52 ml   Output 3625 ml   Net 2324.52 ml         Lines/Drains/Airways       Peripheral Intravenous Line  Duration                  Peripheral IV - Single Lumen 08/09/23 1900 20 G Left;Posterior;Proximal Forearm 4 days         Peripheral IV - Single Lumen 08/13/23 0700 18 G Distal;Posterior;Right Forearm 1 day                       Physical Exam  Constitutional:       General: He is not in acute distress.     Appearance: He is well-developed. He is obese. He is not ill-appearing, toxic-appearing or diaphoretic.   HENT:      Head: Normocephalic and atraumatic.   Eyes:      General: No scleral icterus.     Extraocular Movements: Extraocular movements intact.      Conjunctiva/sclera: Conjunctivae normal.      Pupils: Pupils are equal,  round, and reactive to light.   Neck:      Thyroid: No thyromegaly.      Vascular: No JVD.      Trachea: No tracheal deviation.   Cardiovascular:      Rate and Rhythm: Normal rate and regular rhythm.      Heart sounds: S1 normal and S2 normal. Murmur heard.      Systolic murmur is present with a grade of 2/6.      No friction rub. No gallop.   Pulmonary:      Effort: Pulmonary effort is normal. No respiratory distress.      Breath sounds: Normal breath sounds. No stridor. No wheezing, rhonchi or rales.   Chest:      Chest wall: No tenderness.   Abdominal:      General: There is no distension.      Palpations: Abdomen is soft.   Musculoskeletal:         General: No swelling or tenderness. Normal range of motion.      Cervical back: Normal range of motion and neck supple. No rigidity.      Right lower leg: No edema.      Left lower leg: No edema.   Skin:     General: Skin is warm and dry.      Coloration: Skin is not jaundiced.      Findings: No rash.   Neurological:      General: No focal deficit present.      Mental Status: He is alert and oriented to person, place, and time.      Cranial Nerves: No cranial nerve deficit.   Psychiatric:         Mood and Affect: Mood normal.         Behavior: Behavior normal.            Current Medications:   albuterol-ipratropium  3 mL Nebulization Q6H    allopurinoL  100 mg Oral Daily    apixaban  5 mg Oral BID    aspirin  81 mg Oral Daily    atorvastatin  40 mg Oral QHS    doxycycline  100 mg Oral Q12H    famotidine  20 mg Oral Daily    flecainide  100 mg Oral Q12H    magnesium sulfate IVPB  4 g Intravenous Once    metoprolol succinate  25 mg Oral Daily    nicotine  1 patch Transdermal Daily      dilTIAZem Stopped (08/13/23 3087)    lactated ringers Stopped (08/14/23 6107)     aluminum-magnesium hydroxide-simethicone, dextrose 50%, dextrose 50%, glucagon (human recombinant), glucose, glucose, naloxone, ondansetron, sodium chloride 0.9%    Laboratory (all labs  reviewed):  CBC:  Recent Labs   Lab 08/09/23  0601 08/11/23  0326 08/12/23  0426 08/13/23  0600 08/14/23  0444   WBC 13.06 H 11.40 9.54 8.28 8.98   Hemoglobin 12.3 L 12.0 L 13.2 L 12.6 L 12.7 L   Hematocrit 35.8 L 36.0 L 38.8 L 38.1 L 37.8 L   Platelets 180 195 201 220 199         CHEMISTRIES:  Recent Labs   Lab 03/14/21  0438 03/15/21  0410 03/17/21  1110 08/10/23  1602 08/11/23  0326 08/12/23  0426 08/13/23  0600 08/14/23  0444   Glucose 99 81   < > 121 H 109 92 93 86   Sodium 138 137   < > 134 L 136 137 138 137   Potassium 3.7 4.1   < > 3.5 3.5 3.1 L 3.2 L 3.1 L   BUN 13 18   < > 56 H 50 H 47 H 34 H 30 H   Creatinine 0.8 0.8   < > 3.9 H 3.5 H 2.7 H 2.1 H 1.8 H   eGFR if non  >60 >60  --   --   --   --   --   --    eGFR  --   --    < > 16 A 19 A 26 A 35 A 42 A   Calcium 8.6 L 8.7   < > 8.5 L 8.5 L 8.8 8.7 8.8   Magnesium  --   --    < > 1.6 1.9 1.3 L 1.0 L 1.0 L    < > = values in this interval not displayed.         CARDIAC BIOMARKERS:  Recent Labs   Lab 03/13/21  1816 08/07/23  1202   CPK  --  58   Troponin I 0.016  --          COAGS:  Recent Labs   Lab 12/24/20  1236 03/13/21  1506 08/07/23  1936   INR 1.0 1.0 1.3 H         LIPIDS/LFTS:  Recent Labs   Lab 03/15/21  0410 03/17/21  1110 08/07/23  1202 08/11/23  0326 08/12/23  0426 08/13/23  0600 08/14/23  0444   Cholesterol 130  --   --   --   --   --   --    Triglycerides 98  --   --   --   --   --   --    HDL 35 L  --   --   --   --   --   --    LDL Cholesterol 75.4  --   --   --   --   --   --    Non-HDL Cholesterol 95  --   --   --   --   --   --    AST  --    < > 66 H  66 H 47 H 68 H 78 H 76 H   ALT  --    < > 73 H  73 H 48 H 54 H 65 H 64 H    < > = values in this interval not displayed.         BNP:        TSH:  Recent Labs   Lab 03/14/21  0438   TSH 3.152         Free T4:        Estimated Creatinine Clearance: 45.9 mL/min (A) (based on SCr of 1.8 mg/dL (H)).      Diagnostic Results:  ECG (personally reviewed and interpreted  tracing(s)):  8/10/23 1037 , NSSTTW changes  8/11/23 0837 SR 56, QTc 528 (baseline 478)    L MPI 8/11/23 (images prev personally reviewed and interpreted)    Normal myocardial perfusion scan. There is no evidence of myocardial ischemia or infarction.    The gated perfusion images showed an ejection fraction of 64% post stress.    There is normal wall motion post stress.    KASHIF/DCCV 8/8/23    Left Ventricle: Normal wall motion. There is normal systolic function with a visually estimated ejection fraction of 55 - 60%.    Left Atrium: Left atrium is moderately dilated. There is no thrombus in the left atrial appendage.    Right Ventricle: Right ventricle was not well visualized due to poor acoustic window.    Aortic Valve: There is mild aortic valve sclerosis.    Mitral Valve: There is mild regurgitation.    Tricuspid Valve: There is mild regurgitation.    The pre-cardioversion rhythm was atrial fibrillation. A 200 J synchronized cardioversion was successfully performed with restoration of normal sinus rhythm. The post-cardioversion rhythm was normal sinus rhythm.    Echo: 8/7/23    Left Ventricle: The left ventricle is normal in size. Mildly increased wall thickness. Normal wall motion. There is normal systolic function with a visually estimated ejection fraction of 65 - 70%. Grade I diastolic dysfunction.    Left Atrium: Left atrium is severely dilated.    Right Ventricle: Normal right ventricular cavity size. Systolic function is normal.    Right Atrium: Right atrium is moderately dilated.    Aortic Valve: There is aortic valve sclerosis.    Tricuspid Valve: There is mild regurgitation.    Pulmonary Artery: The estimated pulmonary artery systolic pressure is 23 mmHg.    IVC/SVC: Normal venous pressure at 3 mmHg.        Assessment and Plan:     * ARF (acute renal failure)  Mgmt per IM/neph, creat improving.  Estimated Creatinine Clearance: 45.9 mL/min (A) (based on SCr of 1.8 mg/dL  (H)).      Paroxysmal atrial fibrillation  Now back in SR.  Cont DOAC  Priscilla MPI neg.    Cont flecainide 100mg bid and metoprolol ER.  Check EKG    Heroin abuse  Abstinence recommended    Tobacco abuse  Cessation recommended            VTE Risk Mitigation (From admission, onward)         Ordered     apixaban tablet 5 mg  2 times daily         08/08/23 1634     IP VTE LOW RISK PATIENT  Once         08/07/23 0211     Place sequential compression device  Until discontinued         08/07/23 0211              Dispo planning appropriate.    Win Caceres MD  Cardiology  [facility] - Intensive Care

## 2023-08-14 NOTE — PROGRESS NOTES
[facility] Medicine  Progress Note    Patient Name: Vivek Whitman  MRN: [id number]  Patient Class: IP- Inpatient   Admission Date: 8/6/2023  Length of Stay: 7 days  Attending Physician: Anjel Butts MD  Primary Care Provider: Racheal, Primary Doctor        Subjective:     Principal Problem:ARF (acute renal failure)        HPI:   62 y.o. male, with a PMHx of HTN, HLD, tobacco abuse, and hx of heroin abuse/alcohol abuse transferred from [facility] ED for further evaluation. He presented to the ED with cocerns of low blood pressure. Stated for the past week, he had low blood pressure readings with SBP 90-100s and DBP 50s. Noted hypotension associated with feeling fatigue, weak, and make him just want to sleep all day. Stated he has not eat or drink much. However, continued to take his home BP medications. Tried to hydrate himself with gatorade prior to coming to the ED. Denies any fever, chest pain, cough, shortness of breath, abdominal pain, nausea or vomiting. Admits he still smokes 1ppd, smoke for the past 40+ years.     In the ED, pt hypotensive with BP 94/53. Patient given IVF. Labs significant for leukocytosis, elevated Cr of 7.4, and elevated phosphorus. US renal with no evidence for hydronephrosis bilaterally.  CXR with no focal consolidations or edema. Patient admitted to  for further evaluation.       Overview/Hospital Course:  63 yo M admitted with hypotension and leukocytosis, and feeling unwell for the past couple weeks.  There was concern for possible infection so patient started on broad-spectrum IV antibiotics.  Also with acute renal failure and Nephrology consulted.  Hospital course complicated by atrial fibrillation.  Cardiology following.  He underwent DCCV with urine improvement.  Transferred to the floor.  Patient again developed atrial fibrillation with RVR.  Started on sotalol.  Replacing electrolytes.  Blood pressure improving.  PT/OT consulted.        Interval History: no acute  issues, in sinus rhythm and off drips.     Review of Systems  Objective:     Vital Signs (Most Recent):  Temp: 98.9 °F (37.2 °C) (08/14/23 0701)  Pulse: 67 (08/14/23 1000)  Resp: (!) 22 (08/14/23 1000)  BP: 133/76 (08/14/23 1000)  SpO2: 95 % (08/14/23 1000) Vital Signs (24h Range):  Temp:  [98.4 °F (36.9 °C)-98.9 °F (37.2 °C)] 98.9 °F (37.2 °C)  Pulse:  [] 67  Resp:  [13-32] 22  SpO2:  [89 %-97 %] 95 %  BP: (100-160)/() 133/76     Weight: 93 kg (205 lb)  Body mass index is 31.17 kg/m².    Intake/Output Summary (Last 24 hours) at 8/14/2023 1053  Last data filed at 8/14/2023 1000  Gross per 24 hour   Intake 6195.22 ml   Output 3175 ml   Net 3020.22 ml           Physical Exam  Constitutional:       Appearance: Normal appearance. He is not ill-appearing.   Cardiovascular:      Rate and Rhythm: Normal rate and regular rhythm.   Pulmonary:      Effort: Pulmonary effort is normal. No respiratory distress.   Musculoskeletal:         General: No swelling.   Skin:     Coloration: Skin is not jaundiced.   Neurological:      General: No focal deficit present.      Mental Status: He is alert and oriented to person, place, and time.             Significant Labs: All pertinent labs within the past 24 hours have been reviewed.  BMP:   Recent Labs   Lab 08/14/23  0444   GLU 86      K 3.1*   CL 99   CO2 29   BUN 30*   CREATININE 1.8*   CALCIUM 8.8   MG 1.0*       CBC:   Recent Labs   Lab 08/13/23  0600 08/14/23  0444   WBC 8.28 8.98   HGB 12.6* 12.7*   HCT 38.1* 37.8*    199         Significant Imaging: I have reviewed all pertinent imaging results/findings within the past 24 hours.      Assessment/Plan:      * ARF (acute renal failure)  Patient with acute kidney injury/acute renal failure likely due to pre-renal azotemia due to dehydration LAM is currently worsening. Baseline creatinine unknown - Labs reviewed- Renal function/electrolytes with Estimated Creatinine Clearance: 45.9 mL/min (A) (based on SCr of  "1.8 mg/dL (H)). according to latest data. Monitor urine output and serial BMP and adjust therapy as needed. Avoid nephrotoxins and renally dose meds for GFR listed above.    -Presented with LAM  -Cr on admit 7.4  -Baseline Cr unknown, but Cr 1.1 in 08/2022  -Suspect due to decreased PO intake and home meds (amolidpine-olmarsartan and chlorthalidone)  -Renal US with no obstruction. Findings consistent with CKD  -IVF  -Nephrology consulted  -Avoid nephrotoxins, renal dose all meds.   -Monitor Is/Os  -Slowly improving daily  - recheck BMP in AM        Sepsis  This patient does not have evidence of infective focus  My overall impression is sepsis.  Source: Unknown  Antibiotics given-   Antibiotics (72h ago, onward)    Start     Stop Route Frequency Ordered    08/08/23 1015  doxycycline tablet 100 mg         -- Oral Every 12 hours 08/08/23 0901    08/08/23 0900  mupirocin 2 % ointment         08/13/23 0859 Nasl 2 times daily 08/08/23 0725        Latest lactate reviewed-  No results for input(s): "LACTATE" in the last 72 hours.  Organ dysfunction indicated by Acute kidney injury and New afib    Fluid challenge Actual Body weight- Patient will receive 30ml/kg actual body weight to calculate fluid bolus for treatment of septic shock.     Post- resuscitation assessment Yes Perfusion exam was performed within 6 hours of septic shock presentation after bolus shows Adequate tissue perfusion assessed by non-invasive monitoring       Will ordered but not started, responding to fluids Pressors- Levophed for MAP of 65  Source control achieved by: Abx and fluids    Patient admitted with hypotension and leukocytosis, and feeling unwell for the past couple weeks.  Patient's blood pressure again low today at 70/49, concerns he could bacteremia or another infection.  No skin infections appreciated. Urine clean. No consolidation on CXR.  Initial presentation of AFib (and ARF)   He is afebrile, however this does not exclude infection.  "   Blood cultures taken.  , also concerning for an ongoing inflammatory issue/infection.    Given acute renal failure, will hold off on vancomycin for now.    Will initiate IV cefepime and oral doxycycline.   Will await cultures to tailor antibiotics or deescalate.    Unclear source, WBC normalized today 8/11, will transition to oral at d/c tomorrow to compete 7d course     Paroxysmal atrial fibrillation  Patient with Paroxysmal (<7 days) atrial fibrillation which is uncontrolled currently with Amio initially but bad lungs, cannot use long term.. Patient is currently in atrial fibrillation.[id number] Score: The patient doesn't have any registry metric data available. HASBLED Score: ?. Anticoagulation indicated. Anticoagulation done with Apix.   · Patient DCCV x2, now on sotalol- will need home script at d/c  · In NSR now    Class 1 obesity with body mass index (BMI) of 31.0 to 31.9 in adult  Body mass index is 31.17 kg/m². Morbid obesity complicates all aspects of disease management from diagnostic modalities to treatment. Weight loss encouraged and health benefits explained to patient.         Leukocytosis  -WBC# 16.07 on admission w/hypotension. Pt afebrile. CXR with no acute process.   -Pt with leukocytosis, suspect reactive. Low suspicion for infection at this time.   -Hypotension improved with fluids  -Continue IVF  -Echo ordered  -Will monitor off abx at this time   -repeat labs in the AM   - resolved    Hypotension  -BP as low as 63/41 in the ED  -Suspect due to pt continuing home meds (amolidpine-olmarsartan and chlorthalidone) in setting of LAM and decreased PO intake/dehydration  -Hypotension improved with fluids  -Continue IVF  -Pt with leukocytosis, suspect reactive. Low suspicion for infection at this time.   -Echo ordered and reviewed  -Hold home BP meds as above  -Now resolved, add back medications as needed    Dehydration  -Continue IVF as above  -See plan for LAM    Tobacco abuse  - Patient  was counseled regarding smoking for 3-10 minutes.  - Encourage smoking cessation daily  - NRT offered     Tobacco dependence         Heroin abuse  - denies recent use, UDS neg on admission        VTE Risk Mitigation (From admission, onward)         Ordered     apixaban tablet 5 mg  2 times daily         08/08/23 1634     IP VTE LOW RISK PATIENT  Once         08/07/23 0211     Place sequential compression device  Until discontinued         08/07/23 0211                Discharge Planning   ELOIDA:      Code Status: Full Code   Is the patient medically ready for discharge?:     Reason for patient still in hospital (select all that apply): Treatment  Discharge Plan A: Home with family            Critical care time spent on the evaluation and treatment of severe organ dysfunction, review of pertinent labs and imaging studies, discussions with consulting providers and discussions with patient/family: 20 minutes.      Anjel Butts MD  Department of Hospital Medicine   [facility] - [place], [place] - Intensive Care

## 2023-08-14 NOTE — PROGRESS NOTES
RN reporting am labs:  K 3.1  Mg 1.1  Phos 3.1  Cr 1.8    Adding PO Kcl 60meq x 1 now w/ Mg sulf 4gm IV x 1 now.

## 2023-08-14 NOTE — SUBJECTIVE & OBJECTIVE
Interval History: no acute issues, in sinus rhythm and off drips.     Review of Systems  Objective:     Vital Signs (Most Recent):  Temp: 98.9 °F (37.2 °C) (08/14/23 0701)  Pulse: 67 (08/14/23 1000)  Resp: (!) 22 (08/14/23 1000)  BP: 133/76 (08/14/23 1000)  SpO2: 95 % (08/14/23 1000) Vital Signs (24h Range):  Temp:  [98.4 °F (36.9 °C)-98.9 °F (37.2 °C)] 98.9 °F (37.2 °C)  Pulse:  [] 67  Resp:  [13-32] 22  SpO2:  [89 %-97 %] 95 %  BP: (100-160)/() 133/76     Weight: 93 kg (205 lb)  Body mass index is 31.17 kg/m².    Intake/Output Summary (Last 24 hours) at 8/14/2023 1053  Last data filed at 8/14/2023 1000  Gross per 24 hour   Intake 6195.22 ml   Output 3175 ml   Net 3020.22 ml           Physical Exam  Constitutional:       Appearance: Normal appearance. He is not ill-appearing.   Cardiovascular:      Rate and Rhythm: Normal rate and regular rhythm.   Pulmonary:      Effort: Pulmonary effort is normal. No respiratory distress.   Musculoskeletal:         General: No swelling.   Skin:     Coloration: Skin is not jaundiced.   Neurological:      General: No focal deficit present.      Mental Status: He is alert and oriented to person, place, and time.             Significant Labs: All pertinent labs within the past 24 hours have been reviewed.  BMP:   Recent Labs   Lab 08/14/23  0444   GLU 86      K 3.1*   CL 99   CO2 29   BUN 30*   CREATININE 1.8*   CALCIUM 8.8   MG 1.0*       CBC:   Recent Labs   Lab 08/13/23  0600 08/14/23  0444   WBC 8.28 8.98   HGB 12.6* 12.7*   HCT 38.1* 37.8*    199         Significant Imaging: I have reviewed all pertinent imaging results/findings within the past 24 hours.

## 2023-08-15 VITALS
BODY MASS INDEX: 31.07 KG/M2 | OXYGEN SATURATION: 94 % | TEMPERATURE: 99 F | HEIGHT: 68 IN | DIASTOLIC BLOOD PRESSURE: 78 MMHG | RESPIRATION RATE: 21 BRPM | HEART RATE: 66 BPM | WEIGHT: 205 LBS | SYSTOLIC BLOOD PRESSURE: 155 MMHG

## 2023-08-15 LAB
ALBUMIN SERPL BCP-MCNC: 2.2 G/DL (ref 3.5–5.2)
ALP SERPL-CCNC: 78 U/L (ref 55–135)
ALT SERPL W/O P-5'-P-CCNC: 60 U/L (ref 10–44)
ANION GAP SERPL CALC-SCNC: 7 MMOL/L (ref 8–16)
AST SERPL-CCNC: 63 U/L (ref 10–40)
BASOPHILS # BLD AUTO: 0.05 K/UL (ref 0–0.2)
BASOPHILS NFR BLD: 0.5 % (ref 0–1.9)
BILIRUB SERPL-MCNC: 0.7 MG/DL (ref 0.1–1)
BUN SERPL-MCNC: 25 MG/DL (ref 8–23)
CALCIUM SERPL-MCNC: 8.5 MG/DL (ref 8.7–10.5)
CHLORIDE SERPL-SCNC: 101 MMOL/L (ref 95–110)
CO2 SERPL-SCNC: 28 MMOL/L (ref 23–29)
CREAT SERPL-MCNC: 1.6 MG/DL (ref 0.5–1.4)
DIFFERENTIAL METHOD: ABNORMAL
EOSINOPHIL # BLD AUTO: 0.3 K/UL (ref 0–0.5)
EOSINOPHIL NFR BLD: 2.8 % (ref 0–8)
ERYTHROCYTE [DISTWIDTH] IN BLOOD BY AUTOMATED COUNT: 14.2 % (ref 11.5–14.5)
EST. GFR  (NO RACE VARIABLE): 48 ML/MIN/1.73 M^2
GLUCOSE SERPL-MCNC: 117 MG/DL (ref 70–110)
HCT VFR BLD AUTO: 36.3 % (ref 40–54)
HGB BLD-MCNC: 12.3 G/DL (ref 14–18)
IMM GRANULOCYTES # BLD AUTO: 0.04 K/UL (ref 0–0.04)
IMM GRANULOCYTES NFR BLD AUTO: 0.4 % (ref 0–0.5)
LYMPHOCYTES # BLD AUTO: 1.8 K/UL (ref 1–4.8)
LYMPHOCYTES NFR BLD: 18.1 % (ref 18–48)
MAGNESIUM SERPL-MCNC: 1.5 MG/DL (ref 1.6–2.6)
MCH RBC QN AUTO: 30.2 PG (ref 27–31)
MCHC RBC AUTO-ENTMCNC: 33.9 G/DL (ref 32–36)
MCV RBC AUTO: 89 FL (ref 82–98)
MONOCYTES # BLD AUTO: 0.9 K/UL (ref 0.3–1)
MONOCYTES NFR BLD: 9.4 % (ref 4–15)
NEUTROPHILS # BLD AUTO: 6.7 K/UL (ref 1.8–7.7)
NEUTROPHILS NFR BLD: 68.8 % (ref 38–73)
NRBC BLD-RTO: 0 /100 WBC
PHOSPHATE SERPL-MCNC: 2.5 MG/DL (ref 2.7–4.5)
PLATELET # BLD AUTO: 185 K/UL (ref 150–450)
PMV BLD AUTO: 9.5 FL (ref 9.2–12.9)
POTASSIUM SERPL-SCNC: 3.5 MMOL/L (ref 3.5–5.1)
PROT SERPL-MCNC: 5.8 G/DL (ref 6–8.4)
RBC # BLD AUTO: 4.07 M/UL (ref 4.6–6.2)
SODIUM SERPL-SCNC: 136 MMOL/L (ref 136–145)
WBC # BLD AUTO: 9.73 K/UL (ref 3.9–12.7)

## 2023-08-15 PROCEDURE — 85025 COMPLETE CBC W/AUTO DIFF WBC: CPT | Performed by: STUDENT IN AN ORGANIZED HEALTH CARE EDUCATION/TRAINING PROGRAM

## 2023-08-15 PROCEDURE — 97168 OT RE-EVAL EST PLAN CARE: CPT

## 2023-08-15 PROCEDURE — 97164 PT RE-EVAL EST PLAN CARE: CPT

## 2023-08-15 PROCEDURE — 25000003 PHARM REV CODE 250: Performed by: STUDENT IN AN ORGANIZED HEALTH CARE EDUCATION/TRAINING PROGRAM

## 2023-08-15 PROCEDURE — 94640 AIRWAY INHALATION TREATMENT: CPT

## 2023-08-15 PROCEDURE — 94761 N-INVAS EAR/PLS OXIMETRY MLT: CPT

## 2023-08-15 PROCEDURE — 99233 PR SUBSEQUENT HOSPITAL CARE,LEVL III: ICD-10-PCS | Mod: ,,, | Performed by: INTERNAL MEDICINE

## 2023-08-15 PROCEDURE — 25000242 PHARM REV CODE 250 ALT 637 W/ HCPCS: Performed by: STUDENT IN AN ORGANIZED HEALTH CARE EDUCATION/TRAINING PROGRAM

## 2023-08-15 PROCEDURE — 63600175 PHARM REV CODE 636 W HCPCS: Performed by: HOSPITALIST

## 2023-08-15 PROCEDURE — 36415 COLL VENOUS BLD VENIPUNCTURE: CPT | Performed by: STUDENT IN AN ORGANIZED HEALTH CARE EDUCATION/TRAINING PROGRAM

## 2023-08-15 PROCEDURE — 84100 ASSAY OF PHOSPHORUS: CPT | Performed by: STUDENT IN AN ORGANIZED HEALTH CARE EDUCATION/TRAINING PROGRAM

## 2023-08-15 PROCEDURE — 99233 SBSQ HOSP IP/OBS HIGH 50: CPT | Mod: ,,, | Performed by: INTERNAL MEDICINE

## 2023-08-15 PROCEDURE — 83735 ASSAY OF MAGNESIUM: CPT | Performed by: STUDENT IN AN ORGANIZED HEALTH CARE EDUCATION/TRAINING PROGRAM

## 2023-08-15 PROCEDURE — S4991 NICOTINE PATCH NONLEGEND: HCPCS | Performed by: STUDENT IN AN ORGANIZED HEALTH CARE EDUCATION/TRAINING PROGRAM

## 2023-08-15 PROCEDURE — 80053 COMPREHEN METABOLIC PANEL: CPT | Performed by: STUDENT IN AN ORGANIZED HEALTH CARE EDUCATION/TRAINING PROGRAM

## 2023-08-15 RX ORDER — METOPROLOL SUCCINATE 25 MG/1
25 TABLET, EXTENDED RELEASE ORAL DAILY
Qty: 30 TABLET | Refills: 11 | Status: ON HOLD | OUTPATIENT
Start: 2023-08-16 | End: 2023-09-30 | Stop reason: HOSPADM

## 2023-08-15 RX ORDER — NICOTINE 7MG/24HR
1 PATCH, TRANSDERMAL 24 HOURS TRANSDERMAL DAILY
Qty: 28 PATCH | Refills: 1 | Status: SHIPPED | OUTPATIENT
Start: 2023-08-15 | End: 2023-12-26

## 2023-08-15 RX ORDER — MAGNESIUM SULFATE HEPTAHYDRATE 40 MG/ML
2 INJECTION, SOLUTION INTRAVENOUS ONCE
Status: COMPLETED | OUTPATIENT
Start: 2023-08-15 | End: 2023-08-15

## 2023-08-15 RX ORDER — ALLOPURINOL 100 MG/1
100 TABLET ORAL DAILY
Qty: 30 TABLET | Refills: 1 | Status: SHIPPED | OUTPATIENT
Start: 2023-08-16 | End: 2023-10-24 | Stop reason: SDUPTHER

## 2023-08-15 RX ORDER — FLECAINIDE ACETATE 100 MG/1
100 TABLET ORAL EVERY 12 HOURS
Qty: 60 TABLET | Refills: 1 | Status: SHIPPED | OUTPATIENT
Start: 2023-08-15 | End: 2024-03-26

## 2023-08-15 RX ORDER — FAMOTIDINE 20 MG/1
20 TABLET, FILM COATED ORAL DAILY
Qty: 30 TABLET | Refills: 1 | Status: SHIPPED | OUTPATIENT
Start: 2023-08-15 | End: 2023-10-24 | Stop reason: SDUPTHER

## 2023-08-15 RX ORDER — FAMOTIDINE 20 MG/1
20 TABLET, FILM COATED ORAL DAILY
Qty: 30 TABLET | Refills: 1 | Status: SHIPPED | OUTPATIENT
Start: 2023-08-15 | End: 2023-08-15 | Stop reason: SDUPTHER

## 2023-08-15 RX ADMIN — ALLOPURINOL 100 MG: 100 TABLET ORAL at 08:08

## 2023-08-15 RX ADMIN — MAGNESIUM SULFATE HEPTAHYDRATE 2 G: 40 INJECTION, SOLUTION INTRAVENOUS at 08:08

## 2023-08-15 RX ADMIN — IPRATROPIUM BROMIDE AND ALBUTEROL SULFATE 3 ML: 2.5; .5 SOLUTION RESPIRATORY (INHALATION) at 01:08

## 2023-08-15 RX ADMIN — ASPIRIN 81 MG: 81 TABLET, COATED ORAL at 08:08

## 2023-08-15 RX ADMIN — DOXYCYCLINE HYCLATE 100 MG: 100 TABLET, COATED ORAL at 08:08

## 2023-08-15 RX ADMIN — FAMOTIDINE 20 MG: 20 TABLET, FILM COATED ORAL at 08:08

## 2023-08-15 RX ADMIN — IPRATROPIUM BROMIDE AND ALBUTEROL SULFATE 3 ML: 2.5; .5 SOLUTION RESPIRATORY (INHALATION) at 08:08

## 2023-08-15 RX ADMIN — METOPROLOL SUCCINATE 25 MG: 25 TABLET, EXTENDED RELEASE ORAL at 08:08

## 2023-08-15 RX ADMIN — IPRATROPIUM BROMIDE AND ALBUTEROL SULFATE 3 ML: 2.5; .5 SOLUTION RESPIRATORY (INHALATION) at 12:08

## 2023-08-15 RX ADMIN — NICOTINE 1 PATCH: 7 PATCH, EXTENDED RELEASE TRANSDERMAL at 08:08

## 2023-08-15 RX ADMIN — FLECAINIDE ACETATE 100 MG: 50 TABLET ORAL at 08:08

## 2023-08-15 RX ADMIN — APIXABAN 5 MG: 5 TABLET, FILM COATED ORAL at 08:08

## 2023-08-15 NOTE — PROGRESS NOTES
[facility]ensive [facility]  Cardiology  Progress Note    Patient Name: Vivek Whitman  MRN: [id number]  Admission Date: 8/6/2023  Hospital Length of Stay: 8 days  Code Status: Full Code   Attending Physician: Vignesh Navarro MD   Primary Care Physician: Racheal, Primary Doctor  Expected Discharge Date:   Principal Problem:ARF (acute renal failure)    Subjective:     Interval Hx: pt seen in ICU, no cp/sob, remains in SR.    Tele: SR 60s (personally reviewed and interpreted)            Review of Systems   Gastrointestinal:  Negative for melena.   Genitourinary:  Negative for hematuria.     Objective:     Vital Signs (Most Recent):  Temp: 98.7 °F (37.1 °C) (08/15/23 0305)  Pulse: 65 (08/15/23 0505)  Resp: (!) 50 (08/15/23 0505)  BP: 139/72 (08/15/23 0505)  SpO2: (!) 93 % (08/15/23 0505) Vital Signs (24h Range):  Temp:  [98.7 °F (37.1 °C)-99.2 °F (37.3 °C)] 98.7 °F (37.1 °C)  Pulse:  [56-74] 65  Resp:  [16-50] 50  SpO2:  [88 %-99 %] 93 %  BP: (104-160)/(55-88) 139/72     Weight: 93 kg (205 lb)  Body mass index is 31.17 kg/m².     SpO2: (!) 93 %         Intake/Output Summary (Last 24 hours) at 8/15/2023 0653  Last data filed at 8/15/2023 0617  Gross per 24 hour   Intake 3115.5 ml   Output 2750 ml   Net 365.5 ml         Lines/Drains/Airways       Peripheral Intravenous Line  Duration                  Peripheral IV - Single Lumen 08/09/23 1900 20 G Left;Posterior;Proximal Forearm 5 days                     Exam unchanged vs 8/14/23  Physical Exam  Constitutional:       General: He is not in acute distress.     Appearance: He is well-developed. He is obese. He is not ill-appearing, toxic-appearing or diaphoretic.   HENT:      Head: Normocephalic and atraumatic.   Eyes:      General: No scleral icterus.     Extraocular Movements: Extraocular movements intact.      Conjunctiva/sclera: Conjunctivae normal.      Pupils: Pupils are equal, round, and reactive to light.   Neck:      Thyroid: No thyromegaly.      Vascular: No JVD.       Trachea: No tracheal deviation.   Cardiovascular:      Rate and Rhythm: Normal rate and regular rhythm.      Heart sounds: S1 normal and S2 normal. Murmur heard.      Systolic murmur is present with a grade of 2/6.      No friction rub. No gallop.   Pulmonary:      Effort: Pulmonary effort is normal. No respiratory distress.      Breath sounds: Normal breath sounds. No stridor. No wheezing, rhonchi or rales.   Chest:      Chest wall: No tenderness.   Abdominal:      General: There is no distension.      Palpations: Abdomen is soft.   Musculoskeletal:         General: No swelling or tenderness. Normal range of motion.      Cervical back: Normal range of motion and neck supple. No rigidity.      Right lower leg: No edema.      Left lower leg: No edema.   Skin:     General: Skin is warm and dry.      Coloration: Skin is not jaundiced.      Findings: No rash.   Neurological:      General: No focal deficit present.      Mental Status: He is alert and oriented to person, place, and time.      Cranial Nerves: No cranial nerve deficit.   Psychiatric:         Mood and Affect: Mood normal.         Behavior: Behavior normal.            Current Medications:   albuterol-ipratropium  3 mL Nebulization Q6H    allopurinoL  100 mg Oral Daily    apixaban  5 mg Oral BID    aspirin  81 mg Oral Daily    atorvastatin  40 mg Oral QHS    doxycycline  100 mg Oral Q12H    famotidine  20 mg Oral Daily    flecainide  100 mg Oral Q12H    magnesium sulfate IVPB  2 g Intravenous Once    metoprolol succinate  25 mg Oral Daily    nicotine  1 patch Transdermal Daily      lactated ringers 100 mL/hr at 08/15/23 0617     aluminum-magnesium hydroxide-simethicone, dextrose 50%, dextrose 50%, glucagon (human recombinant), glucose, glucose, naloxone, ondansetron, sodium chloride 0.9%    Laboratory (all labs reviewed):  CBC:  Recent Labs   Lab 08/11/23  0326 08/12/23  0426 08/13/23  0600 08/14/23  0444 08/15/23  0509   WBC 11.40 9.54 8.28 8.98  9.73   Hemoglobin 12.0 L 13.2 L 12.6 L 12.7 L 12.3 L   Hematocrit 36.0 L 38.8 L 38.1 L 37.8 L 36.3 L   Platelets 195 201 220 199 185         CHEMISTRIES:  Recent Labs   Lab 03/14/21  0438 03/15/21  0410 03/17/21  1110 08/11/23  0326 08/12/23  0426 08/13/23  0600 08/14/23  0444 08/15/23  0509   Glucose 99 81   < > 109 92 93 86 117 H   Sodium 138 137   < > 136 137 138 137 136   Potassium 3.7 4.1   < > 3.5 3.1 L 3.2 L 3.1 L 3.5   BUN 13 18   < > 50 H 47 H 34 H 30 H 25 H   Creatinine 0.8 0.8   < > 3.5 H 2.7 H 2.1 H 1.8 H 1.6 H   eGFR if non  >60 >60  --   --   --   --   --   --    eGFR  --   --    < > 19 A 26 A 35 A 42 A 48 A   Calcium 8.6 L 8.7   < > 8.5 L 8.8 8.7 8.8 8.5 L   Magnesium  --   --    < > 1.9 1.3 L 1.0 L 1.0 L 1.5 L    < > = values in this interval not displayed.         CARDIAC BIOMARKERS:  Recent Labs   Lab 03/13/21  1816 08/07/23  1202   CPK  --  58   Troponin I 0.016  --          COAGS:  Recent Labs   Lab 12/24/20  1236 03/13/21  1506 08/07/23  1936   INR 1.0 1.0 1.3 H         LIPIDS/LFTS:  Recent Labs   Lab 03/15/21  0410 03/17/21  1110 08/11/23  0326 08/12/23  0426 08/13/23  0600 08/14/23  0444 08/15/23  0509   Cholesterol 130  --   --   --   --   --   --    Triglycerides 98  --   --   --   --   --   --    HDL 35 L  --   --   --   --   --   --    LDL Cholesterol 75.4  --   --   --   --   --   --    Non-HDL Cholesterol 95  --   --   --   --   --   --    AST  --    < > 47 H 68 H 78 H 76 H 63 H   ALT  --    < > 48 H 54 H 65 H 64 H 60 H    < > = values in this interval not displayed.         BNP:        TSH:  Recent Labs   Lab 03/14/21  0438   TSH 3.152         Free T4:        Estimated Creatinine Clearance: 51.6 mL/min (A) (based on SCr of 1.6 mg/dL (H)).      Diagnostic Results:  ECG (personally reviewed and interpreted tracing(s)):  8/10/23 1037 , NSSTTW changes  8/11/23 0837 SR 56, QTc 528 (baseline 478)  8/14/23 0904 SR 63, , QTc 515    L MPI 8/11/23 (images prev  personally reviewed and interpreted)    Normal myocardial perfusion scan. There is no evidence of myocardial ischemia or infarction.    The gated perfusion images showed an ejection fraction of 64% post stress.    There is normal wall motion post stress.    KASHIF/DCCV 8/8/23    Left Ventricle: Normal wall motion. There is normal systolic function with a visually estimated ejection fraction of 55 - 60%.    Left Atrium: Left atrium is moderately dilated. There is no thrombus in the left atrial appendage.    Right Ventricle: Right ventricle was not well visualized due to poor acoustic window.    Aortic Valve: There is mild aortic valve sclerosis.    Mitral Valve: There is mild regurgitation.    Tricuspid Valve: There is mild regurgitation.    The pre-cardioversion rhythm was atrial fibrillation. A 200 J synchronized cardioversion was successfully performed with restoration of normal sinus rhythm. The post-cardioversion rhythm was normal sinus rhythm.    Echo: 8/7/23    Left Ventricle: The left ventricle is normal in size. Mildly increased wall thickness. Normal wall motion. There is normal systolic function with a visually estimated ejection fraction of 65 - 70%. Grade I diastolic dysfunction.    Left Atrium: Left atrium is severely dilated.    Right Ventricle: Normal right ventricular cavity size. Systolic function is normal.    Right Atrium: Right atrium is moderately dilated.    Aortic Valve: There is aortic valve sclerosis.    Tricuspid Valve: There is mild regurgitation.    Pulmonary Artery: The estimated pulmonary artery systolic pressure is 23 mmHg.    IVC/SVC: Normal venous pressure at 3 mmHg.        Assessment and Plan:     * ARF (acute renal failure)  Mgmt per IM/neph, creat improving.  Estimated Creatinine Clearance: 51.6 mL/min (A) (based on SCr of 1.6 mg/dL (H)).      Paroxysmal atrial fibrillation  Now back in SR.  Cont DOAC  Priscilla MPI neg.    Cont flecainide 100mg bid and metoprolol  ER.    Heroin abuse  Abstinence recommended    Tobacco abuse  Cessation recommended          VTE Risk Mitigation (From admission, onward)         Ordered     apixaban tablet 5 mg  2 times daily         08/08/23 1634     IP VTE LOW RISK PATIENT  Once         08/07/23 0211     Place sequential compression device  Until discontinued         08/07/23 0211              Dispo planning appropriate.  Pt to follow up with Dr. Claudio.  Cardiology will sign off, pls call with questions.    Win Caceres MD  Cardiology  [facility] - Intensive Care

## 2023-08-15 NOTE — DISCHARGE INSTRUCTIONS
Take all medications as prescribed.  Eat a strict low salt cardiac diet.  Follow up with your physicians as scheduled - pcp within 1 week, cardiology within 2 weeks and nephrology within 2 weeks.  We placed referrals for for cardiology and nephrology, but due to your insurance you may need to get the referrals from your primary care provider.  We also placed a referral for you to get enrolled in outpatient physical therapy.  Thank you for trusting [facility] and Dr. Navarro with your care.  We are honored that you entrusted us with your healthcare needs. Your satisfaction is very important to us and we hope you have been very pleased with your experience at [facility]. After your discharge you may receive a survey asking you to rate your hospital experience. We encourage you to take the time to complete the survey as your feedback allows us to identify areas for improvement as well as recognize our staff.   We hope that you have received the very best care possible during your hospitalization at [facility], as your satisfaction is our top priority.

## 2023-08-15 NOTE — PLAN OF CARE
Patient given all discharge and education paperwork, all questions answered. Outpatient pharmacy delivered prescription. [facility] delivered walker to patient. Telemetry monitoring and PIV removed. Patient transported per wheelchair to personal vehicle by ICU RN with personal belongings and wheelchair.

## 2023-08-15 NOTE — ASSESSMENT & PLAN NOTE
Mgmt per IM/neph, creat improving.  Estimated Creatinine Clearance: 51.6 mL/min (A) (based on SCr of 1.6 mg/dL (H)).

## 2023-08-15 NOTE — PT/OT/SLP RE-EVAL
Occupational Therapy   Re-evaluation    Name: Vivek Whitman  MRN: [id number]  Admitting Diagnosis:  ARF (acute renal failure)  Recent Surgery: Procedure(s) (LRB):  Cardioversion or Defibrillation (N/A)  Cardioversion (N/A) 5 Days Post-Op    Recommendations:     Discharge Recommendations: outpatient OT  Discharge Equipment Recommendations: shower chair  Barriers to discharge:  Decreased caregiver support, Other (Comment) (needs supervision, lives with family)    Assessment:     Vivek Whitman is a 62 y.o. male with a medical diagnosis of ARF (acute renal failure).  He presents with up in chair with no oxygen donned and no c/o pain.  Performance deficits affecting function are weakness, impaired endurance, impaired self care skills, impaired functional mobility, gait instability, impaired balance, impaired cognition, decreased safety awareness, decreased lower extremity function, decreased ROM, impaired coordination.      Rehab Prognosis:  Good; patient would benefit from acute skilled OT services to address these deficits and reach maximum level of function.       Plan:     Patient to be seen 2 x/week to address the above listed problems via self-care/home management, therapeutic activities, therapeutic exercises  Plan of Care Expires: 08/18/23  Plan of Care Reviewed with: patient    Subjective     Chief Complaint: no pain   Patient/Family stated goals: return home with family; agreed to HH, but may only be eligible for outpatient occupational therapy and PT   Communicated with: Tiara MERLOS, prior to session.  Pain/Comfort:  Pain Rating 1: 0/10    Objective:     Communicated with: Tiara MERLOS, prior to session.  Patient found up in chair with: telemetry, peripheral IV upon OT entry to room.    General Precautions: Standard, fall  Orthopedic Precautions: N/A  Braces: N/A  Respiratory Status: Room air    Occupational Performance:    Bed Mobility:    Not tested due to up in chair already      Functional  Mobility/Transfers:  Patient completed Sit <> Stand Transfer with contact guard assistance  with  rolling walker   Patient completed Bed <> Chair Transfer using   technique with contact guard assistance with rolling walker  Functional Mobility:  Pt needed CGA with RW walking, approx. 10', from chair to sink and returning to chair due to unsteady gait and balance.     Activities of Daily Living:  Grooming: modified independence standing at sink due to balance issues, but independent with brushing teeth; needed help applying shower cap to wash hair      Cognitive/Visual Perceptual:  Cognitive/Psychosocial Skills:     -       Oriented to: Person, Place, Time, and Situation   -       Follows Commands/attention:Follows multistep  commands  -       Communication: clear/fluent  -       Memory: No Deficits noted  -       Safety awareness/insight to disability: impaired   -       Mood/Affect/Coping skills/emotional control: Appropriate to situation  Visual/Perceptual:      -Intact no glasses worn     Physical Exam:  Balance:    -       sitting: independent in chair   Postural examination/scapula alignment:    -       Rounded shoulders  -       Forward head  Skin integrity: Bruising of left upper extremity   Edema:  Mild B lower extremity   Sensation:    -       Intact  Motor Planning:    -       intact  Dominant hand:    -       intact   Upper Extremity Range of Motion:     -       Right Upper Extremity: WNL  -       Left Upper Extremity: WNL  Upper Extremity Strength:    -       Right Upper Extremity: WNL  -       Left Upper Extremity: WNL   Strength:    -       Right Upper Extremity: WNL  -       Left Upper Extremity: WNL  Fine Motor Coordination:    -       Intact  Gross motor coordination:   WFL  Neurological:    -       having unsteady balance, no tremors     AMPAC 6 Click:  AMPAC Total Score: 24    Treatment & Education:  Occupational therapy educated patient re:  vs. Outpatient occupational therapy    Occupational therapy recommended shower chair due to balance issues.      Patient left up in chair with all lines intact, call button in reach, and RN  notified    GOALS:   Multidisciplinary Problems       Occupational Therapy Goals          Problem: Occupational Therapy    Goal Priority Disciplines Outcome Interventions   Occupational Therapy Goal     OT, PT/OT Ongoing, Progressing    Description: Goals to be met by: 8/11/23     Patient will increase functional independence with ADLs by performing:    UE Dressing with [place].  LE Dressing with [place].  Grooming while standing at sink with [place].  Toileting from toilet with [place] for hygiene and clothing management.   Sitting at edge of bed x30 minutes with [place].  Toilet transfer to toilet with [place].  Upper extremity exercise program x10 reps per handout, with independence  .                         History:     Past Medical History:   Diagnosis Date    Hypertension          Past Surgical History:   Procedure Laterality Date    CARDIOVERSION N/A 8/8/2023    Procedure: Cardioversion;  Surgeon: Dank Claudio MD;  Location: [facility] CATH LAB;  Service: Cardiology;  Laterality: N/A;    CARDIOVERSION N/A 8/10/2023    Procedure: Cardioversion;  Surgeon: Dank Claudio MD;  Location: [facility] CATH LAB;  Service: Cardiology;  Laterality: N/A;    HERNIA REPAIR      TRANSESOPHAGEAL ECHOCARDIOGRAM WITH POSSIBLE CARDIOVERSION (KASHIF W/ POSS CARDIOVERSION) N/A 8/8/2023    Procedure: Transesophageal echo (KASHIF) intra-procedure log documentation;  Surgeon: Dank Claudio MD;  Location: [facility] CATH LAB;  Service: Cardiology;  Laterality: N/A;    TRANSESOPHAGEAL ECHOCARDIOGRAPHY N/A 8/8/2023    Procedure: ECHOCARDIOGRAM, TRANSESOPHAGEAL;  Surgeon: Dank Claudio MD;  Location: [facility] CATH LAB;  Service: Cardiology;  Laterality: N/A;    TREATMENT OF CARDIAC ARRHYTHMIA N/A 8/10/2023    Procedure: Cardioversion or Defibrillation;  Surgeon: Shanon  Dank TALLEY MD;  Location: [facility] CATH LAB;  Service: Cardiology;  Laterality: N/A;       Time Tracking:     OT Date of Treatment: 08/15/23  OT Start Time: 1046  OT Stop Time: 1111  OT Total Time (min): 25 min    Billable Minutes:Self Care/Home Management 25     8/15/2023

## 2023-08-15 NOTE — NURSING
[facility] [place]  Nurses Note -- 4 Eyes      8/15/2023       Skin assessed on: Q Shift      [x] No Pressure Injuries Present    [x]Prevention Measures Documented    [] Yes LDA  for Pressure Injury Previously documented     [] Yes New Pressure Injury Discovered   [] LDA for New Pressure Injury Added      Attending RN:  Linnea Alanis RN     Second RN:  Luma TALLEY RN

## 2023-08-15 NOTE — PLAN OF CARE
NurseLinnea notified that all CM needs are met once RW received       08/15/23 1418   Final Note   Assessment Type Final Discharge Note   Anticipated Discharge Disposition Home   Hospital Resources/Appts/Education Provided Appointments scheduled and added to AVS;Community resources provided   Post-Acute Status   Post-Acute Authorization Other   Other Status No Post-Acute Service Needs   Discharge Delays None known at this time

## 2023-08-15 NOTE — PLAN OF CARE
Problem: Physical Therapy  Goal: Physical Therapy Goal  Description: Goals to be met by: 23     Patient will increase functional independence with mobility by performin. Sit to stand transfer with Modified [place]  2. Gait  x 150 feet with Modified [place] .   3. Lower extremity exercise program x10 reps per handout, with independence    Outcome: Ongoing, Progressing   PT Re-evaluation performed today.  Goals/POC remain appropriate.  Pt does not qualify for HHPT, only outpatient PT.  RW and close family supervision/assist PRN recommended.

## 2023-08-15 NOTE — PLAN OF CARE
Problem: Occupational Therapy  Goal: Occupational Therapy Goal  Description: Goals to be met by: 8/11/23     Patient will increase functional independence with ADLs by performing:    UE Dressing with [place].  LE Dressing with [place].  Grooming while standing at sink with [place].  Toileting from toilet with [place] for hygiene and clothing management.   Sitting at edge of bed x30 minutes with [place].  Toilet transfer to toilet with [place].  Upper extremity exercise program x10 reps per handout, with independence      Outcome: Ongoing, Progressing   Patient pleasant and more cooperative today with occupational therapy re-evaluation. He would need outpatient occupational therapy also due to unsteadiness with adls because he does not qualify for HHOT. Occupational therapy recommends shower chair to prevent falls due to unsteady balance and gait, but otherwise can return home with family with supervision.

## 2023-08-15 NOTE — SUBJECTIVE & OBJECTIVE
Review of Systems   Gastrointestinal:  Negative for melena.   Genitourinary:  Negative for hematuria.     Objective:     Vital Signs (Most Recent):  Temp: 98.7 °F (37.1 °C) (08/15/23 0305)  Pulse: 65 (08/15/23 0505)  Resp: (!) 50 (08/15/23 0505)  BP: 139/72 (08/15/23 0505)  SpO2: (!) 93 % (08/15/23 0505) Vital Signs (24h Range):  Temp:  [98.7 °F (37.1 °C)-99.2 °F (37.3 °C)] 98.7 °F (37.1 °C)  Pulse:  [56-74] 65  Resp:  [16-50] 50  SpO2:  [88 %-99 %] 93 %  BP: (104-160)/(55-88) 139/72     Weight: 93 kg (205 lb)  Body mass index is 31.17 kg/m².     SpO2: (!) 93 %         Intake/Output Summary (Last 24 hours) at 8/15/2023 0653  Last data filed at 8/15/2023 0617  Gross per 24 hour   Intake 3115.5 ml   Output 2750 ml   Net 365.5 ml         Lines/Drains/Airways       Peripheral Intravenous Line  Duration                  Peripheral IV - Single Lumen 08/09/23 1900 20 G Left;Posterior;Proximal Forearm 5 days                     Exam unchanged vs 8/14/23  Physical Exam  Constitutional:       General: He is not in acute distress.     Appearance: He is well-developed. He is obese. He is not ill-appearing, toxic-appearing or diaphoretic.   HENT:      Head: Normocephalic and atraumatic.   Eyes:      General: No scleral icterus.     Extraocular Movements: Extraocular movements intact.      Conjunctiva/sclera: Conjunctivae normal.      Pupils: Pupils are equal, round, and reactive to light.   Neck:      Thyroid: No thyromegaly.      Vascular: No JVD.      Trachea: No tracheal deviation.   Cardiovascular:      Rate and Rhythm: Normal rate and regular rhythm.      Heart sounds: S1 normal and S2 normal. Murmur heard.      Systolic murmur is present with a grade of 2/6.      No friction rub. No gallop.   Pulmonary:      Effort: Pulmonary effort is normal. No respiratory distress.      Breath sounds: Normal breath sounds. No stridor. No wheezing, rhonchi or rales.   Chest:      Chest wall: No tenderness.   Abdominal:      General:  There is no distension.      Palpations: Abdomen is soft.   Musculoskeletal:         General: No swelling or tenderness. Normal range of motion.      Cervical back: Normal range of motion and neck supple. No rigidity.      Right lower leg: No edema.      Left lower leg: No edema.   Skin:     General: Skin is warm and dry.      Coloration: Skin is not jaundiced.      Findings: No rash.   Neurological:      General: No focal deficit present.      Mental Status: He is alert and oriented to person, place, and time.      Cranial Nerves: No cranial nerve deficit.   Psychiatric:         Mood and Affect: Mood normal.         Behavior: Behavior normal.            Current Medications:   albuterol-ipratropium  3 mL Nebulization Q6H    allopurinoL  100 mg Oral Daily    apixaban  5 mg Oral BID    aspirin  81 mg Oral Daily    atorvastatin  40 mg Oral QHS    doxycycline  100 mg Oral Q12H    famotidine  20 mg Oral Daily    flecainide  100 mg Oral Q12H    magnesium sulfate IVPB  2 g Intravenous Once    metoprolol succinate  25 mg Oral Daily    nicotine  1 patch Transdermal Daily      lactated ringers 100 mL/hr at 08/15/23 0617     aluminum-magnesium hydroxide-simethicone, dextrose 50%, dextrose 50%, glucagon (human recombinant), glucose, glucose, naloxone, ondansetron, sodium chloride 0.9%    Laboratory (all labs reviewed):  CBC:  Recent Labs   Lab 08/11/23  0326 08/12/23  0426 08/13/23  0600 08/14/23  0444 08/15/23  0509   WBC 11.40 9.54 8.28 8.98 9.73   Hemoglobin 12.0 L 13.2 L 12.6 L 12.7 L 12.3 L   Hematocrit 36.0 L 38.8 L 38.1 L 37.8 L 36.3 L   Platelets 195 201 220 199 185         CHEMISTRIES:  Recent Labs   Lab 03/14/21  0438 03/15/21  0410 03/17/21  1110 08/11/23  0326 08/12/23  0426 08/13/23  0600 08/14/23  0444 08/15/23  0509   Glucose 99 81   < > 109 92 93 86 117 H   Sodium 138 137   < > 136 137 138 137 136   Potassium 3.7 4.1   < > 3.5 3.1 L 3.2 L 3.1 L 3.5   BUN 13 18   < > 50 H 47 H 34 H 30 H 25 H   Creatinine 0.8 0.8    < > 3.5 H 2.7 H 2.1 H 1.8 H 1.6 H   eGFR if non  >60 >60  --   --   --   --   --   --    eGFR  --   --    < > 19 A 26 A 35 A 42 A 48 A   Calcium 8.6 L 8.7   < > 8.5 L 8.8 8.7 8.8 8.5 L   Magnesium  --   --    < > 1.9 1.3 L 1.0 L 1.0 L 1.5 L    < > = values in this interval not displayed.         CARDIAC BIOMARKERS:  Recent Labs   Lab 03/13/21  1816 08/07/23  1202   CPK  --  58   Troponin I 0.016  --          COAGS:  Recent Labs   Lab 12/24/20  1236 03/13/21  1506 08/07/23  1936   INR 1.0 1.0 1.3 H         LIPIDS/LFTS:  Recent Labs   Lab 03/15/21  0410 03/17/21  1110 08/11/23  0326 08/12/23  0426 08/13/23  0600 08/14/23  0444 08/15/23  0509   Cholesterol 130  --   --   --   --   --   --    Triglycerides 98  --   --   --   --   --   --    HDL 35 L  --   --   --   --   --   --    LDL Cholesterol 75.4  --   --   --   --   --   --    Non-HDL Cholesterol 95  --   --   --   --   --   --    AST  --    < > 47 H 68 H 78 H 76 H 63 H   ALT  --    < > 48 H 54 H 65 H 64 H 60 H    < > = values in this interval not displayed.         BNP:        TSH:  Recent Labs   Lab 03/14/21  0438   TSH 3.152         Free T4:        Estimated Creatinine Clearance: 51.6 mL/min (A) (based on SCr of 1.6 mg/dL (H)).      Diagnostic Results:  ECG (personally reviewed and interpreted tracing(s)):  8/10/23 1037 , NSSTTW changes  8/11/23 0837 SR 56, QTc 528 (baseline 478)  8/14/23 0904 SR 63, , QTc 515    L MPI 8/11/23 (images prev personally reviewed and interpreted)    Normal myocardial perfusion scan. There is no evidence of myocardial ischemia or infarction.    The gated perfusion images showed an ejection fraction of 64% post stress.    There is normal wall motion post stress.    KASHIF/DCCV 8/8/23    Left Ventricle: Normal wall motion. There is normal systolic function with a visually estimated ejection fraction of 55 - 60%.    Left Atrium: Left atrium is moderately dilated. There is no thrombus in the left atrial  appendage.    Right Ventricle: Right ventricle was not well visualized due to poor acoustic window.    Aortic Valve: There is mild aortic valve sclerosis.    Mitral Valve: There is mild regurgitation.    Tricuspid Valve: There is mild regurgitation.    The pre-cardioversion rhythm was atrial fibrillation. A 200 J synchronized cardioversion was successfully performed with restoration of normal sinus rhythm. The post-cardioversion rhythm was normal sinus rhythm.    Echo: 8/7/23    Left Ventricle: The left ventricle is normal in size. Mildly increased wall thickness. Normal wall motion. There is normal systolic function with a visually estimated ejection fraction of 65 - 70%. Grade I diastolic dysfunction.    Left Atrium: Left atrium is severely dilated.    Right Ventricle: Normal right ventricular cavity size. Systolic function is normal.    Right Atrium: Right atrium is moderately dilated.    Aortic Valve: There is aortic valve sclerosis.    Tricuspid Valve: There is mild regurgitation.    Pulmonary Artery: The estimated pulmonary artery systolic pressure is 23 mmHg.    IVC/SVC: Normal venous pressure at 3 mmHg.

## 2023-08-15 NOTE — PT/OT/SLP PROGRESS
Physical Therapy Treatment    Patient Name:  Vivek Whitman   MRN:  [id number]    Recommendations:     Discharge Recommendations: outpatient PT  Discharge Equipment Recommendations: walker, rolling  Barriers to discharge:  Pt in ICU and not functioning at Independent baseline.  Close supervision/assist PRN recommended    Assessment:     Vivek Whitman is a 62 y.o. male admitted with a medical diagnosis of ARF (acute renal failure).  He presents with the following impairments/functional limitations: weakness, gait instability, impaired endurance, impaired balance, impaired coordination, decreased safety awareness, impaired self care skills, impaired skin, impaired functional mobility, decreased coordination .    Rehab Prognosis: Good; patient would benefit from acute skilled PT services to address these deficits and reach maximum level of function.    Recent Surgery: Procedure(s) (LRB):  Cardioversion or Defibrillation (N/A)  Cardioversion (N/A) 5 Days Post-Op    Plan:     During this hospitalization, patient to be seen  (N/A, pt D/C'd) to address the identified rehab impairments via gait training, therapeutic activities, therapeutic exercises and progress toward the following goals:    Plan of Care Expires:  08/15/23    Subjective     Chief Complaint: No c/o  Patient/Family Comments/goals: Pt agreeable to evaluation and to remain up in chair following  Pain/Comfort:  Pain Rating 1: 0/10  Pain Addressed 1: Pre-medicate for activity, Reposition, Cessation of Activity, Distraction      Objective:     Communicated with nsg prior to session.  Patient found HOB elevated with  (ICU monitoring) upon PT entry to room.     General Precautions: Standard, fall  Orthopedic Precautions: N/A  Braces: N/A  Respiratory Status: Room air     Functional Mobility:  Bed Mobility:     Scooting: contact guard assistance  Supine to Sit: contact guard assistance  Transfers:     Sit to Stand:  contact guard assistance with rolling walker  Gait: CGA  with RA approx 6' with Vc/TC for walker management/safety  Balance: Fair+/Fair      AM-PAC 6 CLICK MOBILITY  Turning over in bed (including adjusting bedclothes, sheets and blankets)?: 3  Sitting down on and standing up from a chair with arms (e.g., wheelchair, bedside commode, etc.): 3  Moving from lying on back to sitting on the side of the bed?: 3  Moving to and from a bed to a chair (including a wheelchair)?: 3  Need to walk in hospital room?: 3  Climbing 3-5 steps with a railing?: 3  Basic Mobility Total Score: 18       Treatment & Education:  Re-evaluation, Nigel protocol: Pt sat at EOB with SBA.  HR 68, SPO2 93%, /77. Educated pt to perform AP's and FAQ's while up in chair, Needs reinforcement    Patient left up in chair with all lines intact, call button in reach, and nsg notified..    GOALS:   Multidisciplinary Problems       Physical Therapy Goals          Problem: Physical Therapy    Goal Priority Disciplines Outcome Goal Variances Interventions   Physical Therapy Goal     PT, PT/OT Ongoing, Progressing     Description: Goals to be met by: 23     Patient will increase functional independence with mobility by performin. Sit to stand transfer with Modified [place]  2. Gait  x 150 feet with Modified [place] .   3. Lower extremity exercise program x10 reps per handout, with independence                         Time Tracking:     PT Received On: 08/15/23  PT Start Time: 1027     PT Stop Time: 1040  PT Total Time (min): 13 min     Billable Minutes: Re-eval 13    Treatment Type: Re-evaluation  PT/PTA: PT     Number of PTA visits since last PT visit: 0     08/15/2023

## 2023-08-20 NOTE — DISCHARGE SUMMARY
[facility] Medicine  Discharge Summary      Patient Name: Vivek Whitman  MRN: [id number]  AZRA: [id number]  Patient Class: IP- Inpatient  Admission Date: 8/6/2023  Hospital Length of Stay: 8 days  Discharge Date and Time: 8/15/2023  2:25 PM  Attending Physician: Racheal att. providers found   Discharging Provider: Pato Navarro MD  Primary Care Provider: Racheal, Primary Doctor    Primary Care Team: Networked reference to record PCT     HPI:    62 y.o. male, with a PMHx of HTN, HLD, tobacco abuse, and hx of heroin abuse/alcohol abuse transferred from [facility] ED for further evaluation. He presented to the ED with cocerns of low blood pressure. Stated for the past week, he had low blood pressure readings with SBP 90-100s and DBP 50s. Noted hypotension associated with feeling fatigue, weak, and make him just want to sleep all day. Stated he has not eat or drink much. However, continued to take his home BP medications. Tried to hydrate himself with gatorade prior to coming to the ED. Denies any fever, chest pain, cough, shortness of breath, abdominal pain, nausea or vomiting. Admits he still smokes 1ppd, smoke for the past 40+ years.     In the ED, pt hypotensive with BP 94/53. Patient given IVF. Labs significant for leukocytosis, elevated Cr of 7.4, and elevated phosphorus. US renal with no evidence for hydronephrosis bilaterally.  CXR with no focal consolidations or edema. Patient admitted to  for further evaluation.       Procedure(s) (LRB):  Cardioversion or Defibrillation (N/A)  Cardioversion (N/A)      Goals of Care Treatment Preferences:  Code Status: Full Code      Consults:   Consults (From admission, onward)        Status Ordering Provider     Inpatient consult to Social Work  Once        Provider:  (Not yet assigned)    Completed PATO NAVARRO     Inpatient consult to Social Work  Once        Provider:  (Not yet assigned)    Completed KATEY ANDERSON     Inpatient consult to Cardiology  Once         Provider:  Dank Claudio MD    Completed [facility]        Hospital Course By Problem:   * ARF (acute renal failure)  -Admitted to inpatient status   -Presented with LAM  -Cr on admit 7.4  -Baseline Cr unknown, but Cr 1.1 in 08/2022  -Suspect due to decreased PO intake and home meds (amolidpine-olmarsartan and chlorthalidone)  -Renal US with no obstruction. Findings consistent with CKD  -Nephrology consulted and input appreciated  -Avoid nephrotoxins, renal dose all meds.   -Treated with IV fluids and creatinine improved to 1.6 at discharge  -Stable for discharge home.  Maintain good oral hydration.  Follow up with pcp within 1 week and nephrology within 2 weeks.      Sepsis  -Met SIRs criteria and was treated empirically for possible sepsis, however no source of infection was identified.  -Suspect initial hypotension was due to volume depletion and NOT sepsis.  -Leukocytosis normalized as did vital signs.     Paroxysmal atrial fibrillation  -Patient with Paroxysmal (<7 days) atrial fibrillation   -Treated with Amio initially but due to pulmonary disease and risk of long term harm this was discontinued  -He underwent DCCV x 2 and now back in NSR  -Continue eliquis, flecainide and metoprolol  -Follow up with cardiology    Class 1 obesity with body mass index (BMI) of 31.0 to 31.9 in adult  Body mass index is 31.17 kg/m². Morbid obesity complicates all aspects of disease management from diagnostic modalities to treatment. Weight loss encouraged and health benefits explained to patient.                Leukocytosis  -WBC# 16.07 on admission w/hypotension. Pt afebrile. CXR with no acute process.   -Pt with leukocytosis, suspect reactive. Low suspicion for infection at this time.   -Hypotension improved with fluids  -Resolved     Hypotension  -BP as low as 63/41 in the ED  -Suspect due to pt continuing home meds (amolidpine-olmarsartan and chlorthalidone) in setting of LAM and decreased PO  intake/dehydration  -Hypotension improved with fluids  -Continue IVF  -Pt with leukocytosis, suspect reactive. Low suspicion for infection at this time.   -Echo ordered and reviewed  -Resolved     Dehydration  -Treated as above      Tobacco abuse  - Patient was counseled regarding smoking for 5 minutes.  - Encourage smoking cessation daily  - NRT offered       History of heroin abuse  -Denies recent use, UDS neg on admission         Final Active Diagnoses:    Diagnosis Date Noted POA    PRINCIPAL PROBLEM:  ARF (acute renal failure) [N17.9] 08/06/2023 Yes    Tobacco abuse [Z72.0] 03/13/2021 Yes    Sepsis [A41.9] 08/08/2023 Yes    Leukocytosis [D72.829] 08/07/2023 Yes    Class 1 obesity with body mass index (BMI) of 31.0 to 31.9 in adult [E66.9, Z68.31] 08/07/2023 Not Applicable    Paroxysmal atrial fibrillation [I48.0] 08/07/2023 Yes    Dehydration [E86.0] 08/06/2023 Yes    Hypotension [I95.9] 08/06/2023 Yes    Heroin abuse [F11.10] 09/23/2016 Yes     Chronic      Problems Resolved During this Admission:       Discharged Condition: stable    Disposition: Home or Self Care    Follow Up:   Follow-up Information     [facility] Follow up on 8/22/2023.    Why: @10am for Hospital Follow up and establish care    PLEASE BRING ID, INSURANCE CARD AND ANY MEDICINES YOU ARE TAKING  Contact information:  [address] [facility] [address]           [facility] Follow up.    Why: Please bring a mask with you    7:30 - 9pm every Tuesday  Contact information:  [address], [place], [address]  [facility]    Substance abuse assistance           [email or web address] Follow up.    Why: To help you find the best treatment for your addition  Contact information:  [phone]           [facility] Follow up.    Why: Call for admittance  Contact information:  [place], [place] · [phone]           [facility] Follow up.    Why: CLOSES AT  "4PM  Contact information:  Addiction treatment center  [place] · [phone]           Equipment, [facility] Follow up.    Specialty: DME Provider  Why: DME for Walker  Contact information:  [address] [place] [id number]  [phone]                       Patient Instructions:      WALKER FOR HOME USE     Order Specific Question Answer Comments   Type of Walker: Adult (5'4"-6'6")    With wheels? Yes    Height: 5' 8" (1.727 m)    Weight: 93 kg (205 lb)    Length of need (1-99 months): 99    Does patient have medical equipment at home? none    Please check all that apply: Patient's condition impairs ambulation.    Please check all that apply: Patient is unable to safely ambulate without equipment.    Please check all that apply: Patient needs help to get in and out of chair.      Ambulatory referral/consult to Physical/Occupational Therapy   Standing Status: Future   Referral Priority: Routine Referral Type: Physical Medicine   Referral Reason: Specialty Services Required   Number of Visits Requested: 1     Ambulatory referral/consult to Cardiology   Standing Status: Future   Referral Priority: Routine Referral Type: Consultation   Referral Reason: Specialty Services Required   Requested Specialty: Cardiology   Number of Visits Requested: 1     Ambulatory referral/consult to Nephrology   Standing Status: Future   Referral Priority: Routine Referral Type: Consultation   Referral Reason: Specialty Services Required   Requested Specialty: Nephrology   Number of Visits Requested: 1     Ambulatory referral/consult to Smoking Cessation Program   Standing Status: Future   Referral Priority: Routine Referral Type: Consultation   Referral Reason: Specialty Services Required   Requested Specialty: CTTS   Number of Visits Requested: 1     Notify your health care provider if you experience any of the following:  increased confusion or weakness     Notify your health care provider if you experience any " "of the following:  persistent dizziness, light-headedness, or visual disturbances     Notify your health care provider if you experience any of the following:  worsening rash     Notify your health care provider if you experience any of the following:  severe persistent headache     Notify your health care provider if you experience any of the following:  difficulty breathing or increased cough     Notify your health care provider if you experience any of the following:  severe uncontrolled pain     Notify your health care provider if you experience any of the following:  persistent nausea and vomiting or diarrhea     Notify your health care provider if you experience any of the following:  temperature >100.4     Activity as tolerated       Significant Diagnostic Studies: Labs:   BMP: No results for input(s): "GLU", "NA", "K", "CL", "CO2", "BUN", "CREATININE", "CALCIUM", "MG" in the last 48 hours., CMP No results for input(s): "NA", "K", "CL", "CO2", "GLU", "BUN", "CREATININE", "CALCIUM", "PROT", "ALBUMIN", "BILITOT", "ALKPHOS", "AST", "ALT", "ANIONGAP", "ESTGFRAFRICA", "EGFRNONAA" in the last 48 hours., CBC No results for input(s): "WBC", "HGB", "HCT", "PLT" in the last 48 hours., INR   Lab Results   Component Value Date    INR 1.3 (H) 08/07/2023    INR 1.0 03/13/2021    INR 1.0 12/24/2020   , Lipid Panel   Lab Results   Component Value Date    CHOL 130 03/15/2021    HDL 35 (L) 03/15/2021    LDLCALC 75.4 03/15/2021    TRIG 98 03/15/2021    CHOLHDL 26.9 03/15/2021   , Troponin No results for input(s): "TROPONINI" in the last 168 hours. and A1C: No results for input(s): "HGBA1C" in the last 4320 hours.    Pending Diagnostic Studies:     Procedure Component Value Units Date/Time    EKG 12-lead [[id number]]     Order Status: Sent Lab Status: No result     EKG 12-lead [[id number]]     Order Status: Sent Lab Status: No result     EKG 12-lead [[id number]]     Order Status: Sent Lab Status: No result     EKG 12-lead " [[id number]]     Order Status: Sent Lab Status: No result     EKG 12-lead [[id number]]     Order Status: Sent Lab Status: No result          Medications:  Reconciled Home Medications:      Medication List      START taking these medications    allopurinoL 100 MG tablet  Commonly known as: ZYLOPRIM  Take 1 tablet (100 mg total) by mouth once daily.     ELIQUIS 5 mg Tab  Generic drug: apixaban  Take 1 tablet (5 mg total) by mouth 2 (two) times daily.     flecainide 100 MG Tab  Commonly known as: TAMBOCOR  Take 1 tablet (100 mg total) by mouth every 12 (twelve) hours.     metoprolol succinate 25 MG 24 hr tablet  Commonly known as: TOPROL-XL  Take 1 tablet (25 mg total) by mouth once daily.        CHANGE how you take these medications    famotidine 20 MG tablet  Commonly known as: PEPCID  Take 1 tablet (20 mg total) by mouth once daily.  What changed: when to take this        CONTINUE taking these medications    aspirin 81 MG Chew  Take 1 tablet (81 mg total) by mouth once daily.     naloxone 1 mg/mL injection  Commonly known as: NARCAN  2 mg (1 mg per nostril) by Nasal route as needed for opioid overdose; may repeat in 3 to 5 minutes if not effective. Call 911     nicotine 7 mg/24 hr  Commonly known as: NICODERM CQ  Place 1 patch onto the skin once daily.     rosuvastatin 20 MG tablet  Commonly known as: CRESTOR  Take 20 mg by mouth once daily.        STOP taking these medications    amlodipine-olmesartan 5-40 mg per tablet  Commonly known as: CHERI     carvediloL 25 MG tablet  Commonly known as: COREG     chlorthalidone 25 MG Tab  Commonly known as: HYGROTEN            Indwelling Lines/Drains at time of discharge:   Lines/Drains/Airways     None                 Time spent on the discharge of patient: 35 minutes    Vignesh Navarro MD  Department of Hospital Medicine  [facility] - Intensive Care

## 2023-08-22 PROBLEM — R94.39 ABNORMAL STRESS TEST: Status: ACTIVE | Noted: 2022-07-05

## 2023-08-22 PROBLEM — R06.00 DYSPNEA: Status: ACTIVE | Noted: 2022-07-05

## 2023-08-30 ENCOUNTER — TELEPHONE (OUTPATIENT)
Dept: HEPATOLOGY | Facility: CLINIC | Age: 63
End: 2023-08-30
Payer: MEDICAID

## 2023-08-30 NOTE — TELEPHONE ENCOUNTER
"Gavino Weber NP ordered that patient be scheduled for a hepatology consult visit for hep c.  Patient hep c quant positive.  I briefly spoke with him and attempted to setup an appt with PA Scheuermann.  When I told him we were based out of main campus, he said, "I can't come over there." He then hung up the phone. Attempt made to reach him again to see if he would have the ability to setup a virtual visit with a provider but he did not  the phone.  This info left on his VM and I sent a letter to his home asking that he call hepatology for scheduling.   "

## 2023-08-30 NOTE — TELEPHONE ENCOUNTER
PLEASE CALL PT AND ADVISE HIM THE HEPATOLOGY CLINIC IS TRYING TO CALL HIM TO SCHEDULE A VIRTUAL VISIT, SINCE HE CANT MAKE IT TO MAIN CAMPUS... THANKS

## 2023-08-30 NOTE — TELEPHONE ENCOUNTER
Called pt notified him to answer his phone to schedule for a virtual visit with Hepatology. Verbally understood.

## 2023-09-26 ENCOUNTER — HOSPITAL ENCOUNTER (INPATIENT)
Facility: HOSPITAL | Age: 63
LOS: 4 days | Discharge: HOME | DRG: 310 | End: 2023-09-30
Attending: EMERGENCY MEDICINE | Admitting: HOSPITALIST
Payer: MEDICARE

## 2023-09-26 DIAGNOSIS — I50.9 CONGESTIVE HEART FAILURE, UNSPECIFIED HF CHRONICITY, UNSPECIFIED HEART FAILURE TYPE: ICD-10-CM

## 2023-09-26 DIAGNOSIS — I48.91 ATRIAL FIBRILLATION: ICD-10-CM

## 2023-09-26 DIAGNOSIS — R06.02 SOB (SHORTNESS OF BREATH): ICD-10-CM

## 2023-09-26 DIAGNOSIS — I48.91 ATRIAL FIBRILLATION WITH RVR: Primary | ICD-10-CM

## 2023-09-26 DIAGNOSIS — I48.0 PAF (PAROXYSMAL ATRIAL FIBRILLATION): ICD-10-CM

## 2023-09-26 DIAGNOSIS — R10.11 RUQ ABDOMINAL PAIN: ICD-10-CM

## 2023-09-26 PROBLEM — I10 BENIGN ESSENTIAL HYPERTENSION: Chronic | Status: ACTIVE | Noted: 2023-09-26

## 2023-09-26 PROBLEM — E86.0 DEHYDRATION: Status: RESOLVED | Noted: 2023-08-06 | Resolved: 2023-09-26

## 2023-09-26 PROBLEM — N17.9 ARF (ACUTE RENAL FAILURE): Status: RESOLVED | Noted: 2023-08-06 | Resolved: 2023-09-26

## 2023-09-26 PROBLEM — I16.0 HYPERTENSIVE URGENCY: Status: RESOLVED | Noted: 2021-03-13 | Resolved: 2023-09-26

## 2023-09-26 LAB
ALBUMIN SERPL BCP-MCNC: 3.3 G/DL (ref 3.5–5.2)
ALP SERPL-CCNC: 56 U/L (ref 55–135)
ALT SERPL W/O P-5'-P-CCNC: 25 U/L (ref 10–44)
AMPHET+METHAMPHET UR QL: NEGATIVE
ANION GAP SERPL CALC-SCNC: 10 MMOL/L (ref 8–16)
AST SERPL-CCNC: 25 U/L (ref 10–40)
BACTERIA #/AREA URNS HPF: ABNORMAL /HPF
BARBITURATES UR QL SCN>200 NG/ML: NEGATIVE
BASOPHILS # BLD AUTO: 0.03 K/UL (ref 0–0.2)
BASOPHILS NFR BLD: 0.2 % (ref 0–1.9)
BENZODIAZ UR QL SCN>200 NG/ML: NEGATIVE
BILIRUB SERPL-MCNC: 0.9 MG/DL (ref 0.1–1)
BILIRUB UR QL STRIP: NEGATIVE
BNP SERPL-MCNC: 420 PG/ML (ref 0–99)
BUN SERPL-MCNC: 17 MG/DL (ref 8–23)
BZE UR QL SCN: NEGATIVE
CALCIUM SERPL-MCNC: 9.4 MG/DL (ref 8.7–10.5)
CANNABINOIDS UR QL SCN: NEGATIVE
CHLORIDE SERPL-SCNC: 101 MMOL/L (ref 95–110)
CLARITY UR: CLEAR
CO2 SERPL-SCNC: 23 MMOL/L (ref 23–29)
COLOR UR: YELLOW
CREAT SERPL-MCNC: 1.1 MG/DL (ref 0.5–1.4)
CREAT UR-MCNC: 231.5 MG/DL (ref 23–375)
CTP QC/QA: YES
DIFFERENTIAL METHOD: ABNORMAL
EOSINOPHIL # BLD AUTO: 0 K/UL (ref 0–0.5)
EOSINOPHIL NFR BLD: 0.2 % (ref 0–8)
ERYTHROCYTE [DISTWIDTH] IN BLOOD BY AUTOMATED COUNT: 17.5 % (ref 11.5–14.5)
EST. GFR  (NO RACE VARIABLE): >60 ML/MIN/1.73 M^2
ETHANOL SERPL-MCNC: <10 MG/DL
GLUCOSE SERPL-MCNC: 164 MG/DL (ref 70–110)
GLUCOSE UR QL STRIP: ABNORMAL
GRAN CASTS #/AREA URNS LPF: 2 /LPF
HCT VFR BLD AUTO: 43.1 % (ref 40–54)
HGB BLD-MCNC: 14.1 G/DL (ref 14–18)
HGB UR QL STRIP: NEGATIVE
HYALINE CASTS #/AREA URNS LPF: 5 /LPF
IMM GRANULOCYTES # BLD AUTO: 0.07 K/UL (ref 0–0.04)
IMM GRANULOCYTES NFR BLD AUTO: 0.5 % (ref 0–0.5)
INR PPP: 1.1 (ref 0.8–1.2)
KETONES UR QL STRIP: NEGATIVE
LACTATE SERPL-SCNC: 1.4 MMOL/L (ref 0.5–2.2)
LEUKOCYTE ESTERASE UR QL STRIP: NEGATIVE
LIPASE SERPL-CCNC: 16 U/L (ref 4–60)
LYMPHOCYTES # BLD AUTO: 1.4 K/UL (ref 1–4.8)
LYMPHOCYTES NFR BLD: 10.7 % (ref 18–48)
MAGNESIUM SERPL-MCNC: 1.4 MG/DL (ref 1.6–2.6)
MCH RBC QN AUTO: 32.5 PG (ref 27–31)
MCHC RBC AUTO-ENTMCNC: 32.7 G/DL (ref 32–36)
MCV RBC AUTO: 99 FL (ref 82–98)
METHADONE UR QL SCN>300 NG/ML: NEGATIVE
MICROSCOPIC COMMENT: ABNORMAL
MONOCYTES # BLD AUTO: 0.8 K/UL (ref 0.3–1)
MONOCYTES NFR BLD: 6.2 % (ref 4–15)
NEUTROPHILS # BLD AUTO: 10.6 K/UL (ref 1.8–7.7)
NEUTROPHILS NFR BLD: 82.2 % (ref 38–73)
NITRITE UR QL STRIP: NEGATIVE
NRBC BLD-RTO: 0 /100 WBC
OPIATES UR QL SCN: NEGATIVE
PCP UR QL SCN>25 NG/ML: NEGATIVE
PH UR STRIP: 7 [PH] (ref 5–8)
PHOSPHATE SERPL-MCNC: 3.5 MG/DL (ref 2.7–4.5)
PLATELET # BLD AUTO: 149 K/UL (ref 150–450)
PMV BLD AUTO: 10.5 FL (ref 9.2–12.9)
POTASSIUM SERPL-SCNC: 4 MMOL/L (ref 3.5–5.1)
PROCALCITONIN SERPL IA-MCNC: 0.32 NG/ML
PROT SERPL-MCNC: 7.3 G/DL (ref 6–8.4)
PROT UR QL STRIP: ABNORMAL
PROTHROMBIN TIME: 11.9 SEC (ref 9–12.5)
RBC # BLD AUTO: 4.34 M/UL (ref 4.6–6.2)
RBC #/AREA URNS HPF: 3 /HPF (ref 0–4)
SARS-COV-2 RDRP RESP QL NAA+PROBE: NEGATIVE
SODIUM SERPL-SCNC: 134 MMOL/L (ref 136–145)
SP GR UR STRIP: 1.02 (ref 1–1.03)
SQUAMOUS #/AREA URNS HPF: 4 /HPF
TOXICOLOGY INFORMATION: NORMAL
TROPONIN I SERPL DL<=0.01 NG/ML-MCNC: 0.01 NG/ML (ref 0–0.03)
TROPONIN I SERPL DL<=0.01 NG/ML-MCNC: 0.02 NG/ML (ref 0–0.03)
URN SPEC COLLECT METH UR: ABNORMAL
UROBILINOGEN UR STRIP-ACNC: ABNORMAL EU/DL
WBC # BLD AUTO: 12.83 K/UL (ref 3.9–12.7)
WBC #/AREA URNS HPF: 4 /HPF (ref 0–5)

## 2023-09-26 PROCEDURE — 20000000 HC ICU ROOM

## 2023-09-26 PROCEDURE — 81000 URINALYSIS NONAUTO W/SCOPE: CPT | Mod: 59 | Performed by: EMERGENCY MEDICINE

## 2023-09-26 PROCEDURE — 25000003 PHARM REV CODE 250: Performed by: EMERGENCY MEDICINE

## 2023-09-26 PROCEDURE — 96375 TX/PRO/DX INJ NEW DRUG ADDON: CPT

## 2023-09-26 PROCEDURE — 83735 ASSAY OF MAGNESIUM: CPT | Performed by: EMERGENCY MEDICINE

## 2023-09-26 PROCEDURE — 94761 N-INVAS EAR/PLS OXIMETRY MLT: CPT

## 2023-09-26 PROCEDURE — 93010 EKG 12-LEAD: ICD-10-PCS | Mod: ,,, | Performed by: INTERNAL MEDICINE

## 2023-09-26 PROCEDURE — 87040 BLOOD CULTURE FOR BACTERIA: CPT | Performed by: EMERGENCY MEDICINE

## 2023-09-26 PROCEDURE — 63600175 PHARM REV CODE 636 W HCPCS: Performed by: EMERGENCY MEDICINE

## 2023-09-26 PROCEDURE — 84145 PROCALCITONIN (PCT): CPT | Performed by: EMERGENCY MEDICINE

## 2023-09-26 PROCEDURE — 85025 COMPLETE CBC W/AUTO DIFF WBC: CPT | Performed by: EMERGENCY MEDICINE

## 2023-09-26 PROCEDURE — 96361 HYDRATE IV INFUSION ADD-ON: CPT

## 2023-09-26 PROCEDURE — 80053 COMPREHEN METABOLIC PANEL: CPT | Performed by: EMERGENCY MEDICINE

## 2023-09-26 PROCEDURE — 82077 ASSAY SPEC XCP UR&BREATH IA: CPT | Performed by: EMERGENCY MEDICINE

## 2023-09-26 PROCEDURE — 84100 ASSAY OF PHOSPHORUS: CPT | Performed by: EMERGENCY MEDICINE

## 2023-09-26 PROCEDURE — 84484 ASSAY OF TROPONIN QUANT: CPT | Performed by: EMERGENCY MEDICINE

## 2023-09-26 PROCEDURE — 93010 ELECTROCARDIOGRAM REPORT: CPT | Mod: ,,, | Performed by: INTERNAL MEDICINE

## 2023-09-26 PROCEDURE — 83880 ASSAY OF NATRIURETIC PEPTIDE: CPT | Performed by: EMERGENCY MEDICINE

## 2023-09-26 PROCEDURE — 99285 EMERGENCY DEPT VISIT HI MDM: CPT | Mod: 25

## 2023-09-26 PROCEDURE — 87635 SARS-COV-2 COVID-19 AMP PRB: CPT | Performed by: EMERGENCY MEDICINE

## 2023-09-26 PROCEDURE — 36415 COLL VENOUS BLD VENIPUNCTURE: CPT | Performed by: HOSPITALIST

## 2023-09-26 PROCEDURE — 93005 ELECTROCARDIOGRAM TRACING: CPT

## 2023-09-26 PROCEDURE — 84484 ASSAY OF TROPONIN QUANT: CPT | Mod: 91 | Performed by: HOSPITALIST

## 2023-09-26 PROCEDURE — 96365 THER/PROPH/DIAG IV INF INIT: CPT

## 2023-09-26 PROCEDURE — 80307 DRUG TEST PRSMV CHEM ANLYZR: CPT | Performed by: EMERGENCY MEDICINE

## 2023-09-26 PROCEDURE — 96376 TX/PRO/DX INJ SAME DRUG ADON: CPT

## 2023-09-26 PROCEDURE — 85610 PROTHROMBIN TIME: CPT | Performed by: EMERGENCY MEDICINE

## 2023-09-26 PROCEDURE — 83605 ASSAY OF LACTIC ACID: CPT | Performed by: EMERGENCY MEDICINE

## 2023-09-26 PROCEDURE — 25000003 PHARM REV CODE 250: Performed by: HOSPITALIST

## 2023-09-26 PROCEDURE — 83690 ASSAY OF LIPASE: CPT | Performed by: EMERGENCY MEDICINE

## 2023-09-26 RX ORDER — ACETAMINOPHEN 500 MG
1000 TABLET ORAL
Status: COMPLETED | OUTPATIENT
Start: 2023-09-26 | End: 2023-09-26

## 2023-09-26 RX ORDER — FUROSEMIDE 10 MG/ML
40 INJECTION INTRAMUSCULAR; INTRAVENOUS
Status: COMPLETED | OUTPATIENT
Start: 2023-09-26 | End: 2023-09-26

## 2023-09-26 RX ORDER — ONDANSETRON 2 MG/ML
8 INJECTION INTRAMUSCULAR; INTRAVENOUS EVERY 6 HOURS PRN
Status: DISCONTINUED | OUTPATIENT
Start: 2023-09-26 | End: 2023-09-30 | Stop reason: HOSPADM

## 2023-09-26 RX ORDER — MAGNESIUM SULFATE HEPTAHYDRATE 40 MG/ML
2 INJECTION, SOLUTION INTRAVENOUS ONCE
Status: COMPLETED | OUTPATIENT
Start: 2023-09-26 | End: 2023-09-26

## 2023-09-26 RX ORDER — DILTIAZEM HYDROCHLORIDE 5 MG/ML
15 INJECTION INTRAVENOUS
Status: COMPLETED | OUTPATIENT
Start: 2023-09-26 | End: 2023-09-26

## 2023-09-26 RX ORDER — ACETAMINOPHEN 325 MG/1
650 TABLET ORAL EVERY 4 HOURS PRN
Status: DISCONTINUED | OUTPATIENT
Start: 2023-09-26 | End: 2023-09-30 | Stop reason: HOSPADM

## 2023-09-26 RX ORDER — NICOTINE 7MG/24HR
1 PATCH, TRANSDERMAL 24 HOURS TRANSDERMAL DAILY
Status: DISCONTINUED | OUTPATIENT
Start: 2023-09-27 | End: 2023-09-30 | Stop reason: HOSPADM

## 2023-09-26 RX ORDER — FLECAINIDE ACETATE 50 MG/1
100 TABLET ORAL EVERY 12 HOURS
Status: DISCONTINUED | OUTPATIENT
Start: 2023-09-26 | End: 2023-09-26

## 2023-09-26 RX ORDER — DILTIAZEM HCL 1 MG/ML
2.5 INJECTION, SOLUTION INTRAVENOUS
Status: COMPLETED | OUTPATIENT
Start: 2023-09-26 | End: 2023-09-26

## 2023-09-26 RX ORDER — OXYCODONE AND ACETAMINOPHEN 5; 325 MG/1; MG/1
1 TABLET ORAL EVERY 4 HOURS PRN
Status: DISCONTINUED | OUTPATIENT
Start: 2023-09-26 | End: 2023-09-30 | Stop reason: HOSPADM

## 2023-09-26 RX ORDER — METOPROLOL SUCCINATE 25 MG/1
25 TABLET, EXTENDED RELEASE ORAL DAILY
Status: DISCONTINUED | OUTPATIENT
Start: 2023-09-27 | End: 2023-09-29

## 2023-09-26 RX ORDER — ALLOPURINOL 100 MG/1
100 TABLET ORAL DAILY
Status: DISCONTINUED | OUTPATIENT
Start: 2023-09-27 | End: 2023-09-30 | Stop reason: HOSPADM

## 2023-09-26 RX ORDER — TALC
6 POWDER (GRAM) TOPICAL NIGHTLY PRN
Status: DISCONTINUED | OUTPATIENT
Start: 2023-09-26 | End: 2023-09-30 | Stop reason: HOSPADM

## 2023-09-26 RX ORDER — HYDROMORPHONE HYDROCHLORIDE 1 MG/ML
0.5 INJECTION, SOLUTION INTRAMUSCULAR; INTRAVENOUS; SUBCUTANEOUS
Status: COMPLETED | OUTPATIENT
Start: 2023-09-26 | End: 2023-09-26

## 2023-09-26 RX ORDER — MORPHINE SULFATE 4 MG/ML
4 INJECTION, SOLUTION INTRAMUSCULAR; INTRAVENOUS EVERY 4 HOURS PRN
Status: DISCONTINUED | OUTPATIENT
Start: 2023-09-26 | End: 2023-09-30 | Stop reason: HOSPADM

## 2023-09-26 RX ORDER — ATORVASTATIN CALCIUM 40 MG/1
40 TABLET, FILM COATED ORAL DAILY
Status: DISCONTINUED | OUTPATIENT
Start: 2023-09-27 | End: 2023-09-30 | Stop reason: HOSPADM

## 2023-09-26 RX ORDER — NAPROXEN SODIUM 220 MG/1
81 TABLET, FILM COATED ORAL DAILY
Status: DISCONTINUED | OUTPATIENT
Start: 2023-09-27 | End: 2023-09-30 | Stop reason: HOSPADM

## 2023-09-26 RX ORDER — FAMOTIDINE 20 MG/1
20 TABLET, FILM COATED ORAL DAILY
Status: DISCONTINUED | OUTPATIENT
Start: 2023-09-27 | End: 2023-09-30 | Stop reason: HOSPADM

## 2023-09-26 RX ORDER — DILTIAZEM HCL 1 MG/ML
0-15 INJECTION, SOLUTION INTRAVENOUS CONTINUOUS
Status: DISCONTINUED | OUTPATIENT
Start: 2023-09-26 | End: 2023-09-27

## 2023-09-26 RX ORDER — POLYETHYLENE GLYCOL 3350 17 G/17G
17 POWDER, FOR SOLUTION ORAL 2 TIMES DAILY PRN
Status: DISCONTINUED | OUTPATIENT
Start: 2023-09-26 | End: 2023-09-30 | Stop reason: HOSPADM

## 2023-09-26 RX ORDER — SODIUM CHLORIDE 0.9 % (FLUSH) 0.9 %
10 SYRINGE (ML) INJECTION
Status: DISCONTINUED | OUTPATIENT
Start: 2023-09-26 | End: 2023-09-30 | Stop reason: HOSPADM

## 2023-09-26 RX ADMIN — SODIUM CHLORIDE 1000 ML: 9 INJECTION, SOLUTION INTRAVENOUS at 01:09

## 2023-09-26 RX ADMIN — ACETAMINOPHEN 1000 MG: 500 TABLET ORAL at 06:09

## 2023-09-26 RX ADMIN — DILTIAZEM HYDROCHLORIDE 15 MG: 5 INJECTION INTRAVENOUS at 04:09

## 2023-09-26 RX ADMIN — MAGNESIUM SULFATE HEPTAHYDRATE 2 G: 40 INJECTION, SOLUTION INTRAVENOUS at 05:09

## 2023-09-26 RX ADMIN — DILTIAZEM HYDROCHLORIDE 15 MG: 5 INJECTION INTRAVENOUS at 01:09

## 2023-09-26 RX ADMIN — DILTIAZEM HYDROCHLORIDE 2.5 MG/HR: 5 INJECTION INTRAVENOUS at 06:09

## 2023-09-26 RX ADMIN — HYDROMORPHONE HYDROCHLORIDE 0.5 MG: 1 INJECTION, SOLUTION INTRAMUSCULAR; INTRAVENOUS; SUBCUTANEOUS at 01:09

## 2023-09-26 RX ADMIN — FUROSEMIDE 40 MG: 10 INJECTION, SOLUTION INTRAVENOUS at 02:09

## 2023-09-26 RX ADMIN — DILTIAZEM HYDROCHLORIDE 2.5 MG/HR: 5 INJECTION INTRAVENOUS at 03:09

## 2023-09-26 RX ADMIN — APIXABAN 5 MG: 5 TABLET, FILM COATED ORAL at 09:09

## 2023-09-26 NOTE — ED NOTES
Vivek Whitman, a 63 y.o. male presents to the ED w/ complaint of RUQ/Flank pain since yesterday evening. PT states he was seen here a few months back, where he was told his kidneys were failing. PT has extensive cardiac HX and recently had surgery for A-Fib. States he takes blood thinners and aspirin and that he is having some SOB. Denies chest pain, headaches or vision changes. Bed is low,locked with call bell in reach. Side rails up x 2 for safety.       Review of patient's allergies indicates:  No Known Allergies  Past Medical History:   Diagnosis Date    Hypertension

## 2023-09-26 NOTE — ASSESSMENT & PLAN NOTE
Main complaint on presentation.  RUQ US showing cholelithiasis/sludge, no secondary findings to suggest acute cholecystitis.  The common duct is upper limits of normal in caliber.  Patient then spiked low grade fever in ER. Does have slight leukocytosis.  Abdominal CT in progress.  Would wait findings before starting any ABX's at this time.

## 2023-09-26 NOTE — ASSESSMENT & PLAN NOTE
Patient with Paroxysmal (<7 days) atrial fibrillation which is uncontrolled currently with Beta Blocker. Patient is currently in atrial fibrillation.[id number] Score: 1. . Anticoagulation indicated. Anticoagulation done with Eliquis.  Noted to be in Afib with RVR on presentation.  Did not respond to Cardizem pushes and placed on Cardizem drip.  Cardiology consult.  Resume home Metoprolol.  Defer to Cards restart home Flecainide.  Continue home Eliquis.

## 2023-09-26 NOTE — ED PROVIDER NOTES
"Encounter Date: 9/26/2023    SCRIBE #1 NOTE: I, Akilasunshine Miner, am scribing for, and in the presence of,  Nathan Vick MD.       History     Chief Complaint   Patient presents with    Abdominal Pain     Pt reports right upper quadrant abdominal pain since last night. Pt denies N/V/D, fever/chills. Pt reports being seen in August and being admitted for LAM "in the ICU for 10 days". Pt denies urinary complaints.     62 yo M with PMHx significant for PAF on eliquis, HTN, polysubstance abuse, recent admission for LAM 8/6/23, stroke,  presents to the ED for abdominal pain. Pt reports acute onset of RUQ abdominal pain, intermittent since last PM. Rates pain 9/10 in severity, worsening when driving over bumps on the road. Also reports chronic dyspnea and BLE swelling but unknown onset. He currently denies any hx of afib. No other exacerbating or alleviating factors. Denies abdominal distention/swelling, nausea, vomiting, diarrhea, constipation, fever, cough, chest pain, or other associated symptoms. He denies any recent consumption of alcohol.     The history is provided by the patient.     Review of patient's allergies indicates:  No Known Allergies  Past Medical History:   Diagnosis Date    Hypertension      Past Surgical History:   Procedure Laterality Date    CARDIOVERSION N/A 8/8/2023    Procedure: Cardioversion;  Surgeon: Dank Claudio MD;  Location: [facility] CATH LAB;  Service: Cardiology;  Laterality: N/A;    CARDIOVERSION N/A 8/10/2023    Procedure: Cardioversion;  Surgeon: Dank Claudio MD;  Location: [facility] CATH LAB;  Service: Cardiology;  Laterality: N/A;    HERNIA REPAIR      TRANSESOPHAGEAL ECHOCARDIOGRAM WITH POSSIBLE CARDIOVERSION (KASHIF W/ POSS CARDIOVERSION) N/A 8/8/2023    Procedure: Transesophageal echo (KASHIF) intra-procedure log documentation;  Surgeon: Dank Claudio MD;  Location: [facility] CATH LAB;  Service: Cardiology;  Laterality: N/A;    TRANSESOPHAGEAL ECHOCARDIOGRAPHY N/A 8/8/2023    " Procedure: ECHOCARDIOGRAM, TRANSESOPHAGEAL;  Surgeon: Dank Claudio MD;  Location: [facility] CATH LAB;  Service: Cardiology;  Laterality: N/A;    TREATMENT OF CARDIAC ARRHYTHMIA N/A 8/10/2023    Procedure: Cardioversion or Defibrillation;  Surgeon: Dank Claudio MD;  Location: [facility] CATH LAB;  Service: Cardiology;  Laterality: N/A;     No family history on file.  Social History     Tobacco Use    Smoking status: Every Day     Current packs/day: 1.00     Types: Cigarettes   Substance Use Topics    Alcohol use: Not Currently     Alcohol/week: 12.0 standard drinks of alcohol     Types: 12 Cans of beer per week    Drug use: No     Review of Systems   Constitutional:  Negative for chills and fever.   HENT:  Negative for congestion, rhinorrhea and sore throat.    Eyes:  Negative for visual disturbance.   Respiratory:  Positive for shortness of breath. Negative for cough.    Cardiovascular:  Positive for leg swelling. Negative for chest pain.   Gastrointestinal:  Positive for abdominal pain. Negative for abdominal distention, constipation, diarrhea, nausea and vomiting.   Genitourinary:  Negative for dysuria, frequency and hematuria.   Musculoskeletal:  Negative for back pain.   Skin:  Negative for rash.   Neurological:  Negative for dizziness, weakness and headaches.       Physical Exam     Initial Vitals [09/26/23 1317]   BP Pulse Resp Temp SpO2   (!) 120/99 (!) 117 18 98.5 °F (36.9 °C) 95 %      MAP       --         Physical Exam    Nursing note and vitals reviewed.  Constitutional: He appears well-developed and well-nourished.   HENT:   Head: Normocephalic and atraumatic.   Eyes: EOM are normal. Pupils are equal, round, and reactive to light.   Neck: Neck supple. No thyromegaly present. No JVD present.   Normal range of motion.  Cardiovascular:  Regular rhythm.     Exam reveals no gallop and no friction rub.       No murmur heard.  Tachycardic with HR 140s    Pulmonary/Chest: Breath sounds normal. No respiratory  distress.   Mild tachypnea.    Abdominal: Abdomen is soft. Bowel sounds are normal. There is no abdominal tenderness.   +distension to abdomen, RUQ with guarding   Musculoskeletal:         General: Edema present. No tenderness. Normal range of motion.      Cervical back: Normal range of motion and neck supple.      Comments: Trace BLE edema     Neurological: He is alert and oriented to person, place, and time. He has normal strength. GCS score is 15. GCS eye subscore is 4. GCS verbal subscore is 5. GCS motor subscore is 6.   Skin: Skin is warm. Capillary refill takes less than 2 seconds.   Psychiatric: He has a normal mood and affect. His behavior is normal. Thought content normal.         ED Course   Critical Care    Date/Time: 9/26/2023 5:41 PM    Performed by: Nathan Vick MD  Authorized by: Nathan Vick MD  Direct patient critical care time: 20 minutes  Additional history critical care time: 5 minutes  Ordering / reviewing critical care time: 10 minutes  Documentation critical care time: 10 minutes  Consulting other physicians critical care time: 10 minutes  Total critical care time (exclusive of procedural time) : 55 minutes  Critical care time was exclusive of teaching time and separately billable procedures and treating other patients.  Critical care was necessary to treat or prevent imminent or life-threatening deterioration of the following conditions: cardiac failure.  Critical care was time spent personally by me on the following activities: development of treatment plan with patient or surrogate, discussions with consultants, evaluation of patient's response to treatment, examination of patient, obtaining history from patient or surrogate, ordering and performing treatments and interventions, ordering and review of laboratory studies, ordering and review of radiographic studies, pulse oximetry, re-evaluation of patient's condition and review of old charts.        Labs Reviewed   CBC W/  AUTO DIFFERENTIAL - Abnormal; Notable for the following components:       Result Value    WBC 12.83 (*)     RBC 4.34 (*)     MCV 99 (*)     MCH 32.5 (*)     RDW 17.5 (*)     Platelets 149 (*)     Gran # (ANC) 10.6 (*)     Immature Grans (Abs) 0.07 (*)     Gran % 82.2 (*)     Lymph % 10.7 (*)     All other components within normal limits   COMPREHENSIVE METABOLIC PANEL - Abnormal; Notable for the following components:    Sodium 134 (*)     Glucose 164 (*)     Albumin 3.3 (*)     All other components within normal limits   URINALYSIS, REFLEX TO URINE CULTURE - Abnormal; Notable for the following components:    Protein, UA 1+ (*)     Glucose, UA Trace (*)     Urobilinogen, UA 2.0-3.0 (*)     All other components within normal limits    Narrative:     Specimen Source->Urine   MAGNESIUM - Abnormal; Notable for the following components:    Magnesium 1.4 (*)     All other components within normal limits   B-TYPE NATRIURETIC PEPTIDE - Abnormal; Notable for the following components:     (*)     All other components within normal limits   URINALYSIS MICROSCOPIC - Abnormal; Notable for the following components:    Bacteria Few (*)     Hyaline Casts, UA 5 (*)     Granular Casts, UA 2 (*)     All other components within normal limits    Narrative:     Specimen Source->Urine   CULTURE, BLOOD   CULTURE, BLOOD   LIPASE   PHOSPHORUS   PROTIME-INR   ALCOHOL,MEDICAL (ETHANOL)   DRUG SCREEN PANEL, URINE EMERGENCY    Narrative:     Specimen Source->Urine   TROPONIN I   LACTIC ACID, PLASMA   PROCALCITONIN   TROPONIN I   SARS-COV-2 RDRP GENE          Imaging Results               CT Abdomen Pelvis  Without Contrast (Final result)  Result time 09/26/23 17:31:20      Final result by Brenda Li MD (09/26/23 17:31:20)                   Impression:      Findings concerning for acute cholecystitis.  Cholelithiasis.    Left renal cyst.    Mild colonic diverticulosis.    2 mm right middle lobe subpleural nodule.  For a solid  nodule <6 mm, Fleischner Society 2017 guidelines recommend no routine follow up for a low risk patient, or follow-up with non-contrast chest CT at 12 months in a high risk patient.    This report was flagged in Epic as abnormal.      Electronically signed by: Brenda Li  Date:    09/26/2023  Time:    17:31               Narrative:    EXAMINATION:  CT ABDOMEN PELVIS WITHOUT    CLINICAL HISTORY:  Right upper quadrant abdominal pain since last night.  Admitted for LAM in the past.  No urinary complaints.    TECHNIQUE:  5 mm unenhanced axial images from the lung bases through the greater trochanters were performed.  Coronal and sagittal reformatted images were provided.    COMPARISON:  None.    FINDINGS:  Within the limits of a noncontrast examination, the liver, spleen, pancreas, and adrenal glands are unremarkable.    There is fat stranding seen in the right upper quadrant.  There is thickening of Gerota's fascia.  There is a 2.0 x 1.7 cm gallstone within the gallbladder.  Mild thickening of the gallbladder wall is present.  In addition, there is mild thickening of the descending portion of the duodenum, which is likely reactive.    The left kidney is more prominent in size on the right.  There is a 3.5 cm left renal exophytic cyst.    There is no gross abdominal adenopathy or ascites.  There is a tiny fat containing umbilical hernia.    Mild colonic diverticulosis is present.  There are no pelvic masses or adenopathy.  There is no free pelvic fluid.  The appendix is not inflamed.  There is a tiny right fat containing inguinal hernia.  There is a moderate left fat containing inguinal hernia.  The uterus is surgically absent.    At the lung bases, there is moderate bibasilar atelectatic change.  There is a 2 mm right middle lobe subpleural nodule.    There is dextroscoliosis.  There is significant multilevel degenerative change of the spine, which is greatest at L1/L2 and L3/L4.  There are remote left-sided rib  fractures.                                       US Abdomen Limited (Final result)  Result time 09/26/23 16:14:05      Final result by Girish Arauz MD (09/26/23 16:14:05)                   Impression:      Cholelithiasis/sludge, no secondary findings to suggest acute cholecystitis.  Please note, the common duct is upper limits of normal in caliber, distal choledocholithiasis cannot be excluded.  Correlation is advised.  Further evaluation with ERCP/MRCP if warranted.    Somewhat heterogeneous appearing hepatic parenchyma, correlation with LFTs advised.      Electronically signed by: Girish Arauz MD  Date:    09/26/2023  Time:    16:14               Narrative:    EXAMINATION:  US ABDOMEN LIMITED    CLINICAL HISTORY:  RUQ abd pain;    TECHNIQUE:  Limited ultrasound of the right upper quadrant of the abdomen (including pancreas, liver, gallbladder, common bile duct, and spleen) was performed.    COMPARISON:  None.    FINDINGS:  The pancreas is for the most part obscured by overlying soft tissue structures.  There is cholelithiasis.  The gallbladder is nondistended.  No significant gallbladder wall thickening.  No gallbladder wall hyperemia.  No pericholecystic fluid.  Sonographic Bassett sign is negative.  The common duct is prominent measuring 7-8 mm.  The hepatic parenchyma is somewhat heterogeneous.  The right kidney is grossly unremarkable.  No ascites.                                       X-Ray Chest 1 View (Final result)  Result time 09/26/23 14:58:33      Final result by Yvan Heredia MD (09/26/23 14:58:33)                   Impression:      Cardiomegaly with pulmonary interstitial edema, suggestive of pulmonary edema secondary to CHF.      Electronically signed by: Yvan Heredia MD  Date:    09/26/2023  Time:    14:58               Narrative:    EXAMINATION:  XR CHEST 1 VIEW    CLINICAL HISTORY:  Shortness of breath    TECHNIQUE:  Single frontal view of the chest was  performed.    COMPARISON:  08/07/2023.    FINDINGS:  Monitoring EKG leads are present.  The trachea is unremarkable.  There are calcifications of the aortic knob.  The cardiomediastinal silhouette is enlarged.  There is no evidence of free air beneath the hemidiaphragms.  There are no pleural effusions.  There is no evidence of a pneumothorax.  There is no evidence of pneumomediastinum.  There is pulmonary interstitial edema.  There is no focal consolidation.  There are degenerative changes in the osseous structures.                                       Medications   magnesium sulfate 2g in water 50mL IVPB (premix) (2 g Intravenous New Bag 9/26/23 4145)   acetaminophen tablet 1,000 mg (has no administration in time range)   sodium chloride 0.9% flush 10 mL (has no administration in time range)   melatonin tablet 6 mg (has no administration in time range)   diltiaZEM 125 mg in D5W 125 mL infusion (has no administration in time range)   allopurinoL tablet 100 mg (has no administration in time range)   apixaban tablet 5 mg (has no administration in time range)   aspirin chewable tablet 81 mg (has no administration in time range)   famotidine tablet 20 mg (has no administration in time range)   metoprolol succinate (TOPROL-XL) 24 hr tablet 25 mg (has no administration in time range)   nicotine 7 mg/24 hr 1 patch (has no administration in time range)   atorvastatin tablet 40 mg (has no administration in time range)   polyethylene glycol packet 17 g (has no administration in time range)   ondansetron injection 8 mg (has no administration in time range)   acetaminophen tablet 650 mg (has no administration in time range)   oxyCODONE-acetaminophen 5-325 mg per tablet 1 tablet (has no administration in time range)   morphine injection 4 mg (has no administration in time range)   sodium chloride 0.9% bolus 1,000 mL 1,000 mL (0 mLs Intravenous Stopped 9/26/23 0348)   diltiaZEM injection 15 mg (15 mg Intravenous Given 9/26/23  1353)   HYDROmorphone injection 0.5 mg (0.5 mg Intravenous Given 9/26/23 1358)   furosemide injection 40 mg (40 mg Intravenous Given 9/26/23 1450)   diltiaZEM injection 15 mg (15 mg Intravenous Given 9/26/23 1601)   diltiaZEM 125 mg in D5W 125 mL infusion (2.5 mg/hr Intravenous New Bag 9/26/23 1554)     Medical Decision Making  Amount and/or Complexity of Data Reviewed  Labs: ordered. Decision-making details documented in ED Course.  Radiology: ordered. Decision-making details documented in ED Course.  ECG/medicine tests: ordered and independent interpretation performed. Decision-making details documented in ED Course.    Risk  OTC drugs.  Prescription drug management.  Decision regarding hospitalization.    Presents with right upper quadrant abdominal pain.  Found to have a small leukocytosis.  LFTs and lipase appear normal and improved from prior lab work.  T bili also normal.  Ultrasound shows no conclusive evidence of acute biliary disease.  There was upper limits of normal common bile duct.  There was no sonographic Bassett's sign.  However patient spiked a temperature of a 100.4°.  No other signs of infection at this time.  COVID, lactic acid, procalcitonin added but pending.  CT abdomen pelvis performed.  Done without contrast due to prior recent acute kidney failure.  Patient's kidney function has stabilized.  CT abdomen pelvis suggests possible acute cholecystitis.  However CT findings this could also be related to evidence of acute CHF exacerbation secondary to atrial fibrillation with rapid ventricular response.  I consulted general surgery.  General surgery recommends no empiric antibiotics at this time.  Placed on Cardizem drip.  Admit to ICU to Dr. Yan.  Consult Cardiology as well.                         I, jacki sullivan, personally performed the services described in this documentation. All medical record entries made by the scribe were at my direction and in my presence. I have reviewed the  chart and agree that the record reflects my personal performance and is accurate and complete.    Clinical Impression:   Final diagnoses:  [R06.02] SOB (shortness of breath)  [I48.91] Atrial fibrillation  [I48.91] Atrial fibrillation with RVR (Primary)  [I50.9] Congestive heart failure, unspecified HF chronicity, unspecified heart failure type  [R10.11] RUQ abdominal pain        ED Disposition Condition    Admit Stable                Nathan Vick MD  09/26/23 4971

## 2023-09-26 NOTE — H&P
"[facility] Medicine  History & Physical    Patient Name: Vivek Whitman  MRN: [id number]  Patient Class: IP- Inpatient  Admission Date: 9/26/2023  Attending Physician: Landon Yan MD   Primary Care Provider: Racheal, Primary Doctor         Patient information was obtained from patient and ER records.     Subjective:     Principal Problem:Paroxysmal atrial fibrillation    Chief Complaint:   Chief Complaint   Patient presents with    Abdominal Pain     Pt reports right upper quadrant abdominal pain since last night. Pt denies N/V/D, fever/chills. Pt reports being seen in August and being admitted for LAM "in the ICU for 10 days". Pt denies urinary complaints.        HPI: 62 y/o M with PMHx significant for PAF on eliquis, HTN, recent diagnosis of Hep C presents to the ER complaining of abdominal pain.  Patinet reports RUQ abdominal pain that started last night.  Pain is intermittent.  Rates pain 9/10 in severity, worsening when driving over bumps on the road.  Also reports chronic dyspnea and BLE swelling.  Hx of ETOH abuse, but denies any recent use.  No alleviating or aggravating factors.  Denies any nausea, vomiting or changes in BM associated with abdominal pain.  He presented to ER where he was noted to be in rapid Afib.  He also spiked a low grade fever in ER.  Denies any other complaints.      Past Medical History:   Diagnosis Date    Hypertension        Past Surgical History:   Procedure Laterality Date    CARDIOVERSION N/A 8/8/2023    Procedure: Cardioversion;  Surgeon: Dank Claudio MD;  Location: [facility] CATH LAB;  Service: Cardiology;  Laterality: N/A;    CARDIOVERSION N/A 8/10/2023    Procedure: Cardioversion;  Surgeon: Dank Claudio MD;  Location: [facility] CATH LAB;  Service: Cardiology;  Laterality: N/A;    HERNIA REPAIR      TRANSESOPHAGEAL ECHOCARDIOGRAM WITH POSSIBLE CARDIOVERSION (KASHIF W/ POSS CARDIOVERSION) N/A 8/8/2023    Procedure: Transesophageal echo (KASHIF) " intra-procedure log documentation;  Surgeon: Dank Claudio MD;  Location: [facility] CATH LAB;  Service: Cardiology;  Laterality: N/A;    TRANSESOPHAGEAL ECHOCARDIOGRAPHY N/A 8/8/2023    Procedure: ECHOCARDIOGRAM, TRANSESOPHAGEAL;  Surgeon: Dank Claudio MD;  Location: [facility] CATH LAB;  Service: Cardiology;  Laterality: N/A;    TREATMENT OF CARDIAC ARRHYTHMIA N/A 8/10/2023    Procedure: Cardioversion or Defibrillation;  Surgeon: Dank Claudio MD;  Location: [facility] CATH LAB;  Service: Cardiology;  Laterality: N/A;       Review of patient's allergies indicates:  No Known Allergies    No current facility-administered medications on file prior to encounter.     Current Outpatient Medications on File Prior to Encounter   Medication Sig    allopurinoL (ZYLOPRIM) 100 MG tablet Take 1 tablet (100 mg total) by mouth once daily.    apixaban (ELIQUIS) 5 mg Tab Take 1 tablet (5 mg total) by mouth 2 (two) times daily.    famotidine (PEPCID) 20 MG tablet Take 1 tablet (20 mg total) by mouth once daily.    flecainide (TAMBOCOR) 100 MG Tab Take 1 tablet (100 mg total) by mouth every 12 (twelve) hours.    metoprolol succinate (TOPROL-XL) 25 MG 24 hr tablet Take 1 tablet (25 mg total) by mouth once daily.    aspirin 81 MG Chew Take 1 tablet (81 mg total) by mouth once daily.    naloxone (NARCAN) 1 mg/mL injection 2 mg (1 mg per nostril) by Nasal route as needed for opioid overdose; may repeat in 3 to 5 minutes if not effective. Call 911    nicotine (NICODERM CQ) 7 mg/24 hr Place 1 patch onto the skin once daily.    rosuvastatin (CRESTOR) 20 MG tablet Take 20 mg by mouth once daily.     Family History    None       Tobacco Use    Smoking status: Every Day     Current packs/day: 1.00     Types: Cigarettes    Smokeless tobacco: Not on file   Substance and Sexual Activity    Alcohol use: Not Currently     Alcohol/week: 12.0 standard drinks of alcohol     Types: 12 Cans of beer per week    Drug use: No    Sexual activity:  Yes     Review of Systems   Constitutional:  Negative for chills and diaphoresis.   HENT:  Negative for ear discharge and ear pain.    Eyes:  Negative for discharge and itching.   Respiratory:  Positive for shortness of breath. Negative for cough.    Cardiovascular:  Positive for leg swelling. Negative for chest pain.   Gastrointestinal:  Positive for abdominal pain. Negative for vomiting.   Endocrine: Negative for cold intolerance and heat intolerance.   Genitourinary:  Negative for difficulty urinating and dysuria.   Musculoskeletal:  Negative for neck pain and neck stiffness.   Skin:  Negative for rash and wound.   Neurological:  Negative for seizures and syncope.   Psychiatric/Behavioral:  Negative for agitation and hallucinations.      Objective:     Vital Signs (Most Recent):  Temp: (!) 100.4 °F (38 °C) (09/26/23 1609)  Pulse: 109 (09/26/23 1603)  Resp: (!) 26 (09/26/23 1602)  BP: (!) 149/63 (09/26/23 1603)  SpO2: (!) 92 % (09/26/23 1603) Vital Signs (24h Range):  Temp:  [98.5 °F (36.9 °C)-100.4 °F (38 °C)] 100.4 °F (38 °C)  Pulse:  [109-134] 109  Resp:  [18-26] 26  SpO2:  [92 %-95 %] 92 %  BP: (120-193)/(63-99) 149/63     Weight: 95.3 kg (210 lb)  Body mass index is 31.93 kg/m².     Physical Exam  Constitutional:       Appearance: He is ill-appearing. He is not diaphoretic.   HENT:      Head: Normocephalic and atraumatic.      Mouth/Throat:      Mouth: Mucous membranes are dry.      Pharynx: No oropharyngeal exudate or posterior oropharyngeal erythema.   Cardiovascular:      Rate and Rhythm: Tachycardia present. Rhythm irregular.   Pulmonary:      Breath sounds: No wheezing.      Comments: Slight tachypnea  Abdominal:      General: Bowel sounds are normal.      Palpations: Abdomen is soft.      Comments: Tender to palpation over RUQ   Musculoskeletal:         General: No deformity or signs of injury.   Skin:     General: Skin is warm and dry.   Neurological:      Mental Status: He is oriented to person,  place, and time.      Cranial Nerves: No cranial nerve deficit.   Psychiatric:         Mood and Affect: Affect is angry.                Significant Labs: All pertinent labs within the past 24 hours have been reviewed.  BMP:   Recent Labs   Lab 09/26/23  1338   *   *   K 4.0      CO2 23   BUN 17   CREATININE 1.1   CALCIUM 9.4   MG 1.4*     CBC:   Recent Labs   Lab 09/26/23  1338   WBC 12.83*   HGB 14.1   HCT 43.1   *       Significant Imaging: I have reviewed all pertinent imaging results/findings within the past 24 hours.    Assessment/Plan:     * Paroxysmal atrial fibrillation  Patient with Paroxysmal (<7 days) atrial fibrillation which is uncontrolled currently with Beta Blocker. Patient is currently in atrial fibrillation.[id number] Score: 1. . Anticoagulation indicated. Anticoagulation done with Eliquis.  Noted to be in Afib with RVR on presentation.  Did not respond to Cardizem pushes and placed on Cardizem drip.  Cardiology consult.  Resume home Metoprolol.  Defer to Cards restart home Flecainide.  Continue home Eliquis.    Benign essential hypertension  Resume home medications with parameters.    Right upper quadrant abdominal pain  Main complaint on presentation.  RUQ US showing cholelithiasis/sludge, no secondary findings to suggest acute cholecystitis.  The common duct is upper limits of normal in caliber.  Patient then spiked low grade fever in ER. Does have slight leukocytosis.  Abdominal CT in progress.  Would wait findings before starting any ABX's at this time.      Class 1 obesity with body mass index (BMI) of 31.0 to 31.9 in adult  Body mass index is 31.93 kg/m². Morbid obesity complicates all aspects of disease management from diagnostic modalities to treatment. Weight loss encouraged and health benefits explained to patient.         Tobacco abuse  Spent more than 3 minutes on smoking cessation counseling.  Provide nicotine patch.        VTE Risk Mitigation (From  admission, onward)         Ordered     apixaban tablet 5 mg  2 times daily         09/26/23 1648     IP VTE HIGH RISK PATIENT  Once         09/26/23 1646     Place sequential compression device  Until discontinued         09/26/23 1646                           Landon Yan MD  Department of Hospital Medicine  [facility] - [place] - Emergency Dept

## 2023-09-26 NOTE — Clinical Note
Diagnosis: Atrial fibrillation with RVR [203116]   Admitting Provider:: CHUCK MI [[id number]]   Future Attending Provider: CHUCK MI [[id number]]   Reason for IP Medical Treatment  (Clinical interventions that can only be accomplished in the IP setting? ) :: AFib RVR on cardizem drip   I certify that Inpatient services for greater than or equal to 2 midnights are medically necessary:: Yes   Plans for Post-Acute care--if anticipated (pick the single best option):: C. Discharge home with home health services

## 2023-09-26 NOTE — HPI
64 y/o M with PMHx significant for PAF on eliquis, HTN, recent diagnosis of Hep C presents to the ER complaining of abdominal pain.  Berna reports RUQ abdominal pain that started last night.  Pain is intermittent.  Rates pain 9/10 in severity, worsening when driving over bumps on the road.  Also reports chronic dyspnea and BLE swelling.  Hx of ETOH abuse, but denies any recent use.  No alleviating or aggravating factors.  Denies any nausea, vomiting or changes in BM associated with abdominal pain.  He presented to ER where he was noted to be in rapid Afib.  He also spiked a low grade fever in ER.  Denies any other complaints.

## 2023-09-26 NOTE — SUBJECTIVE & OBJECTIVE
Past Medical History:   Diagnosis Date    Hypertension        Past Surgical History:   Procedure Laterality Date    CARDIOVERSION N/A 8/8/2023    Procedure: Cardioversion;  Surgeon: Dank Claudio MD;  Location: [facility] CATH LAB;  Service: Cardiology;  Laterality: N/A;    CARDIOVERSION N/A 8/10/2023    Procedure: Cardioversion;  Surgeon: Dank Claudio MD;  Location: [facility] CATH LAB;  Service: Cardiology;  Laterality: N/A;    HERNIA REPAIR      TRANSESOPHAGEAL ECHOCARDIOGRAM WITH POSSIBLE CARDIOVERSION (KASHIF W/ POSS CARDIOVERSION) N/A 8/8/2023    Procedure: Transesophageal echo (KASHIF) intra-procedure log documentation;  Surgeon: Dank Claudio MD;  Location: [facility] CATH LAB;  Service: Cardiology;  Laterality: N/A;    TRANSESOPHAGEAL ECHOCARDIOGRAPHY N/A 8/8/2023    Procedure: ECHOCARDIOGRAM, TRANSESOPHAGEAL;  Surgeon: Dank Claudio MD;  Location: [facility] CATH LAB;  Service: Cardiology;  Laterality: N/A;    TREATMENT OF CARDIAC ARRHYTHMIA N/A 8/10/2023    Procedure: Cardioversion or Defibrillation;  Surgeon: Dank Claudio MD;  Location: [facility] CATH LAB;  Service: Cardiology;  Laterality: N/A;       Review of patient's allergies indicates:  No Known Allergies    No current facility-administered medications on file prior to encounter.     Current Outpatient Medications on File Prior to Encounter   Medication Sig    allopurinoL (ZYLOPRIM) 100 MG tablet Take 1 tablet (100 mg total) by mouth once daily.    apixaban (ELIQUIS) 5 mg Tab Take 1 tablet (5 mg total) by mouth 2 (two) times daily.    famotidine (PEPCID) 20 MG tablet Take 1 tablet (20 mg total) by mouth once daily.    flecainide (TAMBOCOR) 100 MG Tab Take 1 tablet (100 mg total) by mouth every 12 (twelve) hours.    metoprolol succinate (TOPROL-XL) 25 MG 24 hr tablet Take 1 tablet (25 mg total) by mouth once daily.    aspirin 81 MG Chew Take 1 tablet (81 mg total) by mouth once daily.    naloxone (NARCAN) 1 mg/mL injection 2 mg (1 mg per nostril) by Nasal route as  needed for opioid overdose; may repeat in 3 to 5 minutes if not effective. Call 911    nicotine (NICODERM CQ) 7 mg/24 hr Place 1 patch onto the skin once daily.    rosuvastatin (CRESTOR) 20 MG tablet Take 20 mg by mouth once daily.     Family History    None       Tobacco Use    Smoking status: Every Day     Current packs/day: 1.00     Types: Cigarettes    Smokeless tobacco: Not on file   Substance and Sexual Activity    Alcohol use: Not Currently     Alcohol/week: 12.0 standard drinks of alcohol     Types: 12 Cans of beer per week    Drug use: No    Sexual activity: Yes     Review of Systems   Constitutional:  Negative for chills and diaphoresis.   HENT:  Negative for ear discharge and ear pain.    Eyes:  Negative for discharge and itching.   Respiratory:  Positive for shortness of breath. Negative for cough.    Cardiovascular:  Positive for leg swelling. Negative for chest pain.   Gastrointestinal:  Positive for abdominal pain. Negative for vomiting.   Endocrine: Negative for cold intolerance and heat intolerance.   Genitourinary:  Negative for difficulty urinating and dysuria.   Musculoskeletal:  Negative for neck pain and neck stiffness.   Skin:  Negative for rash and wound.   Neurological:  Negative for seizures and syncope.   Psychiatric/Behavioral:  Negative for agitation and hallucinations.      Objective:     Vital Signs (Most Recent):  Temp: (!) 100.4 °F (38 °C) (09/26/23 1609)  Pulse: 109 (09/26/23 1603)  Resp: (!) 26 (09/26/23 1602)  BP: (!) 149/63 (09/26/23 1603)  SpO2: (!) 92 % (09/26/23 1603) Vital Signs (24h Range):  Temp:  [98.5 °F (36.9 °C)-100.4 °F (38 °C)] 100.4 °F (38 °C)  Pulse:  [109-134] 109  Resp:  [18-26] 26  SpO2:  [92 %-95 %] 92 %  BP: (120-193)/(63-99) 149/63     Weight: 95.3 kg (210 lb)  Body mass index is 31.93 kg/m².     Physical Exam  Constitutional:       Appearance: He is ill-appearing. He is not diaphoretic.   HENT:      Head: Normocephalic and atraumatic.      Mouth/Throat:       Mouth: Mucous membranes are dry.      Pharynx: No oropharyngeal exudate or posterior oropharyngeal erythema.   Cardiovascular:      Rate and Rhythm: Tachycardia present. Rhythm irregular.   Pulmonary:      Breath sounds: No wheezing.      Comments: Slight tachypnea  Abdominal:      General: Bowel sounds are normal.      Palpations: Abdomen is soft.      Comments: Tender to palpation over RUQ   Musculoskeletal:         General: No deformity or signs of injury.   Skin:     General: Skin is warm and dry.   Neurological:      Mental Status: He is oriented to person, place, and time.      Cranial Nerves: No cranial nerve deficit.   Psychiatric:         Mood and Affect: Affect is angry.                Significant Labs: All pertinent labs within the past 24 hours have been reviewed.  BMP:   Recent Labs   Lab 09/26/23  1338   *   *   K 4.0      CO2 23   BUN 17   CREATININE 1.1   CALCIUM 9.4   MG 1.4*     CBC:   Recent Labs   Lab 09/26/23  1338   WBC 12.83*   HGB 14.1   HCT 43.1   *       Significant Imaging: I have reviewed all pertinent imaging results/findings within the past 24 hours.

## 2023-09-27 ENCOUNTER — ANESTHESIA EVENT (OUTPATIENT)
Dept: CARDIOLOGY | Facility: HOSPITAL | Age: 63
DRG: 310 | End: 2023-09-27
Payer: MEDICARE

## 2023-09-27 ENCOUNTER — TELEPHONE (OUTPATIENT)
Dept: CARDIOLOGY | Facility: CLINIC | Age: 63
End: 2023-09-27

## 2023-09-27 ENCOUNTER — ANESTHESIA (OUTPATIENT)
Dept: CARDIOLOGY | Facility: HOSPITAL | Age: 63
DRG: 310 | End: 2023-09-27
Payer: MEDICARE

## 2023-09-27 LAB
ALBUMIN SERPL BCP-MCNC: 2.9 G/DL (ref 3.5–5.2)
ALP SERPL-CCNC: 41 U/L (ref 55–135)
ALT SERPL W/O P-5'-P-CCNC: 20 U/L (ref 10–44)
ANION GAP SERPL CALC-SCNC: 8 MMOL/L (ref 8–16)
ASCENDING AORTA: 3.18 CM
AST SERPL-CCNC: 22 U/L (ref 10–40)
AV INDEX (PROSTH): 0.53
AV MEAN GRADIENT: 12 MMHG
AV PEAK GRADIENT: 18 MMHG
AV VALVE AREA BY VELOCITY RATIO: 1.8 CM²
AV VALVE AREA: 1.83 CM²
AV VELOCITY RATIO: 0.52
BASOPHILS # BLD AUTO: 0.04 K/UL (ref 0–0.2)
BASOPHILS NFR BLD: 0.4 % (ref 0–1.9)
BILIRUB SERPL-MCNC: 1.2 MG/DL (ref 0.1–1)
BSA FOR ECHO PROCEDURE: 2.14 M2
BUN SERPL-MCNC: 17 MG/DL (ref 8–23)
CALCIUM SERPL-MCNC: 8.7 MG/DL (ref 8.7–10.5)
CHLORIDE SERPL-SCNC: 104 MMOL/L (ref 95–110)
CO2 SERPL-SCNC: 25 MMOL/L (ref 23–29)
CREAT SERPL-MCNC: 0.9 MG/DL (ref 0.5–1.4)
CV ECHO LV RWT: 0.8 CM
DIFFERENTIAL METHOD: ABNORMAL
DOP CALC AO PEAK VEL: 2.15 M/S
DOP CALC AO VTI: 38.8 CM
DOP CALC LVOT AREA: 3.5 CM2
DOP CALC LVOT DIAMETER: 2.1 CM
DOP CALC LVOT PEAK VEL: 1.12 M/S
DOP CALC LVOT STROKE VOLUME: 70.97 CM3
DOP CALCLVOT PEAK VEL VTI: 20.5 CM
E WAVE DECELERATION TIME: 158.55 MSEC
E/A RATIO: 3.13
E/E' RATIO: 9.4 M/S
ECHO LV POSTERIOR WALL: 1.46 CM (ref 0.6–1.1)
EOSINOPHIL # BLD AUTO: 0.1 K/UL (ref 0–0.5)
EOSINOPHIL NFR BLD: 0.8 % (ref 0–8)
ERYTHROCYTE [DISTWIDTH] IN BLOOD BY AUTOMATED COUNT: 17.4 % (ref 11.5–14.5)
EST. GFR  (NO RACE VARIABLE): >60 ML/MIN/1.73 M^2
FRACTIONAL SHORTENING: 29 % (ref 28–44)
GLUCOSE SERPL-MCNC: 94 MG/DL (ref 70–110)
HCT VFR BLD AUTO: 40.4 % (ref 40–54)
HGB BLD-MCNC: 13.1 G/DL (ref 14–18)
IMM GRANULOCYTES # BLD AUTO: 0.02 K/UL (ref 0–0.04)
IMM GRANULOCYTES NFR BLD AUTO: 0.2 % (ref 0–0.5)
INTERVENTRICULAR SEPTUM: 1.5 CM (ref 0.6–1.1)
IVC DIAMETER: 1.78 CM
IVRT: 87.54 MSEC
LA MAJOR: 6.1 CM
LA MINOR: 4.74 CM
LA WIDTH: 4.7 CM
LEFT ATRIUM SIZE: 4.36 CM
LEFT ATRIUM VOLUME INDEX: 44.5 ML/M2
LEFT ATRIUM VOLUME: 92.92 CM3
LEFT INTERNAL DIMENSION IN SYSTOLE: 2.58 CM (ref 2.1–4)
LEFT VENTRICLE DIASTOLIC VOLUME INDEX: 26.96 ML/M2
LEFT VENTRICLE DIASTOLIC VOLUME: 56.35 ML
LEFT VENTRICLE MASS INDEX: 96 G/M2
LEFT VENTRICLE SYSTOLIC VOLUME INDEX: 11.6 ML/M2
LEFT VENTRICLE SYSTOLIC VOLUME: 24.21 ML
LEFT VENTRICULAR INTERNAL DIMENSION IN DIASTOLE: 3.65 CM (ref 3.5–6)
LEFT VENTRICULAR MASS: 200.43 G
LIPASE SERPL-CCNC: 16 U/L (ref 4–60)
LV LATERAL E/E' RATIO: 7.83 M/S
LV SEPTAL E/E' RATIO: 11.75 M/S
LVOT MG: 3.31 MMHG
LVOT MV: 0.87 CM/S
LYMPHOCYTES # BLD AUTO: 2.2 K/UL (ref 1–4.8)
LYMPHOCYTES NFR BLD: 22.2 % (ref 18–48)
MAGNESIUM SERPL-MCNC: 2 MG/DL (ref 1.6–2.6)
MCH RBC QN AUTO: 32.3 PG (ref 27–31)
MCHC RBC AUTO-ENTMCNC: 32.4 G/DL (ref 32–36)
MCV RBC AUTO: 100 FL (ref 82–98)
MONOCYTES # BLD AUTO: 1.1 K/UL (ref 0.3–1)
MONOCYTES NFR BLD: 11.1 % (ref 4–15)
MV PEAK A VEL: 0.3 M/S
MV PEAK E VEL: 0.94 M/S
MV STENOSIS PRESSURE HALF TIME: 45.98 MS
MV VALVE AREA P 1/2 METHOD: 4.78 CM2
NEUTROPHILS # BLD AUTO: 6.6 K/UL (ref 1.8–7.7)
NEUTROPHILS NFR BLD: 65.3 % (ref 38–73)
NRBC BLD-RTO: 0 /100 WBC
PHOSPHATE SERPL-MCNC: 2.6 MG/DL (ref 2.7–4.5)
PISA TR MAX VEL: 2.71 M/S
PLATELET # BLD AUTO: 120 K/UL (ref 150–450)
PMV BLD AUTO: 10.8 FL (ref 9.2–12.9)
POTASSIUM SERPL-SCNC: 3.6 MMOL/L (ref 3.5–5.1)
PROT SERPL-MCNC: 6.5 G/DL (ref 6–8.4)
PV PEAK GRADIENT: 6 MMHG
PV PEAK VELOCITY: 1.24 M/S
RA MAJOR: 5.98 CM
RA PRESSURE ESTIMATED: 3 MMHG
RA WIDTH: 3.87 CM
RBC # BLD AUTO: 4.05 M/UL (ref 4.6–6.2)
RIGHT VENTRICULAR END-DIASTOLIC DIMENSION: 3.5 CM
RV TB RVSP: 6 MMHG
SINUS: 3.71 CM
SODIUM SERPL-SCNC: 137 MMOL/L (ref 136–145)
STJ: 2.59 CM
TDI LATERAL: 0.12 M/S
TDI SEPTAL: 0.08 M/S
TDI: 0.1 M/S
TR MAX PG: 29 MMHG
TRICUSPID ANNULAR PLANE SYSTOLIC EXCURSION: 1.65 CM
TROPONIN I SERPL DL<=0.01 NG/ML-MCNC: 0.01 NG/ML (ref 0–0.03)
TV PEAK GRADIENT: 1 MMHG
TV REST PULMONARY ARTERY PRESSURE: 32 MMHG
WBC # BLD AUTO: 10.02 K/UL (ref 3.9–12.7)
Z-SCORE OF LEFT VENTRICULAR DIMENSION IN END DIASTOLE: -5.69
Z-SCORE OF LEFT VENTRICULAR DIMENSION IN END SYSTOLE: -3.35

## 2023-09-27 PROCEDURE — 83735 ASSAY OF MAGNESIUM: CPT | Performed by: HOSPITALIST

## 2023-09-27 PROCEDURE — 92960 CARDIOVERSION ELECTRIC EXT: CPT | Performed by: INTERNAL MEDICINE

## 2023-09-27 PROCEDURE — 63600175 PHARM REV CODE 636 W HCPCS: Performed by: STUDENT IN AN ORGANIZED HEALTH CARE EDUCATION/TRAINING PROGRAM

## 2023-09-27 PROCEDURE — 84484 ASSAY OF TROPONIN QUANT: CPT | Performed by: HOSPITALIST

## 2023-09-27 PROCEDURE — 37000008 HC ANESTHESIA 1ST 15 MINUTES: Performed by: INTERNAL MEDICINE

## 2023-09-27 PROCEDURE — 99223 1ST HOSP IP/OBS HIGH 75: CPT | Mod: ,,, | Performed by: SURGERY

## 2023-09-27 PROCEDURE — 85025 COMPLETE CBC W/AUTO DIFF WBC: CPT | Performed by: HOSPITALIST

## 2023-09-27 PROCEDURE — D9220A PRA ANESTHESIA: Mod: ANES,,, | Performed by: ANESTHESIOLOGY

## 2023-09-27 PROCEDURE — 63600175 PHARM REV CODE 636 W HCPCS: Performed by: HOSPITALIST

## 2023-09-27 PROCEDURE — 83690 ASSAY OF LIPASE: CPT | Performed by: HOSPITALIST

## 2023-09-27 PROCEDURE — 25000003 PHARM REV CODE 250: Performed by: INTERNAL MEDICINE

## 2023-09-27 PROCEDURE — D9220A PRA ANESTHESIA: ICD-10-PCS | Mod: ANES,,, | Performed by: ANESTHESIOLOGY

## 2023-09-27 PROCEDURE — 21400001 HC TELEMETRY ROOM

## 2023-09-27 PROCEDURE — D9220A PRA ANESTHESIA: ICD-10-PCS | Mod: CRNA,,, | Performed by: STUDENT IN AN ORGANIZED HEALTH CARE EDUCATION/TRAINING PROGRAM

## 2023-09-27 PROCEDURE — 99291 CRITICAL CARE FIRST HOUR: CPT | Mod: 25,,, | Performed by: INTERNAL MEDICINE

## 2023-09-27 PROCEDURE — 36415 COLL VENOUS BLD VENIPUNCTURE: CPT | Performed by: HOSPITALIST

## 2023-09-27 PROCEDURE — 25000003 PHARM REV CODE 250: Performed by: HOSPITALIST

## 2023-09-27 PROCEDURE — 92960 PR CARDIOVERSION, ELECTIVE;EXTERN: ICD-10-PCS | Mod: ,,, | Performed by: INTERNAL MEDICINE

## 2023-09-27 PROCEDURE — 84100 ASSAY OF PHOSPHORUS: CPT | Performed by: HOSPITALIST

## 2023-09-27 PROCEDURE — 99223 PR INITIAL HOSPITAL CARE,LEVL III: ICD-10-PCS | Mod: ,,, | Performed by: SURGERY

## 2023-09-27 PROCEDURE — 37000009 HC ANESTHESIA EA ADD 15 MINS: Performed by: INTERNAL MEDICINE

## 2023-09-27 PROCEDURE — 99291 PR CRITICAL CARE, E/M 30-74 MINUTES: ICD-10-PCS | Mod: 25,,, | Performed by: INTERNAL MEDICINE

## 2023-09-27 PROCEDURE — 25000003 PHARM REV CODE 250: Performed by: STUDENT IN AN ORGANIZED HEALTH CARE EDUCATION/TRAINING PROGRAM

## 2023-09-27 PROCEDURE — 92960 CARDIOVERSION ELECTRIC EXT: CPT | Mod: ,,, | Performed by: INTERNAL MEDICINE

## 2023-09-27 PROCEDURE — D9220A PRA ANESTHESIA: Mod: CRNA,,, | Performed by: STUDENT IN AN ORGANIZED HEALTH CARE EDUCATION/TRAINING PROGRAM

## 2023-09-27 PROCEDURE — 94761 N-INVAS EAR/PLS OXIMETRY MLT: CPT

## 2023-09-27 PROCEDURE — 80053 COMPREHEN METABOLIC PANEL: CPT | Performed by: HOSPITALIST

## 2023-09-27 RX ORDER — MUPIROCIN 20 MG/G
OINTMENT TOPICAL 2 TIMES DAILY
Status: CANCELLED | OUTPATIENT
Start: 2023-09-27 | End: 2023-10-02

## 2023-09-27 RX ORDER — FLECAINIDE ACETATE 50 MG/1
100 TABLET ORAL EVERY 12 HOURS
Status: DISCONTINUED | OUTPATIENT
Start: 2023-09-27 | End: 2023-09-30 | Stop reason: HOSPADM

## 2023-09-27 RX ORDER — LIDOCAINE HYDROCHLORIDE 20 MG/ML
INJECTION INTRAVENOUS
Status: DISCONTINUED | OUTPATIENT
Start: 2023-09-27 | End: 2023-09-27

## 2023-09-27 RX ORDER — MUPIROCIN 20 MG/G
OINTMENT TOPICAL 2 TIMES DAILY
Status: DISCONTINUED | OUTPATIENT
Start: 2023-09-27 | End: 2023-09-30 | Stop reason: HOSPADM

## 2023-09-27 RX ORDER — ENOXAPARIN SODIUM 100 MG/ML
1 INJECTION SUBCUTANEOUS EVERY 12 HOURS
Status: DISCONTINUED | OUTPATIENT
Start: 2023-09-27 | End: 2023-09-30 | Stop reason: HOSPADM

## 2023-09-27 RX ORDER — PROPOFOL 10 MG/ML
VIAL (ML) INTRAVENOUS
Status: DISCONTINUED | OUTPATIENT
Start: 2023-09-27 | End: 2023-09-27

## 2023-09-27 RX ADMIN — ALLOPURINOL 100 MG: 100 TABLET ORAL at 08:09

## 2023-09-27 RX ADMIN — ENOXAPARIN SODIUM 100 MG: 100 INJECTION SUBCUTANEOUS at 03:09

## 2023-09-27 RX ADMIN — APIXABAN 5 MG: 5 TABLET, FILM COATED ORAL at 08:09

## 2023-09-27 RX ADMIN — FLECAINIDE ACETATE 100 MG: 50 TABLET ORAL at 10:09

## 2023-09-27 RX ADMIN — PIPERACILLIN SODIUM AND TAZOBACTAM SODIUM 4.5 G: 4; .5 INJECTION, POWDER, LYOPHILIZED, FOR SOLUTION INTRAVENOUS at 11:09

## 2023-09-27 RX ADMIN — METOPROLOL SUCCINATE 25 MG: 25 TABLET, EXTENDED RELEASE ORAL at 08:09

## 2023-09-27 RX ADMIN — ATORVASTATIN CALCIUM 40 MG: 40 TABLET, FILM COATED ORAL at 08:09

## 2023-09-27 RX ADMIN — Medication 40 MG: at 11:09

## 2023-09-27 RX ADMIN — PIPERACILLIN SODIUM AND TAZOBACTAM SODIUM 4.5 G: 4; .5 INJECTION, POWDER, LYOPHILIZED, FOR SOLUTION INTRAVENOUS at 03:09

## 2023-09-27 RX ADMIN — FLECAINIDE ACETATE 100 MG: 50 TABLET ORAL at 08:09

## 2023-09-27 RX ADMIN — ASPIRIN 81 MG CHEWABLE TABLET 81 MG: 81 TABLET CHEWABLE at 08:09

## 2023-09-27 RX ADMIN — LIDOCAINE HYDROCHLORIDE 100 MG: 20 INJECTION, SOLUTION INTRAVENOUS at 11:09

## 2023-09-27 RX ADMIN — FAMOTIDINE 20 MG: 20 TABLET ORAL at 08:09

## 2023-09-27 RX ADMIN — Medication 10 MG: at 11:09

## 2023-09-27 RX ADMIN — MUPIROCIN: 20 OINTMENT TOPICAL at 08:09

## 2023-09-27 RX ADMIN — SODIUM CHLORIDE, SODIUM LACTATE, POTASSIUM CHLORIDE, AND CALCIUM CHLORIDE: .6; .31; .03; .02 INJECTION, SOLUTION INTRAVENOUS at 11:09

## 2023-09-27 NOTE — ANESTHESIA PREPROCEDURE EVALUATION
09/27/2023  Vivek Whitman is a 63 y.o., male.      Pre-op Assessment    I have reviewed the Patient Summary Reports.     I have reviewed the Nursing Notes.       Review of Systems  Anesthesia Hx:  No problems with previous Anesthesia  Denies Family Hx of Anesthesia complications.   Denies Personal Hx of Anesthesia complications.   Social:  Smoker, No Alcohol Use H/o alcohol abuse   Cardiovascular:   Hypertension Valvular problems/Murmurs, AS Dysrhythmias atrial fibrillation   9/26/2023 Echo;  Left Ventricle: The left ventricle is normal in size. There is moderate concentric hypertrophy. There is normal systolic function with a visually estimated ejection fraction of 60 - 65%.   Left Atrium: Left atrium is moderately dilated.    Right Ventricle: Normal right ventricular cavity size. Systolic function is normal.    Right Atrium: Right atrium is mildly dilated.    Aortic Valve: There is mild stenosis. Aortic valve area by VTI is 1.83 cm². Aortic valve peak velocity is 2.15 m/s. Mean gradient is 12 mmHg. The dimensionless index is 0.53.    Mitral Valve: There is mild regurgitation.    Tricuspid Valve: There is mild regurgitation.    Pulmonic Valve: There is mild regurgitation.    Pulmonary Artery: The estimated pulmonary artery systolic pressure is 32 mmHg.    IVC/SVC: Normal venous pressure at 3 mmHg.   Pulmonary:   Shortness of breath    Renal/:  Renal/ Normal     Hepatic/GI:  Hepatic/GI Normal    Neurological:   CVA    Endocrine:  Endocrine Normal        Physical Exam  General: Well nourished, Cooperative, Alert and Oriented    Airway:  Mallampati: II   Mouth Opening: Normal  TM Distance: 4 - 6 cm  Tongue: Normal    Dental:6 teeth; none loose per pt  Chest/Lungs:  Clear to auscultation, Normal Respiratory Rate    Heart:  Rate: Normal  Rhythm: Irregularly Irregular      Wt Readings from Last 3  Encounters:   09/26/23 95.7 kg (210 lb 15.7 oz)   09/21/23 96.2 kg (211 lb 15.6 oz)   08/22/23 90.6 kg (199 lb 13.6 oz)     Temp Readings from Last 3 Encounters:   09/27/23 37.2 °C (98.9 °F) (Axillary)   09/21/23 36.9 °C (98.4 °F) (Temporal)   08/22/23 37.5 °C (99.5 °F)     BP Readings from Last 3 Encounters:   09/27/23 (!) 147/67   09/21/23 128/80   08/22/23 132/86     Pulse Readings from Last 3 Encounters:   09/27/23 92   09/21/23 76   08/22/23 73     Lab Results   Component Value Date    WBC 10.02 09/27/2023    HGB 13.1 (L) 09/27/2023    HCT 40.4 09/27/2023     (H) 09/27/2023     (L) 09/27/2023       CMP  Sodium   Date Value Ref Range Status   09/27/2023 137 136 - 145 mmol/L Final     Potassium   Date Value Ref Range Status   09/27/2023 3.6 3.5 - 5.1 mmol/L Final     Chloride   Date Value Ref Range Status   09/27/2023 104 95 - 110 mmol/L Final     CO2   Date Value Ref Range Status   09/27/2023 25 23 - 29 mmol/L Final     Glucose   Date Value Ref Range Status   09/27/2023 94 70 - 110 mg/dL Final     BUN   Date Value Ref Range Status   09/27/2023 17 8 - 23 mg/dL Final     Creatinine   Date Value Ref Range Status   09/27/2023 0.9 0.5 - 1.4 mg/dL Final     Calcium   Date Value Ref Range Status   09/27/2023 8.7 8.7 - 10.5 mg/dL Final     Total Protein   Date Value Ref Range Status   09/27/2023 6.5 6.0 - 8.4 g/dL Final     Albumin   Date Value Ref Range Status   09/27/2023 2.9 (L) 3.5 - 5.2 g/dL Final     Total Bilirubin   Date Value Ref Range Status   09/27/2023 1.2 (H) 0.1 - 1.0 mg/dL Final     Comment:     For infants and newborns, interpretation of results should be based  on gestational age, weight and in agreement with clinical  observations.    Premature Infant recommended reference ranges:  Up to 24 hours.............<8.0 mg/dL  Up to 48 hours............<12.0 mg/dL  3-5 days..................<15.0 mg/dL  6-29 days.................<15.0 mg/dL       Alkaline Phosphatase   Date Value Ref Range  Status   09/27/2023 41 (L) 55 - 135 U/L Final     AST   Date Value Ref Range Status   09/27/2023 22 10 - 40 U/L Final     ALT   Date Value Ref Range Status   09/27/2023 20 10 - 44 U/L Final     Anion Gap   Date Value Ref Range Status   09/27/2023 8 8 - 16 mmol/L Final     eGFR   Date Value Ref Range Status   09/27/2023 >60 >60 mL/min/1.73 m^2 Final   08/22/2023 47 (L) > OR = 60 mL/min/1.73m2 Final         Anesthesia Plan  Type of Anesthesia, risks & benefits discussed:    Anesthesia Type: Gen Natural Airway, MAC, Gen ETT  Intra-op Monitoring Plan: Standard ASA Monitors  Post Op Pain Control Plan: multimodal analgesia  Induction:  IV  Informed Consent: Informed consent signed with the Patient and all parties understand the risks and agree with anesthesia plan.  All questions answered.   ASA Score: 3  Day of Surgery Review of History & Physical: H&P Update referred to the surgeon/provider.    Ready For Surgery From Anesthesia Perspective.     .

## 2023-09-27 NOTE — PROVIDER TRANSFER
Transfer Note    62 y/o male with PAF presented with RUQ abdominal pain.  Noted to be in Afib with RVR and admitted to ICU on Cardizem drip.  Cardiology consulted.  RUQ US just showing gallstones.  Patient with slight leukocytosis and low grade fever in ER.  CT ordered showing possible acute cholecystitis.  Surgery consulted and started on Zosyn.  S/P successful cardioversion.  Continued on B blocker and Flecainide.  Await Surgery input.  Switched Eliquis to Lovenox in case in procedure required.

## 2023-09-27 NOTE — ASSESSMENT & PLAN NOTE
Main complaint on presentation.  RUQ US showing cholelithiasis/sludge, no secondary findings to suggest acute cholecystitis.  The common duct is upper limits of normal in caliber.  Patient then spiked low grade fever in ER. Does have slight leukocytosis.  Abdominal CT concerning for acute cholecystitis.  Started Zosyn and consult Surgery.  Change Eliquis to Lovenox in case any procedure needed.

## 2023-09-27 NOTE — SUBJECTIVE & OBJECTIVE
No current facility-administered medications on file prior to encounter.     Current Outpatient Medications on File Prior to Encounter   Medication Sig    allopurinoL (ZYLOPRIM) 100 MG tablet Take 1 tablet (100 mg total) by mouth once daily.    apixaban (ELIQUIS) 5 mg Tab Take 1 tablet (5 mg total) by mouth 2 (two) times daily.    aspirin 81 MG Chew Take 1 tablet (81 mg total) by mouth once daily.    famotidine (PEPCID) 20 MG tablet Take 1 tablet (20 mg total) by mouth once daily.    flecainide (TAMBOCOR) 100 MG Tab Take 1 tablet (100 mg total) by mouth every 12 (twelve) hours.    metoprolol succinate (TOPROL-XL) 25 MG 24 hr tablet Take 1 tablet (25 mg total) by mouth once daily.    naloxone (NARCAN) 1 mg/mL injection 2 mg (1 mg per nostril) by Nasal route as needed for opioid overdose; may repeat in 3 to 5 minutes if not effective. Call 911    nicotine (NICODERM CQ) 7 mg/24 hr Place 1 patch onto the skin once daily.    rosuvastatin (CRESTOR) 20 MG tablet Take 20 mg by mouth once daily.       Review of patient's allergies indicates:  No Known Allergies    Past Medical History:   Diagnosis Date    Hypertension      Past Surgical History:   Procedure Laterality Date    CARDIOVERSION N/A 8/8/2023    Procedure: Cardioversion;  Surgeon: Dank Claudio MD;  Location: [facility] CATH LAB;  Service: Cardiology;  Laterality: N/A;    CARDIOVERSION N/A 8/10/2023    Procedure: Cardioversion;  Surgeon: Dank Claudio MD;  Location: [facility] CATH LAB;  Service: Cardiology;  Laterality: N/A;    HERNIA REPAIR      TRANSESOPHAGEAL ECHOCARDIOGRAM WITH POSSIBLE CARDIOVERSION (KASHIF W/ POSS CARDIOVERSION) N/A 8/8/2023    Procedure: Transesophageal echo (KASHIF) intra-procedure log documentation;  Surgeon: Dank Claudio MD;  Location: [facility] CATH LAB;  Service: Cardiology;  Laterality: N/A;    TRANSESOPHAGEAL ECHOCARDIOGRAPHY N/A 8/8/2023    Procedure: ECHOCARDIOGRAM, TRANSESOPHAGEAL;  Surgeon: Dank Claudio MD;  Location: [facility] CATH LAB;   Service: Cardiology;  Laterality: N/A;    TREATMENT OF CARDIAC ARRHYTHMIA N/A 8/10/2023    Procedure: Cardioversion or Defibrillation;  Surgeon: Dank Claudio MD;  Location: [facility] CATH LAB;  Service: Cardiology;  Laterality: N/A;     Family History    None       Tobacco Use    Smoking status: Every Day     Current packs/day: 1.00     Types: Cigarettes    Smokeless tobacco: Not on file   Substance and Sexual Activity    Alcohol use: Not Currently     Alcohol/week: 12.0 standard drinks of alcohol     Types: 12 Cans of beer per week    Drug use: No    Sexual activity: Yes     Review of Systems   Constitutional:  Negative for chills and fever.   Respiratory:  Negative for cough and shortness of breath.    Cardiovascular:  Negative for chest pain and palpitations.   Gastrointestinal:  Positive for abdominal pain. Negative for abdominal distention, constipation, diarrhea, nausea and vomiting.     Objective:     Vital Signs (Most Recent):  Temp: 98.4 °F (36.9 °C) (09/27/23 1500)  Pulse: 74 (09/27/23 1500)  Resp: (!) 22 (09/27/23 1500)  BP: (!) 142/74 (09/27/23 1500)  SpO2: 95 % (09/27/23 1500) Vital Signs (24h Range):  Temp:  [97.7 °F (36.5 °C)-100.4 °F (38 °C)] 98.4 °F (36.9 °C)  Pulse:  [] 74  Resp:  [17-42] 22  SpO2:  [88 %-98 %] 95 %  BP: ()/(50-96) 142/74     Weight: 95.7 kg (210 lb 15.7 oz)  Body mass index is 32.08 kg/m².     Physical Exam  Vitals reviewed.   Constitutional:       General: He is not in acute distress.  HENT:      Head: Normocephalic and atraumatic.      Mouth/Throat:      Mouth: Mucous membranes are moist.   Cardiovascular:      Rate and Rhythm: Normal rate.   Pulmonary:      Effort: Pulmonary effort is normal. No respiratory distress.   Abdominal:      General: There is no distension.      Palpations: Abdomen is soft.      Tenderness: There is abdominal tenderness (RUQ). There is no guarding or rebound.   Skin:     General: Skin is warm and dry.   Neurological:      General: No focal  deficit present.      Mental Status: He is alert and oriented to person, place, and time.            I have reviewed all pertinent lab results within the past 24 hours.  CBC:   Recent Labs   Lab 09/27/23  0510   WBC 10.02   RBC 4.05*   HGB 13.1*   HCT 40.4   *   *   MCH 32.3*   MCHC 32.4     CMP:   Recent Labs   Lab 09/27/23  0510   GLU 94   CALCIUM 8.7   ALBUMIN 2.9*   PROT 6.5      K 3.6   CO2 25      BUN 17   CREATININE 0.9   ALKPHOS 41*   ALT 20   AST 22   BILITOT 1.2*       Significant Diagnostics:  I have reviewed all pertinent imaging results/findings within the past 24 hours.

## 2023-09-27 NOTE — NURSING
[facility]  Nurses Note -- 4 Eyes      9/26/2023       Skin assessed on: Q Shift      [x] No Pressure Injuries Present    []Prevention Measures Documented    [] Yes LDA  for Pressure Injury Previously documented     [] Yes New Pressure Injury Discovered   [] LDA for New Pressure Injury Added      Attending RN:  Denisa Delgado RN     Second RN: Dorian Doan RN

## 2023-09-27 NOTE — TRANSFER OF CARE
"Anesthesia Transfer of Care Note    Patient: Vivek Whitman    Procedure(s) Performed: Procedure(s) (LRB):  Cardioversion or Defibrillation (N/A)  Cardioversion (N/A)    Patient location: Other: Recovered by cath lab RN in OR 8.    Anesthesia Type: MAC    Post pain: adequate analgesia    Post assessment: no apparent anesthetic complications and tolerated procedure well    Post vital signs: stable    Level of consciousness: awake and alert    Nausea/Vomiting: no nausea/vomiting    Complications: none    Transfer of care protocol was followed      Last vitals:   Visit Vitals  /70   Pulse (!) 56   Temp 36.5 °C (97.7 °F) (Skin)   Resp (!) 22   Ht 5' 8" (1.727 m)   Wt 95.7 kg (210 lb 15.7 oz)   SpO2 98%   BMI 32.08 kg/m²     " ----- Message from Cindi Wong sent at 12/22/2022  8:32 AM CST -----  Regarding: lab orders  Please enter in lab orders that are needed for next weeks appt.  The only orders in chart are for next September.  Thanks

## 2023-09-27 NOTE — CONSULTS
[facility] - Intensive Care  Cardiology  Consult Note    Patient Name: Vivek Whitman  MRN: [id number]  Admission Date: 9/26/2023  Hospital Length of Stay: 1 days  Code Status: Full Code   Attending Provider: Landon Yan MD   Consulting Provider: Orlando George MD  Primary Care Physician: Racheal, Primary Doctor  Principal Problem:Paroxysmal atrial fibrillation    Patient information was obtained from patient and ER records.     Inpatient consult to Cardiology  Consult performed by: Orlando George MD  Consult ordered by: Landon Yan MD        Subjective:     Chief Complaint:  PAF        HPI: 62 y/o M with PMHx significant for PAF on eliquis, HTN, recent diagnosis of Hep C presents to the ER complaining of abdominal pain.  Patinet reports RUQ abdominal pain that started last night.  Pain is intermittent.  Rates pain 9/10 in severity, worsening when driving over bumps on the road.  Also reports chronic dyspnea and BLE swelling.  Hx of ETOH abuse, but denies any recent use.  No alleviating or aggravating factors.  Denies any nausea, vomiting or changes in BM associated with abdominal pain.  He presented to ER where he was noted to be in rapid Afib.  He also spiked a low grade fever in ER.  Denies any other complaints.    Denies CP or SOB  HR improved with IV diltiazem - still with A-fib  Reports compliance with eliquis and flecainide  Ate small amount of clear broth this AM  EKG A-fib 118 IVCD  Troponin negative x 3    Had KASHIF/CV 8/8/23    Echo 9/27/23    Left Ventricle: The left ventricle is normal in size. There is moderate concentric hypertrophy. There is normal systolic function with a visually estimated ejection fraction of 60 - 65%.    Left Atrium: Left atrium is moderately dilated.    Right Ventricle: Normal right ventricular cavity size. Systolic function is normal.    Right Atrium: Right atrium is mildly dilated.    Aortic Valve: There is mild stenosis. Aortic valve area by VTI is 1.83 cm².  Aortic valve peak velocity is 2.15 m/s. Mean gradient is 12 mmHg. The dimensionless index is 0.53.    Mitral Valve: There is mild regurgitation.    Tricuspid Valve: There is mild regurgitation.    Pulmonic Valve: There is mild regurgitation.    Pulmonary Artery: The estimated pulmonary artery systolic pressure is 32 mmHg.    IVC/SVC: Normal venous pressure at 3 mmHg.      Admitted 8/6/23  Paroxysmal atrial fibrillation  -Patient with Paroxysmal (<7 days) atrial fibrillation   -Treated with Amio initially but due to pulmonary disease and risk of long term harm this was discontinued  -He underwent DCCV x 2 and now back in NSR  -Continue eliquis, flecainide and metoprolol  -Follow up with cardiology    L MPI 8/11/23 (images prev personally reviewed and interpreted)    Normal myocardial perfusion scan. There is no evidence of myocardial ischemia or infarction.    The gated perfusion images showed an ejection fraction of 64% post stress.    There is normal wall motion post stress.     KASHIF/DCCV 8/8/23    Left Ventricle: Normal wall motion. There is normal systolic function with a visually estimated ejection fraction of 55 - 60%.    Left Atrium: Left atrium is moderately dilated. There is no thrombus in the left atrial appendage.    Right Ventricle: Right ventricle was not well visualized due to poor acoustic window.    Aortic Valve: There is mild aortic valve sclerosis.    Mitral Valve: There is mild regurgitation.    Tricuspid Valve: There is mild regurgitation.    The pre-cardioversion rhythm was atrial fibrillation. A 200 J synchronized cardioversion was successfully performed with restoration of normal sinus rhythm. The post-cardioversion rhythm was normal sinus rhythm.     Echo: 8/7/23    Left Ventricle: The left ventricle is normal in size. Mildly increased wall thickness. Normal wall motion. There is normal systolic function with a visually estimated ejection fraction of 65 - 70%. Grade I diastolic  dysfunction.    Left Atrium: Left atrium is severely dilated.    Right Ventricle: Normal right ventricular cavity size. Systolic function is normal.    Right Atrium: Right atrium is moderately dilated.    Aortic Valve: There is aortic valve sclerosis.    Tricuspid Valve: There is mild regurgitation.    Pulmonary Artery: The estimated pulmonary artery systolic pressure is 23 mmHg.    IVC/SVC: Normal venous pressure at 3 mmHg.         Past Medical History:   Diagnosis Date    Hypertension        Past Surgical History:   Procedure Laterality Date    CARDIOVERSION N/A 8/8/2023    Procedure: Cardioversion;  Surgeon: Dank Claudio MD;  Location: [facility] CATH LAB;  Service: Cardiology;  Laterality: N/A;    CARDIOVERSION N/A 8/10/2023    Procedure: Cardioversion;  Surgeon: Dank Claudio MD;  Location: [facility] CATH LAB;  Service: Cardiology;  Laterality: N/A;    HERNIA REPAIR      TRANSESOPHAGEAL ECHOCARDIOGRAM WITH POSSIBLE CARDIOVERSION (KASHIF W/ POSS CARDIOVERSION) N/A 8/8/2023    Procedure: Transesophageal echo (KASHIF) intra-procedure log documentation;  Surgeon: Dank Claudio MD;  Location: [facility] CATH LAB;  Service: Cardiology;  Laterality: N/A;    TRANSESOPHAGEAL ECHOCARDIOGRAPHY N/A 8/8/2023    Procedure: ECHOCARDIOGRAM, TRANSESOPHAGEAL;  Surgeon: Dank Claudio MD;  Location: [facility] CATH LAB;  Service: Cardiology;  Laterality: N/A;    TREATMENT OF CARDIAC ARRHYTHMIA N/A 8/10/2023    Procedure: Cardioversion or Defibrillation;  Surgeon: Dank Claudio MD;  Location: [facility] CATH LAB;  Service: Cardiology;  Laterality: N/A;       Review of patient's allergies indicates:  No Known Allergies    No current facility-administered medications on file prior to encounter.     Current Outpatient Medications on File Prior to Encounter   Medication Sig    allopurinoL (ZYLOPRIM) 100 MG tablet Take 1 tablet (100 mg total) by mouth once daily.    apixaban (ELIQUIS) 5 mg Tab Take 1 tablet (5 mg total) by mouth 2 (two)  times daily.    aspirin 81 MG Chew Take 1 tablet (81 mg total) by mouth once daily.    famotidine (PEPCID) 20 MG tablet Take 1 tablet (20 mg total) by mouth once daily.    flecainide (TAMBOCOR) 100 MG Tab Take 1 tablet (100 mg total) by mouth every 12 (twelve) hours.    metoprolol succinate (TOPROL-XL) 25 MG 24 hr tablet Take 1 tablet (25 mg total) by mouth once daily.    naloxone (NARCAN) 1 mg/mL injection 2 mg (1 mg per nostril) by Nasal route as needed for opioid overdose; may repeat in 3 to 5 minutes if not effective. Call 911    nicotine (NICODERM CQ) 7 mg/24 hr Place 1 patch onto the skin once daily.    rosuvastatin (CRESTOR) 20 MG tablet Take 20 mg by mouth once daily.     Family History    None       Tobacco Use    Smoking status: Every Day     Current packs/day: 1.00     Types: Cigarettes    Smokeless tobacco: Not on file   Substance and Sexual Activity    Alcohol use: Not Currently     Alcohol/week: 12.0 standard drinks of alcohol     Types: 12 Cans of beer per week    Drug use: No    Sexual activity: Yes     Review of Systems   Constitutional: Negative for decreased appetite.   HENT:  Negative for ear discharge.    Eyes:  Negative for blurred vision.   Endocrine: Negative for polyphagia.   Skin:  Negative for nail changes.   Genitourinary:  Negative for bladder incontinence.   Neurological:  Negative for aphonia.   Psychiatric/Behavioral:  Negative for hallucinations.    Allergic/Immunologic: Negative for hives.     Objective:     Vital Signs (Most Recent):  Temp: 98.9 °F (37.2 °C) (09/27/23 0730)  Pulse: 91 (09/27/23 0930)  Resp: (!) 24 (09/27/23 0930)  BP: 121/83 (09/27/23 0900)  SpO2: (!) 93 % (09/27/23 0930) Vital Signs (24h Range):  Temp:  [98.3 °F (36.8 °C)-100.4 °F (38 °C)] 98.9 °F (37.2 °C)  Pulse:  [] 91  Resp:  [17-42] 24  SpO2:  [88 %-98 %] 93 %  BP: ()/(50-99) 121/83     Weight: 95.7 kg (210 lb 15.7 oz)  Body mass index is 32.08 kg/m².    SpO2: (!) 93 %          Intake/Output Summary (Last 24 hours) at 9/27/2023 1004  Last data filed at 9/27/2023 0753  Gross per 24 hour   Intake 84.13 ml   Output 2275 ml   Net -2190.87 ml       Lines/Drains/Airways       Peripheral Intravenous Line  Duration                  Peripheral IV - Single Lumen 09/26/23 1700 20 G Left Antecubital <1 day         Peripheral IV - Single Lumen 09/26/23 1838 20 G Left;Posterior Hand <1 day                     Physical Exam  Constitutional:       Appearance: He is well-developed.   HENT:      Head: Normocephalic and atraumatic.   Eyes:      Conjunctiva/sclera: Conjunctivae normal.      Pupils: Pupils are equal, round, and reactive to light.   Cardiovascular:      Rate and Rhythm: Normal rate. Rhythm irregular.      Pulses: Intact distal pulses.      Heart sounds: Normal heart sounds.   Pulmonary:      Effort: Pulmonary effort is normal.      Breath sounds: Normal breath sounds.   Abdominal:      General: Bowel sounds are normal.      Palpations: Abdomen is soft.   Musculoskeletal:         General: Normal range of motion.      Cervical back: Normal range of motion and neck supple.   Skin:     General: Skin is warm and dry.   Neurological:      Mental Status: He is alert and oriented to person, place, and time.          Significant Labs: All pertinent lab results from the last 24 hours have been reviewed.    Significant Imaging: Echocardiogram: 2D echo with color flow doppler: No results found for this or any previous visit.    Assessment and Plan:     * Paroxysmal atrial fibrillation  Had KASHIF/CV 8/8/23. Back in A-fib RVR - rates improved with IV diltiazem but still with A-fib. Reports compliance with flecainide and eliquis. EF 60-65% on echo.  CV today ate small amount of clear broth this AM. If PAF retunes consider sotalol or multaq    Benign essential hypertension  stable    Right upper quadrant abdominal pain  Per surgery        VTE Risk Mitigation (From admission, onward)         Ordered      apixaban tablet 5 mg  2 times daily         09/26/23 1648     IP VTE HIGH RISK PATIENT  Once         09/26/23 1646     Place sequential compression device  Until discontinued         09/26/23 1646              35 minutes spent with patient in ICU    Thank you for your consult. I will follow-up with patient. Please contact us if you have any additional questions.    Orlando George MD  Cardiology   [facility] - [place] - Intensive Care

## 2023-09-27 NOTE — PLAN OF CARE
Patient off unit at the time CM went to attempt DC needs assessment.  CM to follow up at a later time.

## 2023-09-27 NOTE — ASSESSMENT & PLAN NOTE
Patient with Paroxysmal (<7 days) atrial fibrillation which is uncontrolled currently with Beta Blocker. Patient is currently in atrial fibrillation.[id number] Score: 1. . Anticoagulation indicated. Anticoagulation done with Eliquis.  Noted to be in Afib with RVR on presentation.  Did not respond to Cardizem pushes and placed on Cardizem drip.  Cardiology consulted  Resume home Metoprolol and Flecainide.  On Eliquis.  S/P successful cardioversion today.

## 2023-09-27 NOTE — HOSPITAL COURSE
62 y/o male with PAF presented with RUQ abdominal pain.  Noted to be in Afib with RVR and admitted to ICU on Cardizem drip.  Cardiology consulted. S/P DVCCV, converted to sinus.  RUQ US just showing gallstones.  Patient with slight leukocytosis and low grade fever in ER.  CT ordered showing possible acute cholecystitis.  Surgery consulted.surgery want patient be continued  monitor while is on IV Abx,surgery not convinced is really cholecystitis,patients pain is improving,tolerating diet.  Has staph on blood culture,may contaminated,blood culture is repeated,on Vanc.  BP is uncontrolled,added more BP med's  with prn clonidine.  Pain resolved. Will send home on 7 days of Augmentin. Follow up with surgery in one week. Activity as tolerated- refuses PT/OT eval. Low NA diet

## 2023-09-27 NOTE — SUBJECTIVE & OBJECTIVE
Interval History: feeling better, but still having RUQ abdominal pain.    Review of Systems   HENT:  Negative for ear discharge and ear pain.    Eyes:  Negative for discharge and itching.   Endocrine: Negative for cold intolerance and heat intolerance.   Neurological:  Negative for seizures and syncope.     Objective:     Vital Signs (Most Recent):  Temp: 97.7 °F (36.5 °C) (09/27/23 1126)  Pulse: 72 (09/27/23 1400)  Resp: (!) 25 (09/27/23 1400)  BP: (!) 146/70 (09/27/23 1400)  SpO2: (!) 94 % (09/27/23 1400) Vital Signs (24h Range):  Temp:  [97.7 °F (36.5 °C)-100.4 °F (38 °C)] 97.7 °F (36.5 °C)  Pulse:  [] 72  Resp:  [17-42] 25  SpO2:  [88 %-98 %] 94 %  BP: ()/(50-96) 146/70     Weight: 95.7 kg (210 lb 15.7 oz)  Body mass index is 32.08 kg/m².    Intake/Output Summary (Last 24 hours) at 9/27/2023 1419  Last data filed at 9/27/2023 1125  Gross per 24 hour   Intake 184.13 ml   Output 2275 ml   Net -2090.87 ml         Physical Exam  Constitutional:       Appearance: He is not toxic-appearing or diaphoretic.   HENT:      Head: Normocephalic and atraumatic.      Mouth/Throat:      Mouth: Mucous membranes are dry.      Pharynx: No oropharyngeal exudate or posterior oropharyngeal erythema.   Cardiovascular:      Rate and Rhythm: Normal rate and regular rhythm.   Pulmonary:      Effort: Pulmonary effort is normal.      Breath sounds: No wheezing.   Abdominal:      General: Bowel sounds are normal.      Palpations: Abdomen is soft.      Comments: Tender to palpation over RUQ   Musculoskeletal:         General: No deformity or signs of injury.   Skin:     General: Skin is warm and dry.   Neurological:      Mental Status: He is oriented to person, place, and time.      Cranial Nerves: No cranial nerve deficit.             Significant Labs: All pertinent labs within the past 24 hours have been reviewed.  BMP:   Recent Labs   Lab 09/27/23  0510   GLU 94      K 3.6      CO2 25   BUN 17   CREATININE 0.9    CALCIUM 8.7   MG 2.0     CBC:   Recent Labs   Lab 09/26/23  1338 09/27/23  0510   WBC 12.83* 10.02   HGB 14.1 13.1*   HCT 43.1 40.4   * 120*       Significant Imaging: I have reviewed all pertinent imaging results/findings within the past 24 hours.

## 2023-09-27 NOTE — NURSING
Patient lying in bed with no complaint and no distress noted.See flowsheet for further assessment.

## 2023-09-27 NOTE — PROGRESS NOTES
[facility] Medicine  Progress Note    Patient Name: Vivek Whitman  MRN: [id number]  Patient Class: IP- Inpatient   Admission Date: 9/26/2023  Length of Stay: 1 days  Attending Physician: Landon Yan MD  Primary Care Provider: Racheal, Primary Doctor        Subjective:     Principal Problem:Paroxysmal atrial fibrillation        HPI:  62 y/o M with PMHx significant for PAF on eliquis, HTN, recent diagnosis of Hep C presents to the ER complaining of abdominal pain.  Patinet reports RUQ abdominal pain that started last night.  Pain is intermittent.  Rates pain 9/10 in severity, worsening when driving over bumps on the road.  Also reports chronic dyspnea and BLE swelling.  Hx of ETOH abuse, but denies any recent use.  No alleviating or aggravating factors.  Denies any nausea, vomiting or changes in BM associated with abdominal pain.  He presented to ER where he was noted to be in rapid Afib.  He also spiked a low grade fever in ER.  Denies any other complaints.      Overview/Hospital Course:  62 y/o male with PAF presented with RUQ abdominal pain.  Noted to be in Afib with RVR and admitted to ICU on Cardizem drip.  Cardiology consulted.  RUQ US just showing gallstones.  Patient with slight leukocytosis and low grade fever in ER.  CT ordered showing possible acute cholecystitis.  Surgery consulted.      Interval History: feeling better, but still having RUQ abdominal pain.    Review of Systems   HENT:  Negative for ear discharge and ear pain.    Eyes:  Negative for discharge and itching.   Endocrine: Negative for cold intolerance and heat intolerance.   Neurological:  Negative for seizures and syncope.     Objective:     Vital Signs (Most Recent):  Temp: 97.7 °F (36.5 °C) (09/27/23 1126)  Pulse: 72 (09/27/23 1400)  Resp: (!) 25 (09/27/23 1400)  BP: (!) 146/70 (09/27/23 1400)  SpO2: (!) 94 % (09/27/23 1400) Vital Signs (24h Range):  Temp:  [97.7 °F (36.5 °C)-100.4 °F (38 °C)] 97.7 °F (36.5  °C)  Pulse:  [] 72  Resp:  [17-42] 25  SpO2:  [88 %-98 %] 94 %  BP: ()/(50-96) 146/70     Weight: 95.7 kg (210 lb 15.7 oz)  Body mass index is 32.08 kg/m².    Intake/Output Summary (Last 24 hours) at 9/27/2023 1419  Last data filed at 9/27/2023 1125  Gross per 24 hour   Intake 184.13 ml   Output 2275 ml   Net -2090.87 ml         Physical Exam  Constitutional:       Appearance: He is not toxic-appearing or diaphoretic.   HENT:      Head: Normocephalic and atraumatic.      Mouth/Throat:      Mouth: Mucous membranes are dry.      Pharynx: No oropharyngeal exudate or posterior oropharyngeal erythema.   Cardiovascular:      Rate and Rhythm: Normal rate and regular rhythm.   Pulmonary:      Effort: Pulmonary effort is normal.      Breath sounds: No wheezing.   Abdominal:      General: Bowel sounds are normal.      Palpations: Abdomen is soft.      Comments: Tender to palpation over RUQ   Musculoskeletal:         General: No deformity or signs of injury.   Skin:     General: Skin is warm and dry.   Neurological:      Mental Status: He is oriented to person, place, and time.      Cranial Nerves: No cranial nerve deficit.             Significant Labs: All pertinent labs within the past 24 hours have been reviewed.  BMP:   Recent Labs   Lab 09/27/23  0510   GLU 94      K 3.6      CO2 25   BUN 17   CREATININE 0.9   CALCIUM 8.7   MG 2.0     CBC:   Recent Labs   Lab 09/26/23  1338 09/27/23  0510   WBC 12.83* 10.02   HGB 14.1 13.1*   HCT 43.1 40.4   * 120*       Significant Imaging: I have reviewed all pertinent imaging results/findings within the past 24 hours.      Assessment/Plan:      * Paroxysmal atrial fibrillation  Patient with Paroxysmal (<7 days) atrial fibrillation which is uncontrolled currently with Beta Blocker. Patient is currently in atrial fibrillation.[id number] Score: 1. . Anticoagulation indicated. Anticoagulation done with Eliquis.  Noted to be in Afib with RVR on  presentation.  Did not respond to Cardizem pushes and placed on Cardizem drip.  Cardiology consulted  Resume home Metoprolol and Flecainide.  On Eliquis.  S/P successful cardioversion today.      Benign essential hypertension  Resume home medications with parameters.    Right upper quadrant abdominal pain  Main complaint on presentation.  RUQ US showing cholelithiasis/sludge, no secondary findings to suggest acute cholecystitis.  The common duct is upper limits of normal in caliber.  Patient then spiked low grade fever in ER. Does have slight leukocytosis.  Abdominal CT concerning for acute cholecystitis.  Started Zosyn and consult Surgery.  Change Eliquis to Lovenox in case any procedure needed.      Class 1 obesity with body mass index (BMI) of 31.0 to 31.9 in adult  Body mass index is 32.08 kg/m². Morbid obesity complicates all aspects of disease management from diagnostic modalities to treatment. Weight loss encouraged and health benefits explained to patient.         Tobacco abuse  Spent more than 3 minutes on smoking cessation counseling.  Provide nicotine patch.        VTE Risk Mitigation (From admission, onward)         Ordered     enoxaparin injection 100 mg  Every 12 hours         09/27/23 1422     IP VTE HIGH RISK PATIENT  Once         09/26/23 1646     Place sequential compression device  Until discontinued         09/26/23 1646                Discharge Planning   ELODIA:      Code Status: Full Code   Is the patient medically ready for discharge?:     Reason for patient still in hospital (select all that apply): Patient trending condition and Laboratory test             Landon Yan MD  Department of Hospital Medicine   [facility] - Intensive Care

## 2023-09-27 NOTE — PROCEDURES
"Vivek Whitman is a 63 y.o. male patient.    Temp: 97.7 °F (36.5 °C) (09/27/23 1126)  Pulse: (!) 56 (09/27/23 1126)  Resp: (!) 22 (09/27/23 1126)  BP: 124/70 (09/27/23 1126)  SpO2: 98 % (09/27/23 1126)  Weight: 95.7 kg (210 lb 15.7 oz) (09/26/23 1829)  Height: 5' 8" (172.7 cm) (09/26/23 1829)       Procedures    CV   Dr George  Pre-op Dx A-fib  Post-op Dx same  Specimen none  EBL < 50 cc    9/27/23 CV - 200J shock converted A-fib to sinus bradycardia 55    Stop diltiazem  Continue metoprolol and flecainide  Will f/u prn    9/27/2023    "

## 2023-09-27 NOTE — ASSESSMENT & PLAN NOTE
63 year old male with PMH of  PAF on eliquis, HTN, recent diagnosis of Hep C admitted for RUQ pain and a-fib with RVR requiring cardioversion.     Pain is signficantly improved today with a resolution in his white count without antiboitics. Will follow along to ensure pain resolves. If pain worsens or has new leukocytosis would recommend a HIDA scan. At this point in time, he is a fairly high risk candidate with atrial fibrillation requiring cardioversion and ongoing anticoagulation.

## 2023-09-27 NOTE — NURSING
Pr returned from cath lab s/p cardioversion. HR sinus with rate of 64. EKG done prior to arrival. BP WNL. AAOx4.

## 2023-09-27 NOTE — HPI
HPI: 62 y/o M with PMHx significant for PAF on eliquis, HTN, recent diagnosis of Hep C presents to the ER complaining of abdominal pain.  Berna reports RUQ abdominal pain that started last night.  Pain is intermittent.  Rates pain 9/10 in severity, worsening when driving over bumps on the road.  Also reports chronic dyspnea and BLE swelling.  Hx of ETOH abuse, but denies any recent use.  No alleviating or aggravating factors.  Denies any nausea, vomiting or changes in BM associated with abdominal pain.  He presented to ER where he was noted to be in rapid Afib.  He also spiked a low grade fever in ER.  Denies any other complaints.    Denies CP or SOB  HR improved with IV diltiazem - still with A-fib  Reports compliance with eliquis and flecainide  Ate small amount of clear broth this AM  EKG A-fib 118 IVCD  Troponin negative x 3    Had KASHIF/CV 8/8/23    Echo 9/27/23    Left Ventricle: The left ventricle is normal in size. There is moderate concentric hypertrophy. There is normal systolic function with a visually estimated ejection fraction of 60 - 65%.    Left Atrium: Left atrium is moderately dilated.    Right Ventricle: Normal right ventricular cavity size. Systolic function is normal.    Right Atrium: Right atrium is mildly dilated.    Aortic Valve: There is mild stenosis. Aortic valve area by VTI is 1.83 cm². Aortic valve peak velocity is 2.15 m/s. Mean gradient is 12 mmHg. The dimensionless index is 0.53.    Mitral Valve: There is mild regurgitation.    Tricuspid Valve: There is mild regurgitation.    Pulmonic Valve: There is mild regurgitation.    Pulmonary Artery: The estimated pulmonary artery systolic pressure is 32 mmHg.    IVC/SVC: Normal venous pressure at 3 mmHg.      Admitted 8/6/23  Paroxysmal atrial fibrillation  -Patient with Paroxysmal (<7 days) atrial fibrillation   -Treated with Amio initially but due to pulmonary disease and risk of long term harm this was discontinued  -He underwent  DCCV x 2 and now back in NSR  -Continue eliquis, flecainide and metoprolol  -Follow up with cardiology    L MPI 8/11/23 (images prev personally reviewed and interpreted)    Normal myocardial perfusion scan. There is no evidence of myocardial ischemia or infarction.    The gated perfusion images showed an ejection fraction of 64% post stress.    There is normal wall motion post stress.     KASHIF/DCCV 8/8/23    Left Ventricle: Normal wall motion. There is normal systolic function with a visually estimated ejection fraction of 55 - 60%.    Left Atrium: Left atrium is moderately dilated. There is no thrombus in the left atrial appendage.    Right Ventricle: Right ventricle was not well visualized due to poor acoustic window.    Aortic Valve: There is mild aortic valve sclerosis.    Mitral Valve: There is mild regurgitation.    Tricuspid Valve: There is mild regurgitation.    The pre-cardioversion rhythm was atrial fibrillation. A 200 J synchronized cardioversion was successfully performed with restoration of normal sinus rhythm. The post-cardioversion rhythm was normal sinus rhythm.     Echo: 8/7/23    Left Ventricle: The left ventricle is normal in size. Mildly increased wall thickness. Normal wall motion. There is normal systolic function with a visually estimated ejection fraction of 65 - 70%. Grade I diastolic dysfunction.    Left Atrium: Left atrium is severely dilated.    Right Ventricle: Normal right ventricular cavity size. Systolic function is normal.    Right Atrium: Right atrium is moderately dilated.    Aortic Valve: There is aortic valve sclerosis.    Tricuspid Valve: There is mild regurgitation.    Pulmonary Artery: The estimated pulmonary artery systolic pressure is 23 mmHg.    IVC/SVC: Normal venous pressure at 3 mmHg.

## 2023-09-27 NOTE — ANESTHESIA POSTPROCEDURE EVALUATION
Anesthesia Post Evaluation    Patient: Vivek Whitman    Procedure(s) Performed: Procedure(s) (LRB):  Cardioversion or Defibrillation (N/A)  Cardioversion (N/A)    Final Anesthesia Type: MAC      Patient location: OR 8.  Patient participation: Yes- Able to Participate  Level of consciousness: awake and alert  Post-procedure vital signs: reviewed and stable  Pain management: adequate  Airway patency: patent    PONV status at discharge: No PONV  Anesthetic complications: no      Cardiovascular status: hemodynamically stable  Respiratory status: unassisted and spontaneous ventilation  Hydration status: euvolemic  Follow-up not needed.          Vitals Value Taken Time   /70 09/27/23 1126   Temp 36.5 09/27/23 1126   Pulse 56 09/27/23 1126   Resp 22 09/27/23 1126   SpO2 98% 09/27/23 1126         No case tracking events are documented in the log.      Pain/Bonifacio Score: Pain Rating Prior to Med Admin: 5 (9/26/2023  6:29 PM)  Pain Rating Post Med Admin: 3 (9/26/2023  4:00 PM)  Bonifacio Score: 10 (9/27/2023 11:06 AM)

## 2023-09-27 NOTE — SUBJECTIVE & OBJECTIVE
Past Medical History:   Diagnosis Date    Hypertension        Past Surgical History:   Procedure Laterality Date    CARDIOVERSION N/A 8/8/2023    Procedure: Cardioversion;  Surgeon: Dank Claudio MD;  Location: [facility] CATH LAB;  Service: Cardiology;  Laterality: N/A;    CARDIOVERSION N/A 8/10/2023    Procedure: Cardioversion;  Surgeon: Dank Claudio MD;  Location: [facility] CATH LAB;  Service: Cardiology;  Laterality: N/A;    HERNIA REPAIR      TRANSESOPHAGEAL ECHOCARDIOGRAM WITH POSSIBLE CARDIOVERSION (KASHIF W/ POSS CARDIOVERSION) N/A 8/8/2023    Procedure: Transesophageal echo (KASHIF) intra-procedure log documentation;  Surgeon: Dank Claudio MD;  Location: [facility] CATH LAB;  Service: Cardiology;  Laterality: N/A;    TRANSESOPHAGEAL ECHOCARDIOGRAPHY N/A 8/8/2023    Procedure: ECHOCARDIOGRAM, TRANSESOPHAGEAL;  Surgeon: Dank Claudio MD;  Location: [facility] CATH LAB;  Service: Cardiology;  Laterality: N/A;    TREATMENT OF CARDIAC ARRHYTHMIA N/A 8/10/2023    Procedure: Cardioversion or Defibrillation;  Surgeon: Dank Claudio MD;  Location: [facility] CATH LAB;  Service: Cardiology;  Laterality: N/A;       Review of patient's allergies indicates:  No Known Allergies    No current facility-administered medications on file prior to encounter.     Current Outpatient Medications on File Prior to Encounter   Medication Sig    allopurinoL (ZYLOPRIM) 100 MG tablet Take 1 tablet (100 mg total) by mouth once daily.    apixaban (ELIQUIS) 5 mg Tab Take 1 tablet (5 mg total) by mouth 2 (two) times daily.    aspirin 81 MG Chew Take 1 tablet (81 mg total) by mouth once daily.    famotidine (PEPCID) 20 MG tablet Take 1 tablet (20 mg total) by mouth once daily.    flecainide (TAMBOCOR) 100 MG Tab Take 1 tablet (100 mg total) by mouth every 12 (twelve) hours.    metoprolol succinate (TOPROL-XL) 25 MG 24 hr tablet Take 1 tablet (25 mg total) by mouth once daily.    naloxone (NARCAN) 1 mg/mL injection 2 mg (1 mg per nostril) by Nasal route as  needed for opioid overdose; may repeat in 3 to 5 minutes if not effective. Call 911    nicotine (NICODERM CQ) 7 mg/24 hr Place 1 patch onto the skin once daily.    rosuvastatin (CRESTOR) 20 MG tablet Take 20 mg by mouth once daily.     Family History    None       Tobacco Use    Smoking status: Every Day     Current packs/day: 1.00     Types: Cigarettes    Smokeless tobacco: Not on file   Substance and Sexual Activity    Alcohol use: Not Currently     Alcohol/week: 12.0 standard drinks of alcohol     Types: 12 Cans of beer per week    Drug use: No    Sexual activity: Yes     Review of Systems   Constitutional: Negative for decreased appetite.   HENT:  Negative for ear discharge.    Eyes:  Negative for blurred vision.   Endocrine: Negative for polyphagia.   Skin:  Negative for nail changes.   Genitourinary:  Negative for bladder incontinence.   Neurological:  Negative for aphonia.   Psychiatric/Behavioral:  Negative for hallucinations.    Allergic/Immunologic: Negative for hives.     Objective:     Vital Signs (Most Recent):  Temp: 98.9 °F (37.2 °C) (09/27/23 0730)  Pulse: 91 (09/27/23 0930)  Resp: (!) 24 (09/27/23 0930)  BP: 121/83 (09/27/23 0900)  SpO2: (!) 93 % (09/27/23 0930) Vital Signs (24h Range):  Temp:  [98.3 °F (36.8 °C)-100.4 °F (38 °C)] 98.9 °F (37.2 °C)  Pulse:  [] 91  Resp:  [17-42] 24  SpO2:  [88 %-98 %] 93 %  BP: ()/(50-99) 121/83     Weight: 95.7 kg (210 lb 15.7 oz)  Body mass index is 32.08 kg/m².    SpO2: (!) 93 %         Intake/Output Summary (Last 24 hours) at 9/27/2023 1004  Last data filed at 9/27/2023 0753  Gross per 24 hour   Intake 84.13 ml   Output 2275 ml   Net -2190.87 ml       Lines/Drains/Airways       Peripheral Intravenous Line  Duration                  Peripheral IV - Single Lumen 09/26/23 1700 20 G Left Antecubital <1 day         Peripheral IV - Single Lumen 09/26/23 1838 20 G Left;Posterior Hand <1 day                     Physical Exam  Constitutional:        Appearance: He is well-developed.   HENT:      Head: Normocephalic and atraumatic.   Eyes:      Conjunctiva/sclera: Conjunctivae normal.      Pupils: Pupils are equal, round, and reactive to light.   Cardiovascular:      Rate and Rhythm: Normal rate. Rhythm irregular.      Pulses: Intact distal pulses.      Heart sounds: Normal heart sounds.   Pulmonary:      Effort: Pulmonary effort is normal.      Breath sounds: Normal breath sounds.   Abdominal:      General: Bowel sounds are normal.      Palpations: Abdomen is soft.   Musculoskeletal:         General: Normal range of motion.      Cervical back: Normal range of motion and neck supple.   Skin:     General: Skin is warm and dry.   Neurological:      Mental Status: He is alert and oriented to person, place, and time.          Significant Labs: All pertinent lab results from the last 24 hours have been reviewed.    Significant Imaging: Echocardiogram: 2D echo with color flow doppler: No results found for this or any previous visit.

## 2023-09-27 NOTE — HPI
Vivek Whitman is a 63 year old male with  PAF on eliquis, HTN, recent diagnosis of Hep C who presented for intermittent RUQ pain that started yesterday. Initially was a 9/10, it has improved today. He denies nausea, vomiting, changes in bowel habits, fevers or chills. On admission, he was noted to be in a-fib with RVR and underwent a cardioversion today. He is now in NSR. He is currently on eliquis.     Labs with resolved leukocytosis without antibiotics, normal LFTs and tbili. CT scan and US without signs of acute cholecystitis, however gallstones are present.

## 2023-09-27 NOTE — ASSESSMENT & PLAN NOTE
Had KASHIF/CV 8/8/23. Back in A-fib RVR - rates improved with IV diltiazem but still with A-fib. Reports compliance with flecainide and eliquis. EF 60-65% on echo.  CV today ate small amount of clear broth this AM. If PAF retunes consider sotalol or multaq

## 2023-09-27 NOTE — CONSULTS
[facility] Intensive Care  General Surgery  Consult Note    Patient Name: Vivek Whitman  MRN: [id number]  Code Status: Full Code  Admission Date: 9/26/2023  Hospital Length of Stay: 1 days  Attending Physician: Landon Yan MD  Primary Care Provider: Racheal Primary Doctor    Patient information was obtained from patient, past medical records and ER records.     Inpatient consult to General Surgery  Consult performed by: Marylu Olmedo MD  Consult ordered by: Nathan Vick MD        Subjective:     Principal Problem: Paroxysmal atrial fibrillation    History of Present Illness: Vivek Whitman is a 63 year old male with  PAF on eliquis, HTN, recent diagnosis of Hep C who presented for intermittent RUQ pain that started yesterday. Initially was a 9/10, it has improved today. He denies nausea, vomiting, changes in bowel habits, fevers or chills. On admission, he was noted to be in a-fib with RVR and underwent a cardioversion today. He is now in NSR. He is currently on eliquis.     Labs with resolved leukocytosis without antibiotics, normal LFTs and tbili. CT scan and US without signs of acute cholecystitis, however gallstones are present.       No current facility-administered medications on file prior to encounter.     Current Outpatient Medications on File Prior to Encounter   Medication Sig    allopurinoL (ZYLOPRIM) 100 MG tablet Take 1 tablet (100 mg total) by mouth once daily.    apixaban (ELIQUIS) 5 mg Tab Take 1 tablet (5 mg total) by mouth 2 (two) times daily.    aspirin 81 MG Chew Take 1 tablet (81 mg total) by mouth once daily.    famotidine (PEPCID) 20 MG tablet Take 1 tablet (20 mg total) by mouth once daily.    flecainide (TAMBOCOR) 100 MG Tab Take 1 tablet (100 mg total) by mouth every 12 (twelve) hours.    metoprolol succinate (TOPROL-XL) 25 MG 24 hr tablet Take 1 tablet (25 mg total) by mouth once daily.    naloxone (NARCAN) 1 mg/mL injection 2 mg (1 mg per nostril) by Nasal route as needed  for opioid overdose; may repeat in 3 to 5 minutes if not effective. Call 911    nicotine (NICODERM CQ) 7 mg/24 hr Place 1 patch onto the skin once daily.    rosuvastatin (CRESTOR) 20 MG tablet Take 20 mg by mouth once daily.       Review of patient's allergies indicates:  No Known Allergies    Past Medical History:   Diagnosis Date    Hypertension      Past Surgical History:   Procedure Laterality Date    CARDIOVERSION N/A 8/8/2023    Procedure: Cardioversion;  Surgeon: Dank Claudio MD;  Location: [facility] CATH LAB;  Service: Cardiology;  Laterality: N/A;    CARDIOVERSION N/A 8/10/2023    Procedure: Cardioversion;  Surgeon: Dank Claudio MD;  Location: [facility] CATH LAB;  Service: Cardiology;  Laterality: N/A;    HERNIA REPAIR      TRANSESOPHAGEAL ECHOCARDIOGRAM WITH POSSIBLE CARDIOVERSION (KASHIF W/ POSS CARDIOVERSION) N/A 8/8/2023    Procedure: Transesophageal echo (KASHIF) intra-procedure log documentation;  Surgeon: Dank Claudio MD;  Location: [facility] CATH LAB;  Service: Cardiology;  Laterality: N/A;    TRANSESOPHAGEAL ECHOCARDIOGRAPHY N/A 8/8/2023    Procedure: ECHOCARDIOGRAM, TRANSESOPHAGEAL;  Surgeon: Dank Claudio MD;  Location: [facility] CATH LAB;  Service: Cardiology;  Laterality: N/A;    TREATMENT OF CARDIAC ARRHYTHMIA N/A 8/10/2023    Procedure: Cardioversion or Defibrillation;  Surgeon: Dank Claudio MD;  Location: [facility] CATH LAB;  Service: Cardiology;  Laterality: N/A;     Family History    None       Tobacco Use    Smoking status: Every Day     Current packs/day: 1.00     Types: Cigarettes    Smokeless tobacco: Not on file   Substance and Sexual Activity    Alcohol use: Not Currently     Alcohol/week: 12.0 standard drinks of alcohol     Types: 12 Cans of beer per week    Drug use: No    Sexual activity: Yes     Review of Systems   Constitutional:  Negative for chills and fever.   Respiratory:  Negative for cough and shortness of breath.    Cardiovascular:  Negative for chest pain and  palpitations.   Gastrointestinal:  Positive for abdominal pain. Negative for abdominal distention, constipation, diarrhea, nausea and vomiting.     Objective:     Vital Signs (Most Recent):  Temp: 98.4 °F (36.9 °C) (09/27/23 1500)  Pulse: 74 (09/27/23 1500)  Resp: (!) 22 (09/27/23 1500)  BP: (!) 142/74 (09/27/23 1500)  SpO2: 95 % (09/27/23 1500) Vital Signs (24h Range):  Temp:  [97.7 °F (36.5 °C)-100.4 °F (38 °C)] 98.4 °F (36.9 °C)  Pulse:  [] 74  Resp:  [17-42] 22  SpO2:  [88 %-98 %] 95 %  BP: ()/(50-96) 142/74     Weight: 95.7 kg (210 lb 15.7 oz)  Body mass index is 32.08 kg/m².     Physical Exam  Vitals reviewed.   Constitutional:       General: He is not in acute distress.  HENT:      Head: Normocephalic and atraumatic.      Mouth/Throat:      Mouth: Mucous membranes are moist.   Cardiovascular:      Rate and Rhythm: Normal rate.   Pulmonary:      Effort: Pulmonary effort is normal. No respiratory distress.   Abdominal:      General: There is no distension.      Palpations: Abdomen is soft.      Tenderness: There is abdominal tenderness (RUQ). There is no guarding or rebound.   Skin:     General: Skin is warm and dry.   Neurological:      General: No focal deficit present.      Mental Status: He is alert and oriented to person, place, and time.            I have reviewed all pertinent lab results within the past 24 hours.  CBC:   Recent Labs   Lab 09/27/23  0510   WBC 10.02   RBC 4.05*   HGB 13.1*   HCT 40.4   *   *   MCH 32.3*   MCHC 32.4     CMP:   Recent Labs   Lab 09/27/23  0510   GLU 94   CALCIUM 8.7   ALBUMIN 2.9*   PROT 6.5      K 3.6   CO2 25      BUN 17   CREATININE 0.9   ALKPHOS 41*   ALT 20   AST 22   BILITOT 1.2*       Significant Diagnostics:  I have reviewed all pertinent imaging results/findings within the past 24 hours.      Assessment/Plan:     Right upper quadrant abdominal pain  63 year old male with PMH of  PAF on eliquis, HTN, recent diagnosis of Hep C  admitted for RUQ pain and a-fib with RVR requiring cardioversion.     Pain is signficantly improved today with a resolution in his white count without antiboitics. Will follow along to ensure pain resolves. If pain worsens or has new leukocytosis would recommend a HIDA scan. At this point in time, he is a fairly high risk candidate with atrial fibrillation requiring cardioversion and ongoing anticoagulation.       VTE Risk Mitigation (From admission, onward)         Ordered     enoxaparin injection 100 mg  Every 12 hours         09/27/23 1422     IP VTE HIGH RISK PATIENT  Once         09/26/23 1646     Place sequential compression device  Until discontinued         09/26/23 1646                Thank you for your consult. I will follow-up with patient. Please contact us if you have any additional questions.    Marylu Olmedo MD  General Surgery  [facility] - Intensive Care

## 2023-09-28 PROBLEM — K80.00 CALCULUS OF GALLBLADDER WITH ACUTE CHOLECYSTITIS WITHOUT OBSTRUCTION: Status: ACTIVE | Noted: 2023-09-28

## 2023-09-28 LAB
ALBUMIN SERPL BCP-MCNC: 2.8 G/DL (ref 3.5–5.2)
ALP SERPL-CCNC: 46 U/L (ref 55–135)
ALT SERPL W/O P-5'-P-CCNC: 21 U/L (ref 10–44)
ANION GAP SERPL CALC-SCNC: 7 MMOL/L (ref 8–16)
AST SERPL-CCNC: 24 U/L (ref 10–40)
BASOPHILS # BLD AUTO: 0.05 K/UL (ref 0–0.2)
BASOPHILS NFR BLD: 0.6 % (ref 0–1.9)
BILIRUB SERPL-MCNC: 0.8 MG/DL (ref 0.1–1)
BUN SERPL-MCNC: 16 MG/DL (ref 8–23)
CALCIUM SERPL-MCNC: 8.9 MG/DL (ref 8.7–10.5)
CHLORIDE SERPL-SCNC: 104 MMOL/L (ref 95–110)
CO2 SERPL-SCNC: 25 MMOL/L (ref 23–29)
CREAT SERPL-MCNC: 0.9 MG/DL (ref 0.5–1.4)
DIFFERENTIAL METHOD: ABNORMAL
EOSINOPHIL # BLD AUTO: 0.2 K/UL (ref 0–0.5)
EOSINOPHIL NFR BLD: 1.9 % (ref 0–8)
ERYTHROCYTE [DISTWIDTH] IN BLOOD BY AUTOMATED COUNT: 16.7 % (ref 11.5–14.5)
EST. GFR  (NO RACE VARIABLE): >60 ML/MIN/1.73 M^2
GLUCOSE SERPL-MCNC: 82 MG/DL (ref 70–110)
HCT VFR BLD AUTO: 37.8 % (ref 40–54)
HGB BLD-MCNC: 12.5 G/DL (ref 14–18)
IMM GRANULOCYTES # BLD AUTO: 0.01 K/UL (ref 0–0.04)
IMM GRANULOCYTES NFR BLD AUTO: 0.1 % (ref 0–0.5)
LYMPHOCYTES # BLD AUTO: 2.1 K/UL (ref 1–4.8)
LYMPHOCYTES NFR BLD: 26.5 % (ref 18–48)
MAGNESIUM SERPL-MCNC: 1.9 MG/DL (ref 1.6–2.6)
MCH RBC QN AUTO: 32.6 PG (ref 27–31)
MCHC RBC AUTO-ENTMCNC: 33.1 G/DL (ref 32–36)
MCV RBC AUTO: 99 FL (ref 82–98)
MONOCYTES # BLD AUTO: 0.9 K/UL (ref 0.3–1)
MONOCYTES NFR BLD: 11.5 % (ref 4–15)
NEUTROPHILS # BLD AUTO: 4.6 K/UL (ref 1.8–7.7)
NEUTROPHILS NFR BLD: 59.4 % (ref 38–73)
NRBC BLD-RTO: 0 /100 WBC
PHOSPHATE SERPL-MCNC: 2.6 MG/DL (ref 2.7–4.5)
PLATELET # BLD AUTO: 121 K/UL (ref 150–450)
PMV BLD AUTO: 10.8 FL (ref 9.2–12.9)
POTASSIUM SERPL-SCNC: 3.5 MMOL/L (ref 3.5–5.1)
PROT SERPL-MCNC: 6.4 G/DL (ref 6–8.4)
RBC # BLD AUTO: 3.83 M/UL (ref 4.6–6.2)
SODIUM SERPL-SCNC: 136 MMOL/L (ref 136–145)
WBC # BLD AUTO: 7.75 K/UL (ref 3.9–12.7)

## 2023-09-28 PROCEDURE — 63600175 PHARM REV CODE 636 W HCPCS: Performed by: HOSPITALIST

## 2023-09-28 PROCEDURE — 84100 ASSAY OF PHOSPHORUS: CPT | Performed by: HOSPITALIST

## 2023-09-28 PROCEDURE — 80053 COMPREHEN METABOLIC PANEL: CPT | Performed by: HOSPITALIST

## 2023-09-28 PROCEDURE — 85025 COMPLETE CBC W/AUTO DIFF WBC: CPT | Performed by: HOSPITALIST

## 2023-09-28 PROCEDURE — 97161 PT EVAL LOW COMPLEX 20 MIN: CPT

## 2023-09-28 PROCEDURE — 25000003 PHARM REV CODE 250: Performed by: INTERNAL MEDICINE

## 2023-09-28 PROCEDURE — 99233 SBSQ HOSP IP/OBS HIGH 50: CPT | Mod: ,,, | Performed by: SURGERY

## 2023-09-28 PROCEDURE — 25000003 PHARM REV CODE 250: Performed by: HOSPITALIST

## 2023-09-28 PROCEDURE — 99233 PR SUBSEQUENT HOSPITAL CARE,LEVL III: ICD-10-PCS | Mod: ,,, | Performed by: SURGERY

## 2023-09-28 PROCEDURE — 21400001 HC TELEMETRY ROOM

## 2023-09-28 PROCEDURE — 83735 ASSAY OF MAGNESIUM: CPT | Performed by: HOSPITALIST

## 2023-09-28 RX ADMIN — FLECAINIDE ACETATE 100 MG: 50 TABLET ORAL at 08:09

## 2023-09-28 RX ADMIN — METOPROLOL SUCCINATE 25 MG: 25 TABLET, EXTENDED RELEASE ORAL at 08:09

## 2023-09-28 RX ADMIN — MUPIROCIN: 20 OINTMENT TOPICAL at 08:09

## 2023-09-28 RX ADMIN — ENOXAPARIN SODIUM 100 MG: 100 INJECTION SUBCUTANEOUS at 02:09

## 2023-09-28 RX ADMIN — FAMOTIDINE 20 MG: 20 TABLET ORAL at 08:09

## 2023-09-28 RX ADMIN — VANCOMYCIN HYDROCHLORIDE 2250 MG: 750 INJECTION, POWDER, LYOPHILIZED, FOR SOLUTION INTRAVENOUS at 09:09

## 2023-09-28 RX ADMIN — PIPERACILLIN SODIUM AND TAZOBACTAM SODIUM 4.5 G: 4; .5 INJECTION, POWDER, LYOPHILIZED, FOR SOLUTION INTRAVENOUS at 08:09

## 2023-09-28 RX ADMIN — PIPERACILLIN SODIUM AND TAZOBACTAM SODIUM 4.5 G: 4; .5 INJECTION, POWDER, LYOPHILIZED, FOR SOLUTION INTRAVENOUS at 02:09

## 2023-09-28 RX ADMIN — ATORVASTATIN CALCIUM 40 MG: 40 TABLET, FILM COATED ORAL at 08:09

## 2023-09-28 RX ADMIN — PIPERACILLIN SODIUM AND TAZOBACTAM SODIUM 4.5 G: 4; .5 INJECTION, POWDER, LYOPHILIZED, FOR SOLUTION INTRAVENOUS at 11:09

## 2023-09-28 RX ADMIN — ASPIRIN 81 MG CHEWABLE TABLET 81 MG: 81 TABLET CHEWABLE at 08:09

## 2023-09-28 RX ADMIN — ALLOPURINOL 100 MG: 100 TABLET ORAL at 08:09

## 2023-09-28 NOTE — PLAN OF CARE
Problem: Adult Inpatient Plan of Care  Goal: Plan of Care Review  9/28/2023 0401 by Mariposa Pepe RN  Outcome: Ongoing, Progressing  9/28/2023 0400 by Mariposa Pepe RN  Outcome: Ongoing, Progressing  Goal: Patient-Specific Goal (Individualized)  9/28/2023 0401 by Mariposa Pepe RN  Outcome: Ongoing, Progressing  9/28/2023 0400 by Mariposa Pepe RN  Outcome: Ongoing, Progressing  Goal: Absence of Hospital-Acquired Illness or Injury  9/28/2023 0401 by Mariposa Pepe RN  Outcome: Ongoing, Progressing  9/28/2023 0400 by Mariposa Pepe RN  Outcome: Ongoing, Progressing  Intervention: Identify and Manage Fall Risk  9/28/2023 0401 by Mariposa Pepe RN  Flowsheets (Taken 9/28/2023 0401)  Safety Promotion/Fall Prevention:   assistive device/personal item within reach   side rails raised x 2  9/28/2023 0400 by Mariposa Pepe RN  Flowsheets (Taken 9/28/2023 0400)  Safety Promotion/Fall Prevention: assistive device/personal item within reach  Intervention: Prevent Skin Injury  Flowsheets (Taken 9/28/2023 0401)  Body Position:   position changed independently   position maintained  Skin Protection: adhesive use limited  Intervention: Prevent and Manage VTE (Venous Thromboembolism) Risk  Flowsheets (Taken 9/28/2023 0401)  Activity Management: Ambulated to bathroom - L4  VTE Prevention/Management:   remove, assess skin, and reapply sequential compression device   ambulation promoted  Range of Motion: active ROM (range of motion) encouraged  Intervention: Prevent Infection  Flowsheets (Taken 9/28/2023 0401)  Infection Prevention:   single patient room provided   hand hygiene promoted  Goal: Optimal Comfort and Wellbeing  9/28/2023 0401 by Mariposa Pepe RN  Outcome: Ongoing, Progressing  9/28/2023 0400 by Mariposa Pepe RN  Outcome: Ongoing, Progressing  Intervention: Monitor Pain and Promote Comfort  Flowsheets (Taken 9/28/2023 0401)  Pain Management Interventions:   relaxation  techniques promoted   quiet environment facilitated  Intervention: Provide Person-Centered Care  Flowsheets (Taken 9/28/2023 0401)  Trust Relationship/Rapport:   care explained   choices provided   emotional support provided   empathic listening provided  Goal: Readiness for Transition of Care  Outcome: Ongoing, Progressing     Problem: Infection Progression (Sepsis/Septic Shock)  Goal: Absence of Infection Signs and Symptoms  Outcome: Ongoing, Progressing  Intervention: Initiate Sepsis Management  Flowsheets (Taken 9/28/2023 0401)  Infection Prevention:   single patient room provided   hand hygiene promoted  Infection Management: aseptic technique maintained  Intervention: Promote Recovery  Flowsheets (Taken 9/28/2023 0401)  Activity Management: Ambulated to bathroom - L4     Problem: Dysrhythmia  Goal: Normalized Cardiac Rhythm  Outcome: Ongoing, Progressing  Intervention: Monitor and Manage Cardiac Rhythm Effect  Flowsheets (Taken 9/28/2023 0401)  Dysrhythmia Management: (medication) other (see comments)  VTE Prevention/Management:   remove, assess skin, and reapply sequential compression device   ambulation promoted

## 2023-09-28 NOTE — PROGRESS NOTES
[facility] Medicine  Progress Note    Patient Name: Vivek Whitman  MRN: [id number]  Patient Class: IP- Inpatient   Admission Date: 9/26/2023  Length of Stay: 2 days  Attending Physician: Wen Alatorre, *  Primary Care Provider: Racheal, Primary Doctor        Subjective:     Principal Problem:Paroxysmal atrial fibrillation        HPI:  64 y/o M with PMHx significant for PAF on eliquis, HTN, recent diagnosis of Hep C presents to the ER complaining of abdominal pain.  Patinet reports RUQ abdominal pain that started last night.  Pain is intermittent.  Rates pain 9/10 in severity, worsening when driving over bumps on the road.  Also reports chronic dyspnea and BLE swelling.  Hx of ETOH abuse, but denies any recent use.  No alleviating or aggravating factors.  Denies any nausea, vomiting or changes in BM associated with abdominal pain.  He presented to ER where he was noted to be in rapid Afib.  He also spiked a low grade fever in ER.  Denies any other complaints.      Overview/Hospital Course:  64 y/o male with PAF presented with RUQ abdominal pain.  Noted to be in Afib with RVR and admitted to ICU on Cardizem drip.  Cardiology consulted.  RUQ US just showing gallstones.  Patient with slight leukocytosis and low grade fever in ER.  CT ordered showing possible acute cholecystitis.  Surgery consulted.surgery want patient be continued  monitor while is on IV Abx,patients pain is improving,tolerating diet.      Interval History: feeling better,RUQ abdominal pain is improved.    Review of Systems   HENT:  Negative for ear discharge and ear pain.    Eyes:  Negative for discharge and itching.   Endocrine: Negative for cold intolerance and heat intolerance.   Neurological:  Negative for seizures and syncope.     Objective:     Vital Signs (Most Recent):  Temp: 99 °F (37.2 °C) (09/28/23 0507)  Pulse: 76 (09/28/23 0507)  Resp: 18 (09/28/23 0507)  BP: (!) 153/75 (09/28/23 0808)  SpO2: (!) 93 % (09/28/23 0507)  Vital Signs (24h Range):  Temp:  [97.7 °F (36.5 °C)-99.6 °F (37.6 °C)] 99 °F (37.2 °C)  Pulse:  [56-84] 76  Resp:  [18-44] 18  SpO2:  [92 %-99 %] 93 %  BP: (111-170)/(62-90) 153/75     Weight: 95.2 kg (209 lb 14.1 oz)  Body mass index is 31.91 kg/m².    Intake/Output Summary (Last 24 hours) at 9/28/2023 1015  Last data filed at 9/28/2023 0353  Gross per 24 hour   Intake 177.99 ml   Output 1275 ml   Net -1097.01 ml           Physical Exam  Constitutional:       Appearance: He is not toxic-appearing or diaphoretic.   HENT:      Head: Normocephalic and atraumatic.      Mouth/Throat:      Mouth: Mucous membranes are dry.      Pharynx: No oropharyngeal exudate or posterior oropharyngeal erythema.   Cardiovascular:      Rate and Rhythm: Normal rate and regular rhythm.   Pulmonary:      Effort: Pulmonary effort is normal.      Breath sounds: No wheezing.   Abdominal:      General: Bowel sounds are normal.      Palpations: Abdomen is soft.      Comments: Tender to palpation over RUQ   Musculoskeletal:         General: No deformity or signs of injury.   Skin:     General: Skin is warm and dry.   Neurological:      Mental Status: He is oriented to person, place, and time.      Cranial Nerves: No cranial nerve deficit.             Significant Labs: All pertinent labs within the past 24 hours have been reviewed.  BMP:   Recent Labs   Lab 09/28/23  0353   GLU 82      K 3.5      CO2 25   BUN 16   CREATININE 0.9   CALCIUM 8.9   MG 1.9       CBC:   Recent Labs   Lab 09/26/23  1338 09/27/23  0510 09/28/23  0353   WBC 12.83* 10.02 7.75   HGB 14.1 13.1* 12.5*   HCT 43.1 40.4 37.8*   * 120* 121*         Significant Imaging: I have reviewed all pertinent imaging results/findings within the past 24 hours.      Assessment/Plan:      * Paroxysmal atrial fibrillation  Patient with Paroxysmal (<7 days) atrial fibrillation which is uncontrolled currently with Beta Blocker. Patient is currently in atrial  fibrillation.[id number] Score: 1. . Anticoagulation indicated. Anticoagulation done with Eliquis.  Noted to be in Afib with RVR on presentation.  Did not respond to Cardizem pushes and placed on Cardizem drip.  Cardiology consulted  Resume home Metoprolol and Flecainide.  On Eliquis.  S/P successful cardioversion today.      Calculus of gallbladder with acute cholecystitis without obstruction  Per imaging and clinical RUQ ain,on IV Abx,pain is improving,suregry want monitor.      Benign essential hypertension  Resume home medications with parameters.    Right upper quadrant abdominal pain  Main complaint on presentation.  RUQ US showing cholelithiasis/sludge, no secondary findings to suggest acute cholecystitis.  The common duct is upper limits of normal in caliber.  Patient then spiked low grade fever in ER. Does have slight leukocytosis.  Abdominal CT concerning for acute cholecystitis.  Started Zosyn and consult Surgery.  Change Eliquis to Lovenox in case any procedure needed.   Surgery consulted.surgery want patient be continued  monitor while is on IV Abx,patients pain is improving,tolerating diet.    Class 1 obesity with body mass index (BMI) of 31.0 to 31.9 in adult  Body mass index is 32.08 kg/m². Morbid obesity complicates all aspects of disease management from diagnostic modalities to treatment. Weight loss encouraged and health benefits explained to patient.         Tobacco abuse  Spent more than 3 minutes on smoking cessation counseling.  Provide nicotine patch.        VTE Risk Mitigation (From admission, onward)         Ordered     enoxaparin injection 100 mg  Every 12 hours         09/27/23 1422     IP VTE HIGH RISK PATIENT  Once         09/26/23 1646     Place sequential compression device  Until discontinued         09/26/23 1646                Discharge Planning   ELODIA:      Code Status: Full Code   Is the patient medically ready for discharge?:     Reason for patient still in hospital (select all  that apply): Patient trending condition                     Wen Alatorre MD  Department of Hospital Medicine   [facility]

## 2023-09-28 NOTE — SUBJECTIVE & OBJECTIVE
Interval History: Started on antibiotics. RUQ tenderness improved. White count normalized.     Medications:  Continuous Infusions:  Scheduled Meds:   allopurinoL  100 mg Oral Daily    aspirin  81 mg Oral Daily    atorvastatin  40 mg Oral Daily    enoxparin  1 mg/kg Subcutaneous Q12H (treatment, non-standard time)    famotidine  20 mg Oral Daily    flecainide  100 mg Oral Q12H    metoprolol succinate  25 mg Oral Daily    mupirocin   Nasal BID    nicotine  1 patch Transdermal Daily    piperacillin-tazobactam (Zosyn) IV (PEDS and ADULTS) (extended infusion is not appropriate)  4.5 g Intravenous Q8H     PRN Meds:acetaminophen, melatonin, morphine, ondansetron, oxyCODONE-acetaminophen, polyethylene glycol, sodium chloride 0.9%     Review of patient's allergies indicates:  No Known Allergies  Objective:     Vital Signs (Most Recent):  Temp: 99 °F (37.2 °C) (09/28/23 0507)  Pulse: 76 (09/28/23 0507)  Resp: 18 (09/28/23 0507)  BP: (!) 153/75 (09/28/23 0808)  SpO2: (!) 93 % (09/28/23 0507) Vital Signs (24h Range):  Temp:  [97.7 °F (36.5 °C)-99.6 °F (37.6 °C)] 99 °F (37.2 °C)  Pulse:  [56-99] 76  Resp:  [18-44] 18  SpO2:  [92 %-99 %] 93 %  BP: (111-170)/(62-96) 153/75     Weight: 95.2 kg (209 lb 14.1 oz)  Body mass index is 31.91 kg/m².    Intake/Output - Last 3 Shifts         09/26 0700 09/27 0659 09/27 0700 09/28 0659 09/28 0700 09/29 0659    I.V. (mL/kg) 84.1 (0.9) 28.6 (0.3)     IV Piggyback  149.4     Total Intake(mL/kg) 84.1 (0.9) 178 (1.9)     Urine (mL/kg/hr) 1975 1575 (0.7)     Total Output 1975 1575     Net -1890.9 -1397                     Physical Exam  Vitals reviewed.   Constitutional:       General: He is not in acute distress.  HENT:      Head: Normocephalic and atraumatic.      Mouth/Throat:      Mouth: Mucous membranes are moist.   Cardiovascular:      Rate and Rhythm: Normal rate.   Pulmonary:      Effort: Pulmonary effort is normal. No respiratory distress.   Abdominal:      General: There is no  distension.      Palpations: Abdomen is soft.      Tenderness: There is abdominal tenderness (RUQ). There is no guarding or rebound.   Skin:     General: Skin is warm and dry.   Neurological:      General: No focal deficit present.      Mental Status: He is alert and oriented to person, place, and time.          Significant Labs:  I have reviewed all pertinent lab results within the past 24 hours.  CBC:   Recent Labs   Lab 09/28/23  0353   WBC 7.75   RBC 3.83*   HGB 12.5*   HCT 37.8*   *   MCV 99*   MCH 32.6*   MCHC 33.1     CMP:   Recent Labs   Lab 09/28/23  0353   GLU 82   CALCIUM 8.9   ALBUMIN 2.8*   PROT 6.4      K 3.5   CO2 25      BUN 16   CREATININE 0.9   ALKPHOS 46*   ALT 21   AST 24   BILITOT 0.8       Significant Diagnostics:  I have reviewed all pertinent imaging results/findings within the past 24 hours.

## 2023-09-28 NOTE — PT/OT/SLP EVAL
Physical Therapy Evaluation     Patient Name: Vivek Whitman   MRN: [id number]  Recent Surgery: Procedure(s) (LRB):  Cardioversion or Defibrillation (N/A)  Cardioversion (N/A) 1 Day Post-Op    Recommendations:     Discharge Recommendations: home   Discharge Equipment Recommendations: none       Assessment:     Vivek Whitman is a 63 y.o. male admitted with a medical diagnosis of Paroxysmal atrial fibrillation. He presents with the following impairments/functional limitations: impaired endurance, gait instability.     Rehab Prognosis: Good; patient would benefit from acute PT services to address these deficits and reach maximum level of function.    Plan:     During this hospitalization, patient to be seen 1x to address the above listed problems via initial evaluation.    Plan of Care Expires: 09/28/23    Subjective     Chief Complaint: None  Patient Comments/Goals: Pt agreeable to PT evaluation.  Pain/Comfort:  Pain Rating 1: 0/10    Social History:  Living Environment: Patient lives with their cousin  in a single story home with number of outside stair(s): 0  Prior Level of Function: Prior to admission, patient was independent  Equipment Used at Home: none  DME owned (not currently used): rolling walker  Assistance Upon Discharge: family    Objective:     Communicated with nurseKami after session. Patient found supine with telemetry upon PT entry to room.    General Precautions: Standard,     Orthopedic Precautions: N/A   Braces: N/A    Respiratory Status: Room air    Exams:  Cognition: Patient is oriented to Person, Place, Time, Situation  RLE ROM: WFL  RLE Strength: WFL  LLE ROM: WFL  LLE Strength: WFL  Sensation:    -       Intact  light/touch BLEs    Functional Mobility:  Gait belt applied - No  Bed Mobility  Supine to Sit: modified independence for all mobility  Transfers  Sit to Stand: modified independence with no AD  Gait  Patient ambulated 250' with no AD and handrail in hallway  and independence and  supervision. Patient demonstrates occasional unsteady gait.   Balance  Sitting: independence  Standing: independence and supervision      Therapeutic Activities and Exercises:   Patient educated on role of acute care PT and PT POC and call light usage  Patient educated about importance of OOB mobility and remaining up in chair most of the day.      AM-PAC 6 CLICK MOBILITY  Total Score:24    Patient left up in chair with call button in reach, RN notified, and all needs in reach .    GOALS:   Multidisciplinary Problems       Physical Therapy Goals          Problem: Physical Therapy    Goal Priority Disciplines Outcome Goal Variances Interventions   Physical Therapy Goal     PT, PT/OT Adequate for Care Transition                         History:     Past Medical History:   Diagnosis Date    Hypertension        Past Surgical History:   Procedure Laterality Date    CARDIOVERSION N/A 8/8/2023    Procedure: Cardioversion;  Surgeon: Dank Claudio MD;  Location: [facility] CATH LAB;  Service: Cardiology;  Laterality: N/A;    CARDIOVERSION N/A 8/10/2023    Procedure: Cardioversion;  Surgeon: Dank Claudio MD;  Location: [facility] CATH LAB;  Service: Cardiology;  Laterality: N/A;    CARDIOVERSION N/A 9/27/2023    Procedure: Cardioversion;  Surgeon: Orlando George MD;  Location: [facility] CATH LAB;  Service: Cardiology;  Laterality: N/A;    HERNIA REPAIR      TRANSESOPHAGEAL ECHOCARDIOGRAM WITH POSSIBLE CARDIOVERSION (KASHIF W/ POSS CARDIOVERSION) N/A 8/8/2023    Procedure: Transesophageal echo (KASHIF) intra-procedure log documentation;  Surgeon: Dank Claudio MD;  Location: [facility] CATH LAB;  Service: Cardiology;  Laterality: N/A;    TRANSESOPHAGEAL ECHOCARDIOGRAPHY N/A 8/8/2023    Procedure: ECHOCARDIOGRAM, TRANSESOPHAGEAL;  Surgeon: Dank Claudio MD;  Location: [facility] CATH LAB;  Service: Cardiology;  Laterality: N/A;    TREATMENT OF CARDIAC ARRHYTHMIA N/A 8/10/2023    Procedure: Cardioversion or Defibrillation;  Surgeon: Dank Claudio  MD GERRI;  Location: [facility] CATH LAB;  Service: Cardiology;  Laterality: N/A;    TREATMENT OF CARDIAC ARRHYTHMIA N/A 9/27/2023    Procedure: Cardioversion or Defibrillation;  Surgeon: Orlando George MD;  Location: [facility] CATH LAB;  Service: Cardiology;  Laterality: N/A;       Time Tracking:     PT Received On: 09/28/23  PT Start Time: 1338  PT Stop Time: 1346  PT Total Time (min): 8 min     Billable Minutes: Evaluation 8    9/28/2023

## 2023-09-28 NOTE — NURSING
[facility] [place]  Nurses Note -- 4 Eyes      9/28/2023       Skin assessed on: Q Shift      [x] No Pressure Injuries Present    []Prevention Measures Documented    [] Yes LDA  for Pressure Injury Previously documented     [] Yes New Pressure Injury Discovered   [] LDA for New Pressure Injury Added      Attending RN:  Denisa Delgado RN     Second RN: Roger Huffman RN

## 2023-09-28 NOTE — PROGRESS NOTES
[facility]  General Surgery  Progress Note    Subjective:     History of Present Illness:  Vivek Whitman is a 63 year old male with  PAF on eliquis, HTN, recent diagnosis of Hep C who presented for intermittent RUQ pain that started yesterday. Initially was a 9/10, it has improved today. He denies nausea, vomiting, changes in bowel habits, fevers or chills. On admission, he was noted to be in a-fib with RVR and underwent a cardioversion today. He is now in NSR. He is currently on eliquis.     Labs with resolved leukocytosis without antibiotics, normal LFTs and tbili. CT scan and US without signs of acute cholecystitis, however gallstones are present.       Post-Op Info:  Procedure(s) (LRB):  Cardioversion or Defibrillation (N/A)  Cardioversion (N/A)   1 Day Post-Op     Interval History: Started on antibiotics. RUQ tenderness improved. White count normalized.     Medications:  Continuous Infusions:  Scheduled Meds:   allopurinoL  100 mg Oral Daily    aspirin  81 mg Oral Daily    atorvastatin  40 mg Oral Daily    enoxparin  1 mg/kg Subcutaneous Q12H (treatment, non-standard time)    famotidine  20 mg Oral Daily    flecainide  100 mg Oral Q12H    metoprolol succinate  25 mg Oral Daily    mupirocin   Nasal BID    nicotine  1 patch Transdermal Daily    piperacillin-tazobactam (Zosyn) IV (PEDS and ADULTS) (extended infusion is not appropriate)  4.5 g Intravenous Q8H     PRN Meds:acetaminophen, melatonin, morphine, ondansetron, oxyCODONE-acetaminophen, polyethylene glycol, sodium chloride 0.9%     Review of patient's allergies indicates:  No Known Allergies  Objective:     Vital Signs (Most Recent):  Temp: 99 °F (37.2 °C) (09/28/23 0507)  Pulse: 76 (09/28/23 0507)  Resp: 18 (09/28/23 0507)  BP: (!) 153/75 (09/28/23 0808)  SpO2: (!) 93 % (09/28/23 0507) Vital Signs (24h Range):  Temp:  [97.7 °F (36.5 °C)-99.6 °F (37.6 °C)] 99 °F (37.2 °C)  Pulse:  [56-99] 76  Resp:  [18-44] 18  SpO2:  [92 %-99 %] 93  %  BP: (111-170)/(62-96) 153/75     Weight: 95.2 kg (209 lb 14.1 oz)  Body mass index is 31.91 kg/m².    Intake/Output - Last 3 Shifts         09/26 0700 09/27 0659 09/27 0700 09/28 0659 09/28 0700 09/29 0659    I.V. (mL/kg) 84.1 (0.9) 28.6 (0.3)     IV Piggyback  149.4     Total Intake(mL/kg) 84.1 (0.9) 178 (1.9)     Urine (mL/kg/hr) 1975 1575 (0.7)     Total Output 1975 1575     Net -1890.9 -1397                     Physical Exam  Vitals reviewed.   Constitutional:       General: He is not in acute distress.  HENT:      Head: Normocephalic and atraumatic.      Mouth/Throat:      Mouth: Mucous membranes are moist.   Cardiovascular:      Rate and Rhythm: Normal rate.   Pulmonary:      Effort: Pulmonary effort is normal. No respiratory distress.   Abdominal:      General: There is no distension.      Palpations: Abdomen is soft.      Tenderness: There is abdominal tenderness (RUQ). There is no guarding or rebound.   Skin:     General: Skin is warm and dry.   Neurological:      General: No focal deficit present.      Mental Status: He is alert and oriented to person, place, and time.          Significant Labs:  I have reviewed all pertinent lab results within the past 24 hours.  CBC:   Recent Labs   Lab 09/28/23  0353   WBC 7.75   RBC 3.83*   HGB 12.5*   HCT 37.8*   *   MCV 99*   MCH 32.6*   MCHC 33.1     CMP:   Recent Labs   Lab 09/28/23  0353   GLU 82   CALCIUM 8.9   ALBUMIN 2.8*   PROT 6.4      K 3.5   CO2 25      BUN 16   CREATININE 0.9   ALKPHOS 46*   ALT 21   AST 24   BILITOT 0.8       Significant Diagnostics:  I have reviewed all pertinent imaging results/findings within the past 24 hours.    Assessment/Plan:     Right upper quadrant abdominal pain  63 year old male with PMH of  PAF on eliquis, HTN, recent diagnosis of Hep C admitted for RUQ pain and a-fib with RVR requiring cardioversion.     Pain signficantly improved with a resolution in his white count without antiboitics. However, he  is on antibiotics and continues to have RUQ tenderness. Suspect his RUQ tenderness may be due to his Hep C. Would recommend continuing antibiotics for a 7 day course. Would not operate with his new diagnosis of Hep C; if he recurs he will need drainage.         Johnathon Mendieta MD  General Surgery  [facility]

## 2023-09-28 NOTE — PLAN OF CARE
Problem: Adult Inpatient Plan of Care  Goal: Plan of Care Review  Outcome: Ongoing, Progressing     Problem: Adjustment to Illness (Sepsis/Septic Shock)  Goal: Optimal Coping  Outcome: Ongoing, Progressing     Problem: Dysrhythmia  Goal: Normalized Cardiac Rhythm  Outcome: Ongoing, Progressing

## 2023-09-28 NOTE — PLAN OF CARE
Case Management Assessment     PCP: needs a PCP  Pharmacy: [facility])    Patient Arrived From: Home  Existing Help at Home: Relative     Barriers to Discharge: none    Discharge Plan:    A. Home with Family; TBD   B. Home with Family; TBD       Patient confirmed information on face sheet. Patient stated he lives with his cousin who will be his help at home. Patient stated he does not have a PCP and would like an [facility] PCP. Patient stated he plans to return home and his daughter will provide transportation at discharge.      09/28/23 1123   Discharge Assessment   Assessment Type Discharge Planning Assessment   Confirmed/corrected address, phone number and insurance Yes   Confirmed Demographics Correct on Facesheet   Source of Information patient   If unable to respond/provide information was family/caregiver contacted? No Contact Information Available   Communicated ELODIA with patient/caregiver Date not available/Unable to determine   Reason For Admission Atrial Fibrillation   People in Home other relative(s)   Facility Arrived From: Home   Do you expect to return to your current living situation? Yes   Do you have help at home or someone to help you manage your care at home? Yes   Who are your caregiver(s) and their phone number(s)? turciosSolange (Daughter)   [phone]   Prior to hospitilization cognitive status: Alert/Oriented   Current cognitive status: Alert/Oriented   Equipment Currently Used at Home none   Readmission within 30 days? No   Do you currently have service(s) that help you manage your care at home? No   Do you take prescription medications? Yes   Do you have prescription coverage? Yes   Coverage Medicare A/B and Medicaid   Do you have any problems affording any of your prescribed medications? No   Is the patient taking medications as prescribed? yes   Who is going to help you get home at discharge? Solange turcios (Daughter)   [phone]   How do you get to doctors  appointments? family or friend will provide   Are you on dialysis? No   Do you take coumadin? No   DME Needed Upon Discharge  other (see comments)  (TBD)   Discharge Plan discussed with: Patient   Transition of Care Barriers None   Discharge Plan A Home with family   Discharge Plan B Home with family   OTHER   Name(s) of People in Home Solange turcios (Daughter)   [phone]

## 2023-09-28 NOTE — SUBJECTIVE & OBJECTIVE
Interval History: feeling better,RUQ abdominal pain is improved.    Review of Systems   HENT:  Negative for ear discharge and ear pain.    Eyes:  Negative for discharge and itching.   Endocrine: Negative for cold intolerance and heat intolerance.   Neurological:  Negative for seizures and syncope.     Objective:     Vital Signs (Most Recent):  Temp: 99 °F (37.2 °C) (09/28/23 0507)  Pulse: 76 (09/28/23 0507)  Resp: 18 (09/28/23 0507)  BP: (!) 153/75 (09/28/23 0808)  SpO2: (!) 93 % (09/28/23 0507) Vital Signs (24h Range):  Temp:  [97.7 °F (36.5 °C)-99.6 °F (37.6 °C)] 99 °F (37.2 °C)  Pulse:  [56-84] 76  Resp:  [18-44] 18  SpO2:  [92 %-99 %] 93 %  BP: (111-170)/(62-90) 153/75     Weight: 95.2 kg (209 lb 14.1 oz)  Body mass index is 31.91 kg/m².    Intake/Output Summary (Last 24 hours) at 9/28/2023 1015  Last data filed at 9/28/2023 0353  Gross per 24 hour   Intake 177.99 ml   Output 1275 ml   Net -1097.01 ml           Physical Exam  Constitutional:       Appearance: He is not toxic-appearing or diaphoretic.   HENT:      Head: Normocephalic and atraumatic.      Mouth/Throat:      Mouth: Mucous membranes are dry.      Pharynx: No oropharyngeal exudate or posterior oropharyngeal erythema.   Cardiovascular:      Rate and Rhythm: Normal rate and regular rhythm.   Pulmonary:      Effort: Pulmonary effort is normal.      Breath sounds: No wheezing.   Abdominal:      General: Bowel sounds are normal.      Palpations: Abdomen is soft.      Comments: Tender to palpation over RUQ   Musculoskeletal:         General: No deformity or signs of injury.   Skin:     General: Skin is warm and dry.   Neurological:      Mental Status: He is oriented to person, place, and time.      Cranial Nerves: No cranial nerve deficit.             Significant Labs: All pertinent labs within the past 24 hours have been reviewed.  BMP:   Recent Labs   Lab 09/28/23  0353   GLU 82      K 3.5      CO2 25   BUN 16   CREATININE 0.9   CALCIUM 8.9    MG 1.9       CBC:   Recent Labs   Lab 09/26/23  1338 09/27/23  0510 09/28/23  0353   WBC 12.83* 10.02 7.75   HGB 14.1 13.1* 12.5*   HCT 43.1 40.4 37.8*   * 120* 121*         Significant Imaging: I have reviewed all pertinent imaging results/findings within the past 24 hours.

## 2023-09-28 NOTE — ASSESSMENT & PLAN NOTE
Main complaint on presentation.  RUQ US showing cholelithiasis/sludge, no secondary findings to suggest acute cholecystitis.  The common duct is upper limits of normal in caliber.  Patient then spiked low grade fever in ER. Does have slight leukocytosis.  Abdominal CT concerning for acute cholecystitis.  Started Zosyn and consult Surgery.  Change Eliquis to Lovenox in case any procedure needed.   Surgery consulted.surgery want patient be continued  monitor while is on IV Abx,patients pain is improving,tolerating diet.

## 2023-09-28 NOTE — NURSING
[facility] [place]  Nurses Note -- 4 Eyes      9/28/2023       Skin assessed on: Q Shift      [x] No Pressure Injuries Present    [x]Prevention Measures Documented    [] Yes LDA  for Pressure Injury Previously documented     [] Yes New Pressure Injury Discovered   [] LDA for New Pressure Injury Added      Attending RN:  Kami Hilliard LPN     Second RN:  Humberto PepeRN

## 2023-09-28 NOTE — NURSING
[facility], [facility] - [place]  Nurses Note -- 4 Eyes      9/28/2023       Skin assessed on: Q Shift      [x] No Pressure Injuries Present    [x]Prevention Measures Documented    [] Yes LDA  for Pressure Injury Previously documented     [] Yes New Pressure Injury Discovered   [] LDA for New Pressure Injury Added      Attending RN:  Mariposa Pepe RN     Second eyes:  JAMIE Nieves

## 2023-09-28 NOTE — ASSESSMENT & PLAN NOTE
63 year old male with PMH of  PAF on eliquis, HTN, recent diagnosis of Hep C admitted for RUQ pain and a-fib with RVR requiring cardioversion.     Pain signficantly improved with a resolution in his white count without antiboitics. However, he is on antibiotics and continues to have RUQ tenderness. Suspect his RUQ tenderness may be due to his Hep C. Would recommend continuing antibiotics for a 7 day course. Would not operate with his new diagnosis of Hep C; if he recurs he will need drainage.

## 2023-09-28 NOTE — NURSING
Patient lying in bed watching TV without no distress noted. Patient has no complaints. See flow sheet for further assessment.

## 2023-09-29 PROBLEM — R78.81 POSITIVE BLOOD CULTURE: Status: ACTIVE | Noted: 2023-09-29

## 2023-09-29 LAB
ALBUMIN SERPL BCP-MCNC: 3.1 G/DL (ref 3.5–5.2)
ALP SERPL-CCNC: 48 U/L (ref 55–135)
ALT SERPL W/O P-5'-P-CCNC: 23 U/L (ref 10–44)
ANION GAP SERPL CALC-SCNC: 8 MMOL/L (ref 8–16)
AST SERPL-CCNC: 30 U/L (ref 10–40)
BILIRUB SERPL-MCNC: 0.7 MG/DL (ref 0.1–1)
BUN SERPL-MCNC: 14 MG/DL (ref 8–23)
CALCIUM SERPL-MCNC: 9.3 MG/DL (ref 8.7–10.5)
CHLORIDE SERPL-SCNC: 105 MMOL/L (ref 95–110)
CO2 SERPL-SCNC: 25 MMOL/L (ref 23–29)
CREAT SERPL-MCNC: 1 MG/DL (ref 0.5–1.4)
EST. GFR  (NO RACE VARIABLE): >60 ML/MIN/1.73 M^2
GLUCOSE SERPL-MCNC: 85 MG/DL (ref 70–110)
MAGNESIUM SERPL-MCNC: 1.9 MG/DL (ref 1.6–2.6)
PHOSPHATE SERPL-MCNC: 3.4 MG/DL (ref 2.7–4.5)
POTASSIUM SERPL-SCNC: 3.8 MMOL/L (ref 3.5–5.1)
PROT SERPL-MCNC: 7.1 G/DL (ref 6–8.4)
SODIUM SERPL-SCNC: 138 MMOL/L (ref 136–145)

## 2023-09-29 PROCEDURE — 63600175 PHARM REV CODE 636 W HCPCS: Performed by: HOSPITALIST

## 2023-09-29 PROCEDURE — 36415 COLL VENOUS BLD VENIPUNCTURE: CPT | Performed by: PHYSICIAN ASSISTANT

## 2023-09-29 PROCEDURE — 25000003 PHARM REV CODE 250: Performed by: HOSPITALIST

## 2023-09-29 PROCEDURE — 21400001 HC TELEMETRY ROOM

## 2023-09-29 PROCEDURE — 87040 BLOOD CULTURE FOR BACTERIA: CPT | Mod: 59 | Performed by: PHYSICIAN ASSISTANT

## 2023-09-29 PROCEDURE — 80053 COMPREHEN METABOLIC PANEL: CPT | Performed by: HOSPITALIST

## 2023-09-29 PROCEDURE — 84100 ASSAY OF PHOSPHORUS: CPT | Performed by: HOSPITALIST

## 2023-09-29 PROCEDURE — 83735 ASSAY OF MAGNESIUM: CPT | Performed by: HOSPITALIST

## 2023-09-29 RX ORDER — AMLODIPINE BESYLATE 5 MG/1
10 TABLET ORAL DAILY
Status: DISCONTINUED | OUTPATIENT
Start: 2023-09-29 | End: 2023-09-30 | Stop reason: HOSPADM

## 2023-09-29 RX ORDER — LABETALOL 100 MG/1
200 TABLET, FILM COATED ORAL EVERY 12 HOURS
Status: DISCONTINUED | OUTPATIENT
Start: 2023-09-29 | End: 2023-09-30 | Stop reason: HOSPADM

## 2023-09-29 RX ORDER — CLONIDINE HYDROCHLORIDE 0.1 MG/1
0.2 TABLET ORAL EVERY 4 HOURS PRN
Status: DISCONTINUED | OUTPATIENT
Start: 2023-09-29 | End: 2023-09-30 | Stop reason: HOSPADM

## 2023-09-29 RX ADMIN — ENOXAPARIN SODIUM 100 MG: 100 INJECTION SUBCUTANEOUS at 02:09

## 2023-09-29 RX ADMIN — MUPIROCIN: 20 OINTMENT TOPICAL at 08:09

## 2023-09-29 RX ADMIN — ATORVASTATIN CALCIUM 40 MG: 40 TABLET, FILM COATED ORAL at 08:09

## 2023-09-29 RX ADMIN — LABETALOL HYDROCHLORIDE 200 MG: 100 TABLET, FILM COATED ORAL at 08:09

## 2023-09-29 RX ADMIN — MUPIROCIN: 20 OINTMENT TOPICAL at 09:09

## 2023-09-29 RX ADMIN — VANCOMYCIN HYDROCHLORIDE 1500 MG: 1.5 INJECTION, POWDER, LYOPHILIZED, FOR SOLUTION INTRAVENOUS at 08:09

## 2023-09-29 RX ADMIN — FLECAINIDE ACETATE 100 MG: 50 TABLET ORAL at 08:09

## 2023-09-29 RX ADMIN — PIPERACILLIN SODIUM AND TAZOBACTAM SODIUM 4.5 G: 4; .5 INJECTION, POWDER, LYOPHILIZED, FOR SOLUTION INTRAVENOUS at 10:09

## 2023-09-29 RX ADMIN — ALLOPURINOL 100 MG: 100 TABLET ORAL at 08:09

## 2023-09-29 RX ADMIN — PIPERACILLIN SODIUM AND TAZOBACTAM SODIUM 4.5 G: 4; .5 INJECTION, POWDER, LYOPHILIZED, FOR SOLUTION INTRAVENOUS at 08:09

## 2023-09-29 RX ADMIN — FAMOTIDINE 20 MG: 20 TABLET ORAL at 08:09

## 2023-09-29 RX ADMIN — AMLODIPINE BESYLATE 10 MG: 5 TABLET ORAL at 08:09

## 2023-09-29 RX ADMIN — PIPERACILLIN SODIUM AND TAZOBACTAM SODIUM 4.5 G: 4; .5 INJECTION, POWDER, LYOPHILIZED, FOR SOLUTION INTRAVENOUS at 02:09

## 2023-09-29 RX ADMIN — VANCOMYCIN HYDROCHLORIDE 1500 MG: 1.5 INJECTION, POWDER, LYOPHILIZED, FOR SOLUTION INTRAVENOUS at 10:09

## 2023-09-29 RX ADMIN — ASPIRIN 81 MG CHEWABLE TABLET 81 MG: 81 TABLET CHEWABLE at 08:09

## 2023-09-29 NOTE — PROGRESS NOTES
[facility]  General Surgery  Progress Note    Subjective:     History of Present Illness:  Vivek Whitman is a 63 year old male with  PAF on eliquis, HTN, recent diagnosis of Hep C who presented for intermittent RUQ pain that started yesterday. Initially was a 9/10, it has improved today. He denies nausea, vomiting, changes in bowel habits, fevers or chills. On admission, he was noted to be in a-fib with RVR and underwent a cardioversion today. He is now in NSR. He is currently on eliquis.     Labs with resolved leukocytosis without antibiotics, normal LFTs and tbili. CT scan and US without signs of acute cholecystitis, however gallstones are present.       Post-Op Info:  Procedure(s) (LRB):  Cardioversion or Defibrillation (N/A)  Cardioversion (N/A)   2 Days Post-Op     Interval History: No acute events overnight. HDS on RA. Pain continues to improve.     Medications:  Continuous Infusions:  Scheduled Meds:   allopurinoL  100 mg Oral Daily    amLODIPine  10 mg Oral Daily    aspirin  81 mg Oral Daily    atorvastatin  40 mg Oral Daily    enoxparin  1 mg/kg Subcutaneous Q12H (treatment, non-standard time)    famotidine  20 mg Oral Daily    flecainide  100 mg Oral Q12H    labetaloL  200 mg Oral Q12H    mupirocin   Nasal BID    nicotine  1 patch Transdermal Daily    piperacillin-tazobactam (Zosyn) IV (PEDS and ADULTS) (extended infusion is not appropriate)  4.5 g Intravenous Q8H    vancomycin (VANCOCIN) IV (PEDS and ADULTS)  1,500 mg Intravenous Q12H     PRN Meds:acetaminophen, cloNIDine, melatonin, morphine, ondansetron, oxyCODONE-acetaminophen, polyethylene glycol, sodium chloride 0.9%, Pharmacy to dose Vancomycin consult **AND** vancomycin - pharmacy to dose     Review of patient's allergies indicates:  No Known Allergies  Objective:     Vital Signs (Most Recent):  Temp: 98.3 °F (36.8 °C) (09/29/23 0714)  Pulse: 72 (09/29/23 0746)  Resp: 18 (09/29/23 0714)  BP: (!) 196/84 (09/29/23 0746)  SpO2:  (!) 92 % (09/29/23 0714) Vital Signs (24h Range):  Temp:  [98.3 °F (36.8 °C)-99 °F (37.2 °C)] 98.3 °F (36.8 °C)  Pulse:  [65-72] 72  Resp:  [16-20] 18  SpO2:  [92 %-96 %] 92 %  BP: (139-197)/(72-84) 196/84     Weight: 95.2 kg (209 lb 14.1 oz)  Body mass index is 31.91 kg/m².    Intake/Output - Last 3 Shifts         09/27 0700 09/28 0659 09/28 0700 09/29 0659 09/29 0700 09/30 0659    P.O.  600     I.V. (mL/kg) 28.6 (0.3)      IV Piggyback 149.4      Total Intake(mL/kg) 178 (1.9) 600 (6.3)     Urine (mL/kg/hr) 1575 (0.7) 400 (0.2)     Total Output 1575 400     Net -1397 +200            Stool Occurrence  1 x              Physical Exam  Vitals reviewed.   Constitutional:       General: He is not in acute distress.  HENT:      Head: Normocephalic and atraumatic.      Mouth/Throat:      Mouth: Mucous membranes are moist.   Cardiovascular:      Rate and Rhythm: Normal rate.   Pulmonary:      Effort: Pulmonary effort is normal. No respiratory distress.   Abdominal:      General: There is no distension.      Palpations: Abdomen is soft.      Tenderness: There is no abdominal tenderness. There is no guarding or rebound.   Skin:     General: Skin is warm and dry.   Neurological:      General: No focal deficit present.      Mental Status: He is alert and oriented to person, place, and time.          Significant Labs:  I have reviewed all pertinent lab results within the past 24 hours.  CBC:   Recent Labs   Lab 09/28/23  0353   WBC 7.75   RBC 3.83*   HGB 12.5*   HCT 37.8*   *   MCV 99*   MCH 32.6*   MCHC 33.1     CMP:   Recent Labs   Lab 09/29/23  0556   GLU 85   CALCIUM 9.3   ALBUMIN 3.1*   PROT 7.1      K 3.8   CO2 25      BUN 14   CREATININE 1.0   ALKPHOS 48*   ALT 23   AST 30   BILITOT 0.7       Significant Diagnostics:  I have reviewed all pertinent imaging results/findings within the past 24 hours.    Assessment/Plan:     Right upper quadrant abdominal pain  63 year old male with PMH of  PAF on  eliquis, HTN, recent diagnosis of Hep C admitted for RUQ pain and a-fib with RVR requiring cardioversion.     Pain signficantly improved with a resolution in his white count without antiboitics. However, he is on antibiotics and continues to have RUQ tenderness. Suspect his RUQ tenderness may be due to his Hep C. Would recommend continuing antibiotics for a 7 day course. Would not operate with his new diagnosis of Hep C; if he recurs he will need drainage.         Marylu Olmedo MD  General Surgery  [facility] [place] - [facility]

## 2023-09-29 NOTE — ASSESSMENT & PLAN NOTE
Patient with Paroxysmal (<7 days) atrial fibrillation which is uncontrolled currently with Beta Blocker. Patient is currently in atrial fibrillation.[id number] Score: 1. . Anticoagulation indicated. Anticoagulation done with Eliquis.  Noted to be in Afib with RVR on presentation.  Did not respond to Cardizem pushes and placed on Cardizem drip.  Cardiology consulted  Resume home Metoprolol and Flecainide.  On Eliquis.  S/P successful cardioversion,sinus.

## 2023-09-29 NOTE — PROGRESS NOTES
Pharmacokinetic Initial Assessment: IV Vancomycin    Assessment/Plan:    Initiate intravenous vancomycin with loading dose of 2250 mg once followed by a maintenance dose of vancomycin 1500 mg IV every 12 hours  Desired empiric serum trough concentration is 10 to 20 mcg/mL  Draw vancomycin trough level 60 min prior to fourth dose on 9/30/23 at approximately 0800  Pharmacy will continue to follow and monitor vancomycin.      Please contact pharmacy at extension [phone] with any questions regarding this assessment.     Thank you for the consult,   Cody Steiner       Patient brief summary:  Vivek Whitman is a 63 y.o. male initiated on antimicrobial therapy with IV Vancomycin for treatment of suspected bacteremia    Drug Allergies:   Review of patient's allergies indicates:  No Known Allergies    Actual Body Weight:   95.2 kg    Renal Function:   Estimated Creatinine Clearance: 91.6 mL/min (based on SCr of 0.9 mg/dL).,     Dialysis Method (if applicable):  N/A    CBC (last 72 hours):  Recent Labs   Lab Result Units 09/26/23  1338 09/27/23  0510 09/28/23  0353   WBC K/uL 12.83* 10.02 7.75   Hemoglobin g/dL 14.1 13.1* 12.5*   Hematocrit % 43.1 40.4 37.8*   Platelets K/uL 149* 120* 121*   Gran % % 82.2* 65.3 59.4   Lymph % % 10.7* 22.2 26.5   Mono % % 6.2 11.1 11.5   Eosinophil % % 0.2 0.8 1.9   Basophil % % 0.2 0.4 0.6   Differential Method  Automated Automated Automated       Metabolic Panel (last 72 hours):  Recent Labs   Lab Result Units 09/26/23  1338 09/26/23  1510 09/27/23  0510 09/28/23  0353   Sodium mmol/L 134*  --  137 136   Potassium mmol/L 4.0  --  3.6 3.5   Chloride mmol/L 101  --  104 104   CO2 mmol/L 23  --  25 25   Glucose mg/dL 164*  --  94 82   Glucose, UA   --  Trace*  --   --    BUN mg/dL 17  --  17 16   Creatinine mg/dL 1.1  --  0.9 0.9   Creatinine, Urine mg/dL  --  231.5  --   --    Albumin g/dL 3.3*  --  2.9* 2.8*   Total Bilirubin mg/dL 0.9  --  1.2* 0.8   Alkaline Phosphatase U/L 56  --  41*  "46*   AST U/L 25  --  22 24   ALT U/L 25  --  20 21   Magnesium mg/dL 1.4*  --  2.0 1.9   Phosphorus mg/dL 3.5  --  2.6* 2.6*       Drug levels (last 3 results):  No results for input(s): "VANCOMYCINRA", "VANCORANDOM", "VANCOMYCINPE", "VANCOPEAK", "VANCOMYCINTR", "VANCOTROUGH" in the last 72 hours.    Microbiologic Results:  Microbiology Results (last 7 days)       Procedure Component Value Units Date/Time    Blood Culture #2 **CANNOT BE ORDERED STAT** [[id number]] Collected: 09/26/23 1818    Order Status: Completed Specimen: Blood from Hand, Right Updated: 09/28/23 2035     Blood Culture, Routine Gram stain aer bottle: Gram positive cocci in clusters resembling Staph      Results called to and read back by:Jenny STEWART 09/28/2023  20:28    Blood Culture #1 **CANNOT BE ORDERED STAT** [[id number]] Collected: 09/26/23 1818    Order Status: Completed Specimen: Blood from Forearm, Right Updated: 09/28/23 1903     Blood Culture, Routine No Growth to date      No Growth to date      No Growth to date            "

## 2023-09-29 NOTE — SUBJECTIVE & OBJECTIVE
Interval History: feeling better,RUQ abdominal pain is improved.    Review of Systems   HENT:  Negative for ear discharge and ear pain.    Eyes:  Negative for discharge and itching.   Endocrine: Negative for cold intolerance and heat intolerance.   Neurological:  Negative for seizures and syncope.     Objective:     Vital Signs (Most Recent):  Temp: 98.3 °F (36.8 °C) (09/29/23 0714)  Pulse: 70 (09/29/23 0925)  Resp: 18 (09/29/23 0714)  BP: 134/76 (09/29/23 0925)  SpO2: (!) 92 % (09/29/23 0714) Vital Signs (24h Range):  Temp:  [98.3 °F (36.8 °C)-99 °F (37.2 °C)] 98.3 °F (36.8 °C)  Pulse:  [65-72] 70  Resp:  [16-20] 18  SpO2:  [92 %-96 %] 92 %  BP: (134-197)/(72-84) 134/76     Weight: 95.2 kg (209 lb 14.1 oz)  Body mass index is 31.91 kg/m².    Intake/Output Summary (Last 24 hours) at 9/29/2023 0959  Last data filed at 9/28/2023 1841  Gross per 24 hour   Intake 480 ml   Output 400 ml   Net 80 ml           Physical Exam  Constitutional:       Appearance: He is not toxic-appearing or diaphoretic.   HENT:      Head: Normocephalic and atraumatic.      Mouth/Throat:      Mouth: Mucous membranes are dry.      Pharynx: No oropharyngeal exudate or posterior oropharyngeal erythema.   Cardiovascular:      Rate and Rhythm: Normal rate and regular rhythm.   Pulmonary:      Effort: Pulmonary effort is normal.      Breath sounds: No wheezing.   Abdominal:      General: Bowel sounds are normal.      Palpations: Abdomen is soft.      Comments: Tender to palpation over RUQ   Musculoskeletal:         General: No deformity or signs of injury.   Skin:     General: Skin is warm and dry.   Neurological:      Mental Status: He is oriented to person, place, and time.      Cranial Nerves: No cranial nerve deficit.             Significant Labs: All pertinent labs within the past 24 hours have been reviewed.  BMP:   Recent Labs   Lab 09/29/23  0556   GLU 85      K 3.8      CO2 25   BUN 14   CREATININE 1.0   CALCIUM 9.3   MG 1.9        CBC:   Recent Labs   Lab 09/28/23  0353   WBC 7.75   HGB 12.5*   HCT 37.8*   *         Significant Imaging: I have reviewed all pertinent imaging results/findings within the past 24 hours.

## 2023-09-29 NOTE — PT/OT/SLP PROGRESS
"Occupational Therapy      Patient Name:  Vivek Whitman   MRN:  [id number]    Patient not seen today secondary to: pt refusing. Initially, pt was calm and agreeable to OT evaluation and to be OOB>chair for lunch. Pt sat EOB with MOD I. When edu on importance of shoes vs non-skid socks as per hospital policy and pt safety, pt then began to curse at OT and reporting that OT is "making" him do things. I edu pt on his right to refuse eval and importance of his consent for treatment. Pt continued to curse and raise his voice so OT prompted pt to lay back down and evaluation will not be completed. Pt refused OT therapy services in the future. Please refer to PT eval on 09/28/29 for information. Bed alarm turned on, lights on, tray table within reach. NurseTEENA, updated. OT to sign off.     9/29/2023  "

## 2023-09-29 NOTE — NURSING
Bedside Report given to night nurse GAURANG Monge. Walking rounds completed. Visualized and assessed patient NAD noted. Safety precautions maintained and call light within reach.     Chart check completed.

## 2023-09-29 NOTE — NURSING
[facility], [facility] - [place]  Nurses Note -- 4 Eyes      9/28/2023       Skin assessed on: Q Shift      [x] No Pressure Injuries Present    [x]Prevention Measures Documented    [] Yes LDA  for Pressure Injury Previously documented     [] Yes New Pressure Injury Discovered   [] LDA for New Pressure Injury Added      Attending RN:  Mariposa Pepe RN     Second RN:  Kami TALLEY LPN

## 2023-09-29 NOTE — SUBJECTIVE & OBJECTIVE
Interval History: No acute events overnight. HDS on RA. Pain continues to improve.     Medications:  Continuous Infusions:  Scheduled Meds:   allopurinoL  100 mg Oral Daily    amLODIPine  10 mg Oral Daily    aspirin  81 mg Oral Daily    atorvastatin  40 mg Oral Daily    enoxparin  1 mg/kg Subcutaneous Q12H (treatment, non-standard time)    famotidine  20 mg Oral Daily    flecainide  100 mg Oral Q12H    labetaloL  200 mg Oral Q12H    mupirocin   Nasal BID    nicotine  1 patch Transdermal Daily    piperacillin-tazobactam (Zosyn) IV (PEDS and ADULTS) (extended infusion is not appropriate)  4.5 g Intravenous Q8H    vancomycin (VANCOCIN) IV (PEDS and ADULTS)  1,500 mg Intravenous Q12H     PRN Meds:acetaminophen, cloNIDine, melatonin, morphine, ondansetron, oxyCODONE-acetaminophen, polyethylene glycol, sodium chloride 0.9%, Pharmacy to dose Vancomycin consult **AND** vancomycin - pharmacy to dose     Review of patient's allergies indicates:  No Known Allergies  Objective:     Vital Signs (Most Recent):  Temp: 98.3 °F (36.8 °C) (09/29/23 0714)  Pulse: 72 (09/29/23 0746)  Resp: 18 (09/29/23 0714)  BP: (!) 196/84 (09/29/23 0746)  SpO2: (!) 92 % (09/29/23 0714) Vital Signs (24h Range):  Temp:  [98.3 °F (36.8 °C)-99 °F (37.2 °C)] 98.3 °F (36.8 °C)  Pulse:  [65-72] 72  Resp:  [16-20] 18  SpO2:  [92 %-96 %] 92 %  BP: (139-197)/(72-84) 196/84     Weight: 95.2 kg (209 lb 14.1 oz)  Body mass index is 31.91 kg/m².    Intake/Output - Last 3 Shifts         09/27 0700 09/28 0659 09/28 0700 09/29 0659 09/29 0700 09/30 0659    P.O.  600     I.V. (mL/kg) 28.6 (0.3)      IV Piggyback 149.4      Total Intake(mL/kg) 178 (1.9) 600 (6.3)     Urine (mL/kg/hr) 1575 (0.7) 400 (0.2)     Total Output 1575 400     Net -1397 +200            Stool Occurrence  1 x              Physical Exam  Vitals reviewed.   Constitutional:       General: He is not in acute distress.  HENT:      Head: Normocephalic and atraumatic.      Mouth/Throat:      Mouth:  Mucous membranes are moist.   Cardiovascular:      Rate and Rhythm: Normal rate.   Pulmonary:      Effort: Pulmonary effort is normal. No respiratory distress.   Abdominal:      General: There is no distension.      Palpations: Abdomen is soft.      Tenderness: There is no abdominal tenderness. There is no guarding or rebound.   Skin:     General: Skin is warm and dry.   Neurological:      General: No focal deficit present.      Mental Status: He is alert and oriented to person, place, and time.          Significant Labs:  I have reviewed all pertinent lab results within the past 24 hours.  CBC:   Recent Labs   Lab 09/28/23  0353   WBC 7.75   RBC 3.83*   HGB 12.5*   HCT 37.8*   *   MCV 99*   MCH 32.6*   MCHC 33.1     CMP:   Recent Labs   Lab 09/29/23  0556   GLU 85   CALCIUM 9.3   ALBUMIN 3.1*   PROT 7.1      K 3.8   CO2 25      BUN 14   CREATININE 1.0   ALKPHOS 48*   ALT 23   AST 30   BILITOT 0.7       Significant Diagnostics:  I have reviewed all pertinent imaging results/findings within the past 24 hours.

## 2023-09-29 NOTE — PROGRESS NOTES
Vancomycin consult follow-up:    Patient reviewed, renal function stable, no new levels, continue current therapy; Next levels due: trough due 9/30/2023 at 0800

## 2023-09-29 NOTE — ASSESSMENT & PLAN NOTE
RUQ US just showing gallstones.  Patient with slight leukocytosis and low grade fever in ER.  CT ordered showing possible acute cholecystitis.  Surgery consulted.surgery want patient be continued  monitor while is on IV Abx,surgery not convinced is really cholecystitis,patients pain is improving,tolerating diet.augmentin at DC time.

## 2023-09-29 NOTE — PLAN OF CARE
Problem: Infection Progression (Sepsis/Septic Shock)  Goal: Absence of Infection Signs and Symptoms  Outcome: Ongoing, Progressing  Intervention: Initiate Sepsis Management  Flowsheets (Taken 9/29/2023 0559)  Infection Prevention: single patient room provided  Infection Management: aseptic technique maintained  Isolation Precautions: precautions maintained  Intervention: Promote Recovery  Flowsheets (Taken 9/29/2023 0559)  Activity Management: Rolling - L1     Problem: Dysrhythmia  Goal: Normalized Cardiac Rhythm  Outcome: Ongoing, Progressing  Intervention: Monitor and Manage Cardiac Rhythm Effect  Flowsheets (Taken 9/29/2023 0559)  Dysrhythmia Management: (medication) other (see comments)

## 2023-09-29 NOTE — PLAN OF CARE
Notified of blood culture result positive for cocci resembling staph. Currently on zosyn.   Will add vancomycin pending final speciation, repeat cultures for 9/29.    Yobany Purvis PA-C  Department of Hospital Medicine   [facility]

## 2023-09-29 NOTE — PROGRESS NOTES
[facility] Medicine  Progress Note    Patient Name: Vivek Whitman  MRN: [id number]  Patient Class: IP- Inpatient   Admission Date: 9/26/2023  Length of Stay: 3 days  Attending Physician: Wen Alatorre, *  Primary Care Provider: Racheal, Primary Doctor        Subjective:     Principal Problem:Paroxysmal atrial fibrillation        HPI:  62 y/o M with PMHx significant for PAF on eliquis, HTN, recent diagnosis of Hep C presents to the ER complaining of abdominal pain.  Patinet reports RUQ abdominal pain that started last night.  Pain is intermittent.  Rates pain 9/10 in severity, worsening when driving over bumps on the road.  Also reports chronic dyspnea and BLE swelling.  Hx of ETOH abuse, but denies any recent use.  No alleviating or aggravating factors.  Denies any nausea, vomiting or changes in BM associated with abdominal pain.  He presented to ER where he was noted to be in rapid Afib.  He also spiked a low grade fever in ER.  Denies any other complaints.      Overview/Hospital Course:  62 y/o male with PAF presented with RUQ abdominal pain.  Noted to be in Afib with RVR and admitted to ICU on Cardizem drip.  Cardiology consulted. S/P DVCCV, converted to sinus.  RUQ US just showing gallstones.  Patient with slight leukocytosis and low grade fever in ER.  CT ordered showing possible acute cholecystitis.  Surgery consulted.surgery want patient be continued  monitor while is on IV Abx,surgery not convinced is really cholecystitis,patients pain is improving,tolerating diet.  Has staph on blood culture,may contaminated,blood culture is repeated,on Vanc.  BP is uncontrolled,added more BP med's  with prn clonidine.      Interval History: feeling better,RUQ abdominal pain is improved.    Review of Systems   HENT:  Negative for ear discharge and ear pain.    Eyes:  Negative for discharge and itching.   Endocrine: Negative for cold intolerance and heat intolerance.   Neurological:  Negative for  seizures and syncope.     Objective:     Vital Signs (Most Recent):  Temp: 98.3 °F (36.8 °C) (09/29/23 0714)  Pulse: 70 (09/29/23 0925)  Resp: 18 (09/29/23 0714)  BP: 134/76 (09/29/23 0925)  SpO2: (!) 92 % (09/29/23 0714) Vital Signs (24h Range):  Temp:  [98.3 °F (36.8 °C)-99 °F (37.2 °C)] 98.3 °F (36.8 °C)  Pulse:  [65-72] 70  Resp:  [16-20] 18  SpO2:  [92 %-96 %] 92 %  BP: (134-197)/(72-84) 134/76     Weight: 95.2 kg (209 lb 14.1 oz)  Body mass index is 31.91 kg/m².    Intake/Output Summary (Last 24 hours) at 9/29/2023 0959  Last data filed at 9/28/2023 1841  Gross per 24 hour   Intake 480 ml   Output 400 ml   Net 80 ml           Physical Exam  Constitutional:       Appearance: He is not toxic-appearing or diaphoretic.   HENT:      Head: Normocephalic and atraumatic.      Mouth/Throat:      Mouth: Mucous membranes are dry.      Pharynx: No oropharyngeal exudate or posterior oropharyngeal erythema.   Cardiovascular:      Rate and Rhythm: Normal rate and regular rhythm.   Pulmonary:      Effort: Pulmonary effort is normal.      Breath sounds: No wheezing.   Abdominal:      General: Bowel sounds are normal.      Palpations: Abdomen is soft.      Comments: Tender to palpation over RUQ   Musculoskeletal:         General: No deformity or signs of injury.   Skin:     General: Skin is warm and dry.   Neurological:      Mental Status: He is oriented to person, place, and time.      Cranial Nerves: No cranial nerve deficit.             Significant Labs: All pertinent labs within the past 24 hours have been reviewed.  BMP:   Recent Labs   Lab 09/29/23  0556   GLU 85      K 3.8      CO2 25   BUN 14   CREATININE 1.0   CALCIUM 9.3   MG 1.9       CBC:   Recent Labs   Lab 09/28/23  0353   WBC 7.75   HGB 12.5*   HCT 37.8*   *         Significant Imaging: I have reviewed all pertinent imaging results/findings within the past 24 hours.      Assessment/Plan:      * Paroxysmal atrial fibrillation  Patient with  Paroxysmal (<7 days) atrial fibrillation which is uncontrolled currently with Beta Blocker. Patient is currently in atrial fibrillation.[id number] Score: 1. . Anticoagulation indicated. Anticoagulation done with Eliquis.  Noted to be in Afib with RVR on presentation.  Did not respond to Cardizem pushes and placed on Cardizem drip.  Cardiology consulted  Resume home Metoprolol and Flecainide.  On Eliquis.  S/P successful cardioversion,sinus.    Positive blood culture      Has staph on blood culture,may contaminated,blood culture is repeated,on Vanc.      Calculus of gallbladder with acute cholecystitis without obstruction      RUQ US just showing gallstones.  Patient with slight leukocytosis and low grade fever in ER.  CT ordered showing possible acute cholecystitis.  Surgery consulted.surgery want patient be continued  monitor while is on IV Abx,surgery not convinced is really cholecystitis,patients pain is improving,tolerating diet.augmentin at DC time.      Benign essential hypertension  Resume home medications with parameters.  uncontrolled  added more BP med;s     Right upper quadrant abdominal pain  Main complaint on presentation.  RUQ US showing cholelithiasis/sludge, no secondary findings to suggest acute cholecystitis.  The common duct is upper limits of normal in caliber.  Patient then spiked low grade fever in ER. Does have slight leukocytosis.  Abdominal CT concerning for acute cholecystitis.  Started Zosyn and consult Surgery.  Change Eliquis to Lovenox in case any procedure needed.   Surgery consulted.surgery want patient be continued  monitor while is on IV Abx,patients pain is improving,tolerating diet.    Class 1 obesity with body mass index (BMI) of 31.0 to 31.9 in adult  Body mass index is 32.08 kg/m². Morbid obesity complicates all aspects of disease management from diagnostic modalities to treatment. Weight loss encouraged and health benefits explained to patient.         Tobacco abuse  Spent  more than 3 minutes on smoking cessation counseling.  Provide nicotine patch.        VTE Risk Mitigation (From admission, onward)         Ordered     enoxaparin injection 100 mg  Every 12 hours         09/27/23 1422     IP VTE HIGH RISK PATIENT  Once         09/26/23 1646     Place sequential compression device  Until discontinued         09/26/23 1646                Discharge Planning   ELODIA:      Code Status: Full Code   Is the patient medically ready for discharge?:     Reason for patient still in hospital (select all that apply): Patient trending condition  Discharge Plan A: Home with family                  Wen Alatorre MD  Department of Hospital Medicine   [facility]

## 2023-09-29 NOTE — PLAN OF CARE
Plan of care reviewed with patient.  Patient verbalized understanding and had no further questions.  Patient continues to receive IV fluids throughout the shift.  Patient still has mild abdominal pain today.  Patient now resting comfortably in bed locked in lowest position, side rails up x3, and call bell in reach.  Will continue to monitor.       Problem: Adult Inpatient Plan of Care  Goal: Plan of Care Review  Outcome: Ongoing, Progressing  Goal: Patient-Specific Goal (Individualized)  Outcome: Ongoing, Progressing  Goal: Absence of Hospital-Acquired Illness or Injury  Outcome: Ongoing, Progressing  Goal: Optimal Comfort and Wellbeing  Outcome: Ongoing, Progressing  Goal: Readiness for Transition of Care  Outcome: Ongoing, Progressing     Problem: Adjustment to Illness (Sepsis/Septic Shock)  Goal: Optimal Coping  Outcome: Ongoing, Progressing     Problem: Bleeding (Sepsis/Septic Shock)  Goal: Absence of Bleeding  Outcome: Ongoing, Progressing     Problem: Glycemic Control Impaired (Sepsis/Septic Shock)  Goal: Blood Glucose Level Within Desired Range  Outcome: Ongoing, Progressing     Problem: Infection Progression (Sepsis/Septic Shock)  Goal: Absence of Infection Signs and Symptoms  Outcome: Ongoing, Progressing     Problem: Nutrition Impaired (Sepsis/Septic Shock)  Goal: Optimal Nutrition Intake  Outcome: Ongoing, Progressing     Problem: Impaired Wound Healing  Goal: Optimal Wound Healing  Outcome: Ongoing, Progressing     Problem: Dysrhythmia  Goal: Normalized Cardiac Rhythm  Outcome: Ongoing, Progressing

## 2023-09-30 VITALS
DIASTOLIC BLOOD PRESSURE: 58 MMHG | RESPIRATION RATE: 20 BRPM | BODY MASS INDEX: 31.81 KG/M2 | TEMPERATURE: 98 F | SYSTOLIC BLOOD PRESSURE: 118 MMHG | OXYGEN SATURATION: 93 % | HEIGHT: 68 IN | HEART RATE: 63 BPM | WEIGHT: 209.88 LBS

## 2023-09-30 LAB
ALBUMIN SERPL BCP-MCNC: 2.8 G/DL (ref 3.5–5.2)
ALP SERPL-CCNC: 54 U/L (ref 55–135)
ALT SERPL W/O P-5'-P-CCNC: 22 U/L (ref 10–44)
ANION GAP SERPL CALC-SCNC: 6 MMOL/L (ref 8–16)
AST SERPL-CCNC: 26 U/L (ref 10–40)
BACTERIA BLD CULT: ABNORMAL
BACTERIA BLD CULT: NORMAL
BASOPHILS # BLD AUTO: 0.05 K/UL (ref 0–0.2)
BASOPHILS NFR BLD: 0.7 % (ref 0–1.9)
BILIRUB SERPL-MCNC: 0.5 MG/DL (ref 0.1–1)
BUN SERPL-MCNC: 11 MG/DL (ref 8–23)
CALCIUM SERPL-MCNC: 8.9 MG/DL (ref 8.7–10.5)
CHLORIDE SERPL-SCNC: 106 MMOL/L (ref 95–110)
CO2 SERPL-SCNC: 26 MMOL/L (ref 23–29)
CREAT SERPL-MCNC: 1 MG/DL (ref 0.5–1.4)
DIFFERENTIAL METHOD: ABNORMAL
EOSINOPHIL # BLD AUTO: 0.3 K/UL (ref 0–0.5)
EOSINOPHIL NFR BLD: 4.8 % (ref 0–8)
ERYTHROCYTE [DISTWIDTH] IN BLOOD BY AUTOMATED COUNT: 16.2 % (ref 11.5–14.5)
EST. GFR  (NO RACE VARIABLE): >60 ML/MIN/1.73 M^2
GLUCOSE SERPL-MCNC: 84 MG/DL (ref 70–110)
HCT VFR BLD AUTO: 37.4 % (ref 40–54)
HGB BLD-MCNC: 11.9 G/DL (ref 14–18)
IMM GRANULOCYTES # BLD AUTO: 0.02 K/UL (ref 0–0.04)
IMM GRANULOCYTES NFR BLD AUTO: 0.3 % (ref 0–0.5)
LYMPHOCYTES # BLD AUTO: 2.4 K/UL (ref 1–4.8)
LYMPHOCYTES NFR BLD: 34.1 % (ref 18–48)
MAGNESIUM SERPL-MCNC: 1.8 MG/DL (ref 1.6–2.6)
MCH RBC QN AUTO: 31.8 PG (ref 27–31)
MCHC RBC AUTO-ENTMCNC: 31.8 G/DL (ref 32–36)
MCV RBC AUTO: 100 FL (ref 82–98)
MONOCYTES # BLD AUTO: 0.7 K/UL (ref 0.3–1)
MONOCYTES NFR BLD: 10.3 % (ref 4–15)
NEUTROPHILS # BLD AUTO: 3.4 K/UL (ref 1.8–7.7)
NEUTROPHILS NFR BLD: 49.8 % (ref 38–73)
NRBC BLD-RTO: 0 /100 WBC
PHOSPHATE SERPL-MCNC: 4 MG/DL (ref 2.7–4.5)
PLATELET # BLD AUTO: 144 K/UL (ref 150–450)
PMV BLD AUTO: 10.3 FL (ref 9.2–12.9)
POTASSIUM SERPL-SCNC: 3.6 MMOL/L (ref 3.5–5.1)
PROT SERPL-MCNC: 6.4 G/DL (ref 6–8.4)
RBC # BLD AUTO: 3.74 M/UL (ref 4.6–6.2)
SODIUM SERPL-SCNC: 138 MMOL/L (ref 136–145)
VANCOMYCIN TROUGH SERPL-MCNC: 22.6 UG/ML (ref 10–22)
WBC # BLD AUTO: 6.9 K/UL (ref 3.9–12.7)

## 2023-09-30 PROCEDURE — 36415 COLL VENOUS BLD VENIPUNCTURE: CPT | Performed by: HOSPITALIST

## 2023-09-30 PROCEDURE — 25000003 PHARM REV CODE 250: Performed by: HOSPITALIST

## 2023-09-30 PROCEDURE — 80202 ASSAY OF VANCOMYCIN: CPT | Performed by: HOSPITALIST

## 2023-09-30 PROCEDURE — 83735 ASSAY OF MAGNESIUM: CPT | Performed by: HOSPITALIST

## 2023-09-30 PROCEDURE — 63600175 PHARM REV CODE 636 W HCPCS: Performed by: HOSPITALIST

## 2023-09-30 PROCEDURE — 85025 COMPLETE CBC W/AUTO DIFF WBC: CPT | Performed by: HOSPITALIST

## 2023-09-30 PROCEDURE — 80053 COMPREHEN METABOLIC PANEL: CPT | Performed by: HOSPITALIST

## 2023-09-30 PROCEDURE — 84100 ASSAY OF PHOSPHORUS: CPT | Performed by: HOSPITALIST

## 2023-09-30 RX ORDER — AMOXICILLIN AND CLAVULANATE POTASSIUM 875; 125 MG/1; MG/1
1 TABLET, FILM COATED ORAL 2 TIMES DAILY
Qty: 14 TABLET | Refills: 0 | Status: SHIPPED | OUTPATIENT
Start: 2023-09-30 | End: 2023-10-07

## 2023-09-30 RX ORDER — AMLODIPINE BESYLATE 10 MG/1
10 TABLET ORAL DAILY
Qty: 30 TABLET | Refills: 5 | Status: SHIPPED | OUTPATIENT
Start: 2023-09-30 | End: 2023-10-20 | Stop reason: SDUPTHER

## 2023-09-30 RX ORDER — LABETALOL 200 MG/1
200 TABLET, FILM COATED ORAL EVERY 12 HOURS
Qty: 60 TABLET | Refills: 5 | Status: SHIPPED | OUTPATIENT
Start: 2023-09-30 | End: 2023-12-11 | Stop reason: SDUPTHER

## 2023-09-30 RX ADMIN — FAMOTIDINE 20 MG: 20 TABLET ORAL at 09:09

## 2023-09-30 RX ADMIN — LABETALOL HYDROCHLORIDE 200 MG: 100 TABLET, FILM COATED ORAL at 09:09

## 2023-09-30 RX ADMIN — ASPIRIN 81 MG CHEWABLE TABLET 81 MG: 81 TABLET CHEWABLE at 09:09

## 2023-09-30 RX ADMIN — AMLODIPINE BESYLATE 10 MG: 5 TABLET ORAL at 09:09

## 2023-09-30 RX ADMIN — FLECAINIDE ACETATE 100 MG: 50 TABLET ORAL at 09:09

## 2023-09-30 RX ADMIN — ALLOPURINOL 100 MG: 100 TABLET ORAL at 09:09

## 2023-09-30 RX ADMIN — ENOXAPARIN SODIUM 100 MG: 100 INJECTION SUBCUTANEOUS at 05:09

## 2023-09-30 RX ADMIN — ATORVASTATIN CALCIUM 40 MG: 40 TABLET, FILM COATED ORAL at 09:09

## 2023-09-30 NOTE — PLAN OF CARE
[facility] - Telemetry  Discharge Final Note    Primary Care Provider: No, Primary Doctor    Expected Discharge Date: 9/30/2023    Final Discharge Note (most recent)       Final Note - 09/30/23 0912          Final Note    Assessment Type Final Discharge Note     Anticipated Discharge Disposition Home or Self Care     What phone number can be called within the next 1-3 days to see how you are doing after discharge? [phone]     Hospital Resources/Appts/Education Provided Appointments scheduled and added to AVS        Post-Acute Status    Coverage Medicare A/B     Discharge Delays None known at this time                     Important Message from Medicare             Contact Info       Orlando George MD   Specialty: Cardiology    [address]  [address]  [place] [address]   Phone: [phone]       Next Steps: Follow up in 1 week(s)    Instructions: Message sent to Dr. George office to contact patient to schedule follow up appointment within 1 week., They will call you to schedule first appointment.    Nima Bradshaw MD   Specialty: General Surgery, Surgery    [address]  [address]  [place] [address]   Phone: [phone]       Next Steps: Follow up in 1 week(s)    Instructions: Message sent to Dr. Bradshaw office to contact patient to schedule follow up appointment within 1 week., They will call you to schedule first appointment.          PCP appointment scheduled and added to AVS. Message sent to Dr. George and Dr. Ledbetter office to contact patient with a hospital follow up appointment for within 1 week.     secure chat nurse Cynthia that patient cleared from case management standpoint.

## 2023-09-30 NOTE — PLAN OF CARE
Problem: Adult Inpatient Plan of Care  Goal: Plan of Care Review  Outcome: Ongoing, Progressing  Goal: Patient-Specific Goal (Individualized)  Outcome: Ongoing, Progressing  Goal: Absence of Hospital-Acquired Illness or Injury  Outcome: Ongoing, Progressing  Goal: Optimal Comfort and Wellbeing  Outcome: Ongoing, Progressing  Goal: Readiness for Transition of Care  Outcome: Ongoing, Progressing     Problem: Adjustment to Illness (Sepsis/Septic Shock)  Goal: Optimal Coping  Outcome: Ongoing, Progressing     Problem: Bleeding (Sepsis/Septic Shock)  Goal: Absence of Bleeding  Outcome: Ongoing, Progressing     Problem: Glycemic Control Impaired (Sepsis/Septic Shock)  Goal: Blood Glucose Level Within Desired Range  Outcome: Ongoing, Progressing     Problem: Infection Progression (Sepsis/Septic Shock)  Goal: Absence of Infection Signs and Symptoms  Outcome: Ongoing, Progressing     Problem: Nutrition Impaired (Sepsis/Septic Shock)  Goal: Optimal Nutrition Intake  Outcome: Ongoing, Progressing     Problem: Impaired Wound Healing  Goal: Optimal Wound Healing  Outcome: Ongoing, Progressing     Problem: Dysrhythmia  Goal: Normalized Cardiac Rhythm  Outcome: Ongoing, Progressing

## 2023-09-30 NOTE — NURSING
Received order to discharge patient home.  Writer spoke with  patient regarding discharge instructions.  Patient verbalized  that he understand discharge  instructions. Writer informed virtual nurse of patient discharge so she could completed patient's  teaching.Saline lock removed. Tele monitoring removed.

## 2023-09-30 NOTE — PLAN OF CARE
Problem: Adult Inpatient Plan of Care  Goal: Plan of Care Review  Outcome: Adequate for Care Transition  Goal: Patient-Specific Goal (Individualized)  Outcome: Adequate for Care Transition  Goal: Absence of Hospital-Acquired Illness or Injury  Outcome: Adequate for Care Transition  Goal: Optimal Comfort and Wellbeing  Outcome: Adequate for Care Transition  Goal: Readiness for Transition of Care  Outcome: Adequate for Care Transition     Problem: Adjustment to Illness (Sepsis/Septic Shock)  Goal: Optimal Coping  Outcome: Adequate for Care Transition     Problem: Bleeding (Sepsis/Septic Shock)  Goal: Absence of Bleeding  Outcome: Adequate for Care Transition     Problem: Glycemic Control Impaired (Sepsis/Septic Shock)  Goal: Blood Glucose Level Within Desired Range  Outcome: Adequate for Care Transition     Problem: Infection Progression (Sepsis/Septic Shock)  Goal: Absence of Infection Signs and Symptoms  Outcome: Adequate for Care Transition     Problem: Nutrition Impaired (Sepsis/Septic Shock)  Goal: Optimal Nutrition Intake  Outcome: Adequate for Care Transition     Problem: Impaired Wound Healing  Goal: Optimal Wound Healing  Outcome: Adequate for Care Transition     Problem: Dysrhythmia  Goal: Normalized Cardiac Rhythm  Outcome: Adequate for Care Transition

## 2023-09-30 NOTE — PROGRESS NOTES
[facility] Medicine  Progress Note    Patient Name: Vivek Whitman  MRN: [id number]  Patient Class: IP- Inpatient   Admission Date: 9/26/2023  Length of Stay: 4 days  Attending Physician: Hudson Ayon MD  Primary Care Provider: Racheal, Primary Doctor        Subjective:     Principal Problem:Paroxysmal atrial fibrillation        HPI:  64 y/o M with PMHx significant for PAF on eliquis, HTN, recent diagnosis of Hep C presents to the ER complaining of abdominal pain.  Patinet reports RUQ abdominal pain that started last night.  Pain is intermittent.  Rates pain 9/10 in severity, worsening when driving over bumps on the road.  Also reports chronic dyspnea and BLE swelling.  Hx of ETOH abuse, but denies any recent use.  No alleviating or aggravating factors.  Denies any nausea, vomiting or changes in BM associated with abdominal pain.  He presented to ER where he was noted to be in rapid Afib.  He also spiked a low grade fever in ER.  Denies any other complaints.      Overview/Hospital Course:  64 y/o male with PAF presented with RUQ abdominal pain.  Noted to be in Afib with RVR and admitted to ICU on Cardizem drip.  Cardiology consulted. S/P DVCCV, converted to sinus.  RUQ US just showing gallstones.  Patient with slight leukocytosis and low grade fever in ER.  CT ordered showing possible acute cholecystitis.  Surgery consulted.surgery want patient be continued  monitor while is on IV Abx,surgery not convinced is really cholecystitis,patients pain is improving,tolerating diet.  Has staph on blood culture,may contaminated,blood culture is repeated,on Vanc.  BP is uncontrolled,added more BP med's  with prn clonidine.  Pain resolved. Will send home on 7 days of Augmentin. Follow up with surgery in one week. Activity as tolerated- refuses PT/OT eval. Low NA diet         Interval History: would like to go home       Review of Systems   Constitutional:  Negative for activity change.   HENT:  Negative for  congestion.    Respiratory:  Negative for chest tightness.    Genitourinary:  Negative for difficulty urinating.   Neurological:  Negative for dizziness.     Objective:     Vital Signs (Most Recent):  Temp: 98.3 °F (36.8 °C) (09/30/23 0732)  Pulse: 63 (09/30/23 0732)  Resp: 20 (09/30/23 0732)  BP: (!) 118/58 (09/30/23 0732)  SpO2: (!) 93 % (09/30/23 0732) Vital Signs (24h Range):  Temp:  [98 °F (36.7 °C)-98.9 °F (37.2 °C)] 98.3 °F (36.8 °C)  Pulse:  [58-71] 63  Resp:  [18-20] 20  SpO2:  [91 %-98 %] 93 %  BP: (118-155)/(58-90) 118/58     Weight: 95.2 kg (209 lb 14.1 oz)  Body mass index is 31.91 kg/m².    Intake/Output Summary (Last 24 hours) at 9/30/2023 0801  Last data filed at 9/29/2023 1700  Gross per 24 hour   Intake 480 ml   Output --   Net 480 ml         Physical Exam  Nursing note reviewed.   Constitutional:       Appearance: He is not ill-appearing.   Cardiovascular:      Rate and Rhythm: Normal rate and regular rhythm.   Pulmonary:      Effort: Pulmonary effort is normal. No respiratory distress.   Neurological:      Mental Status: He is alert. Mental status is at baseline.             Significant Labs: All pertinent labs within the past 24 hours have been reviewed.  BMP:   Recent Labs   Lab 09/30/23  0449   GLU 84      K 3.6      CO2 26   BUN 11   CREATININE 1.0   CALCIUM 8.9   MG 1.8     CBC:   Recent Labs   Lab 09/30/23  0449   WBC 6.90   HGB 11.9*   HCT 37.4*   *       Significant Imaging: I have reviewed all pertinent imaging results/findings within the past 24 hours.      Assessment/Plan:      * Paroxysmal atrial fibrillation  Patient with Paroxysmal (<7 days) atrial fibrillation which is uncontrolled currently with Beta Blocker. Patient is currently in atrial fibrillation.[id number] Score: 1. . Anticoagulation indicated. Anticoagulation done with Eliquis.  Noted to be in Afib with RVR on presentation.  Did not respond to Cardizem pushes and placed on Cardizem drip.  Cardiology  consulted  Resume home Metoprolol and Flecainide.  On Eliquis.  S/P successful cardioversion,sinus.    Positive blood culture      Has staph on blood culture,may contaminated,blood culture is repeated,on Vanc.      Calculus of gallbladder with acute cholecystitis without obstruction      RUQ US just showing gallstones.  Patient with slight leukocytosis and low grade fever in ER.  CT ordered showing possible acute cholecystitis.  Surgery consulted.surgery want patient be continued  monitor while is on IV Abx,surgery not convinced is really cholecystitis,patients pain is improving,tolerating diet.augmentin at DC time.      Benign essential hypertension  Resume home medications with parameters.  uncontrolled  added more BP med;s     Right upper quadrant abdominal pain  Main complaint on presentation.  RUQ US showing cholelithiasis/sludge, no secondary findings to suggest acute cholecystitis.  The common duct is upper limits of normal in caliber.  Patient then spiked low grade fever in ER. Does have slight leukocytosis.  Abdominal CT concerning for acute cholecystitis.  Started Zosyn and consult Surgery.  Change Eliquis to Lovenox in case any procedure needed.   Surgery consulted.surgery want patient be continued  monitor while is on IV Abx,patients pain is improving,tolerating diet.    Class 1 obesity with body mass index (BMI) of 31.0 to 31.9 in adult  Body mass index is 32.08 kg/m². Morbid obesity complicates all aspects of disease management from diagnostic modalities to treatment. Weight loss encouraged and health benefits explained to patient.         Tobacco abuse  Spent more than 3 minutes on smoking cessation counseling.  Provide nicotine patch.        VTE Risk Mitigation (From admission, onward)         Ordered     enoxaparin injection 100 mg  Every 12 hours         09/27/23 1422     IP VTE HIGH RISK PATIENT  Once         09/26/23 1646     Place sequential compression device  Until discontinued          09/26/23 1646                Discharge Planning   ELODIA:      Code Status: Full Code   Is the patient medically ready for discharge?:     Reason for patient still in hospital (select all that apply): Patient unstable  Discharge Plan A: Home with family                  Hudson Skaggs MD  Department of [facility] Medicine   [facility]

## 2023-09-30 NOTE — NURSING
[facility] [place] -- 4 Eyes      9/30/2023       Skin assessed on: Q Shift      [x] No Pressure Injuries Present    []Prevention Measures Documented    [] Yes LDA  for Pressure Injury Previously documented     [] Yes New Pressure Injury Discovered   [] LDA for New Pressure Injury Added      Attending RN:  Cynthia Bosch RN     Second RN:  Theodora Panchal RN

## 2023-09-30 NOTE — PLAN OF CARE
Problem: Adult Inpatient Plan of Care  Goal: Plan of Care Review  9/30/2023 1033 by Cynthia Bosch RN  Outcome: Adequate for Care Transition  9/30/2023 1000 by Cynthia Bosch RN  Outcome: Ongoing, Progressing  Goal: Patient-Specific Goal (Individualized)  9/30/2023 1033 by Cynthia Bosch RN  Outcome: Adequate for Care Transition  9/30/2023 1000 by Cynthia Bosch RN  Outcome: Ongoing, Progressing  Goal: Absence of Hospital-Acquired Illness or Injury  9/30/2023 1033 by Cynthia Bosch RN  Outcome: Adequate for Care Transition  9/30/2023 1000 by Cynthia Bosch RN  Outcome: Ongoing, Progressing  Goal: Optimal Comfort and Wellbeing  9/30/2023 1033 by Cynthia Bosch RN  Outcome: Adequate for Care Transition  9/30/2023 1000 by Cynthia Bosch RN  Outcome: Ongoing, Progressing  Goal: Readiness for Transition of Care  9/30/2023 1033 by Cynthia Bosch RN  Outcome: Adequate for Care Transition  9/30/2023 1000 by Cynthia Bosch RN  Outcome: Ongoing, Progressing     Problem: Adjustment to Illness (Sepsis/Septic Shock)  Goal: Optimal Coping  9/30/2023 1033 by Cynthia Bosch RN  Outcome: Adequate for Care Transition  9/30/2023 1000 by Cynthia Bosch RN  Outcome: Ongoing, Progressing     Problem: Bleeding (Sepsis/Septic Shock)  Goal: Absence of Bleeding  9/30/2023 1033 by Cynthia Bosch RN  Outcome: Adequate for Care Transition  9/30/2023 1000 by Cynthia Bosch RN  Outcome: Ongoing, Progressing     Problem: Glycemic Control Impaired (Sepsis/Septic Shock)  Goal: Blood Glucose Level Within Desired Range  9/30/2023 1033 by Cynthia Bosch RN  Outcome: Adequate for Care Transition  9/30/2023 1000 by Cynthia Bosch RN  Outcome: Ongoing, Progressing     Problem: Infection Progression (Sepsis/Septic Shock)  Goal: Absence of Infection Signs and Symptoms  9/30/2023 1033 by Cynthia Bosch RN  Outcome: Adequate for Care Transition  9/30/2023 1000 by Cynthia Bosch  RN  Outcome: Ongoing, Progressing     Problem: Nutrition Impaired (Sepsis/Septic Shock)  Goal: Optimal Nutrition Intake  9/30/2023 1033 by Cynthia Bosch RN  Outcome: Adequate for Care Transition  9/30/2023 1000 by Cynthia Bosch RN  Outcome: Ongoing, Progressing     Problem: Impaired Wound Healing  Goal: Optimal Wound Healing  9/30/2023 1033 by Cynthia Bosch RN  Outcome: Adequate for Care Transition  9/30/2023 1000 by Cynthia Bosch RN  Outcome: Ongoing, Progressing     Problem: Dysrhythmia  Goal: Normalized Cardiac Rhythm  9/30/2023 1033 by Cynthia Bosch RN  Outcome: Adequate for Care Transition  9/30/2023 1000 by Cynthia Bosch RN  Outcome: Ongoing, Progressing

## 2023-09-30 NOTE — ASSESSMENT & PLAN NOTE
Patient with Paroxysmal (<7 days) atrial fibrillation which is uncontrolled currently with Beta Blocker. Patient is currently in atrial fibrillation.RIIYM2XICv Score: 1. . Anticoagulation indicated. Anticoagulation done with Eliquis.  Noted to be in Afib with RVR on presentation.  Did not respond to Cardizem pushes and placed on Cardizem drip.  Cardiology consulted  Resume home Metoprolol and Flecainide.  On Eliquis.  S/P successful cardioversion,sinus.

## 2023-09-30 NOTE — SUBJECTIVE & OBJECTIVE
Interval History: would like to go home       Review of Systems   Constitutional:  Negative for activity change.   HENT:  Negative for congestion.    Respiratory:  Negative for chest tightness.    Genitourinary:  Negative for difficulty urinating.   Neurological:  Negative for dizziness.     Objective:     Vital Signs (Most Recent):  Temp: 98.3 °F (36.8 °C) (09/30/23 0732)  Pulse: 63 (09/30/23 0732)  Resp: 20 (09/30/23 0732)  BP: (!) 118/58 (09/30/23 0732)  SpO2: (!) 93 % (09/30/23 0732) Vital Signs (24h Range):  Temp:  [98 °F (36.7 °C)-98.9 °F (37.2 °C)] 98.3 °F (36.8 °C)  Pulse:  [58-71] 63  Resp:  [18-20] 20  SpO2:  [91 %-98 %] 93 %  BP: (118-155)/(58-90) 118/58     Weight: 95.2 kg (209 lb 14.1 oz)  Body mass index is 31.91 kg/m².    Intake/Output Summary (Last 24 hours) at 9/30/2023 0801  Last data filed at 9/29/2023 1700  Gross per 24 hour   Intake 480 ml   Output --   Net 480 ml         Physical Exam  Nursing note reviewed.   Constitutional:       Appearance: He is not ill-appearing.   Cardiovascular:      Rate and Rhythm: Normal rate and regular rhythm.   Pulmonary:      Effort: Pulmonary effort is normal. No respiratory distress.   Neurological:      Mental Status: He is alert. Mental status is at baseline.             Significant Labs: All pertinent labs within the past 24 hours have been reviewed.  BMP:   Recent Labs   Lab 09/30/23 0449   GLU 84      K 3.6      CO2 26   BUN 11   CREATININE 1.0   CALCIUM 8.9   MG 1.8     CBC:   Recent Labs   Lab 09/30/23 0449   WBC 6.90   HGB 11.9*   HCT 37.4*   *       Significant Imaging: I have reviewed all pertinent imaging results/findings within the past 24 hours.

## 2023-09-30 NOTE — DISCHARGE SUMMARY
[facility]  Discharge Summary      Patient Name: Vivek Whitman  MRN: [id number]  AZRA: [id number]  Patient Class: IP- Inpatient  Admission Date: 9/26/2023  Hospital Length of Stay: 4 days  Discharge Date and Time:  09/30/2023 8:05 AM  Attending Physician: Hudson Ayon MD   Discharging Provider: Hudson Ayon MD  Primary Care Provider: Racheal, Primary Doctor    Primary Care Team: Networked reference to record PCT     HPI:   64 y/o M with PMHx significant for PAF on eliquis, HTN, recent diagnosis of Hep C presents to the ER complaining of abdominal pain.  Patinet reports RUQ abdominal pain that started last night.  Pain is intermittent.  Rates pain 9/10 in severity, worsening when driving over bumps on the road.  Also reports chronic dyspnea and BLE swelling.  Hx of ETOH abuse, but denies any recent use.  No alleviating or aggravating factors.  Denies any nausea, vomiting or changes in BM associated with abdominal pain.  He presented to ER where he was noted to be in rapid Afib.  He also spiked a low grade fever in ER.  Denies any other complaints.      Procedure(s) (LRB):  Cardioversion or Defibrillation (N/A)  Cardioversion (N/A)      Hospital Course:   64 y/o male with PAF presented with RUQ abdominal pain.  Noted to be in Afib with RVR and admitted to ICU on Cardizem drip.  Cardiology consulted. S/P DVCCV, converted to sinus.  RUQ US just showing gallstones.  Patient with slight leukocytosis and low grade fever in ER.  CT ordered showing possible acute cholecystitis.  Surgery consulted.surgery want patient be continued  monitor while is on IV Abx,surgery not convinced is really cholecystitis,patients pain is improving,tolerating diet.  Has staph on blood culture,may contaminated,blood culture is repeated,on Vanc.  BP is uncontrolled,added more BP med's  with prn clonidine.  Pain resolved. Will send home on 7 days of Augmentin. Follow up with surgery in one week. Activity as  tolerated- refuses PT/OT eval. Low NA diet          Goals of Care Treatment Preferences:  Code Status: Full Code      Consults:   Consults (From admission, onward)        Status Ordering Provider     Pharmacy to dose Vancomycin consult  Once        Provider:  (Not yet assigned)   See [facility] for full Linked Orders Report.    Acknowledged CATAABELPOWER     Inpatient consult to General Surgery  Once        Provider:  Sky Garcia MD    Completed GISEL ALFREDO     Inpatient consult to Cardiology  Once        Provider:  Orlando George MD    Completed CHUCK MI          No new Assessment & Plan notes have been filed under this hospital service since the last note was generated.  Service: Hospital Medicine    Final Active Diagnoses:    Diagnosis Date Noted POA    PRINCIPAL PROBLEM:  Paroxysmal atrial fibrillation [I48.0] 08/07/2023 Yes    Positive blood culture [R78.81] 09/29/2023 Yes    Calculus of gallbladder with acute cholecystitis without obstruction [K80.00] 09/28/2023 Yes    Right upper quadrant abdominal pain [R10.11] 09/26/2023 Yes    Benign essential hypertension [I10] 09/26/2023 Yes     Chronic    Class 1 obesity with body mass index (BMI) of 31.0 to 31.9 in adult [E66.9, Z68.31] 08/07/2023 Not Applicable    Tobacco abuse [Z72.0] 03/13/2021 Yes      Problems Resolved During this Admission:       Discharged Condition: good    Disposition: Home or Self Care    Follow Up:   Follow-up Information     Orlando George MD Follow up in 1 week(s).    Specialty: Cardiology  Contact information:  [address]  [address]  [place] [id number] [phone]             Nima Bradshaw MD Follow up in 1 week(s).    Specialties: General Surgery, Surgery  Contact information:  [address]  [address]  [place] [id number] [phone]                       Patient Instructions:   No discharge procedures on file.    Significant Diagnostic Studies: N/A    Pending Diagnostic Studies:      Procedure Component Value Units Date/Time    EKG 12-lead [[id number]]     Order Status: Sent Lab Status: No result     VANCOMYCIN, TROUGH [[id number]] Collected: 09/30/23 0804    Order Status: Sent Lab Status: In process Updated: 09/30/23 0804    Specimen: Blood          Medications:  Reconciled Home Medications:      Medication List      START taking these medications    amLODIPine 10 MG tablet  Commonly known as: NORVASC  Take 1 tablet (10 mg total) by mouth once daily.     amoxicillin-clavulanate 875-125mg 875-125 mg per tablet  Commonly known as: AUGMENTIN  Take 1 tablet by mouth 2 (two) times daily. for 7 days     labetaloL 200 MG tablet  Commonly known as: NORMODYNE  Take 1 tablet (200 mg total) by mouth every 12 (twelve) hours.        CONTINUE taking these medications    allopurinoL 100 MG tablet  Commonly known as: ZYLOPRIM  Take 1 tablet (100 mg total) by mouth once daily.     aspirin 81 MG Chew  Take 1 tablet (81 mg total) by mouth once daily.     ELIQUIS 5 mg Tab  Generic drug: apixaban  Take 1 tablet (5 mg total) by mouth 2 (two) times daily.     famotidine 20 MG tablet  Commonly known as: PEPCID  Take 1 tablet (20 mg total) by mouth once daily.     flecainide 100 MG Tab  Commonly known as: TAMBOCOR  Take 1 tablet (100 mg total) by mouth every 12 (twelve) hours.     naloxone 1 mg/mL injection  Commonly known as: NARCAN  2 mg (1 mg per nostril) by Nasal route as needed for opioid overdose; may repeat in 3 to 5 minutes if not effective. Call 911     nicotine 7 mg/24 hr  Commonly known as: NICODERM CQ  Place 1 patch onto the skin once daily.     rosuvastatin 20 MG tablet  Commonly known as: CRESTOR  Take 20 mg by mouth once daily.        STOP taking these medications    metoprolol succinate 25 MG 24 hr tablet  Commonly known as: TOPROL-XL            Indwelling Lines/Drains at time of discharge:   Lines/Drains/Airways     None                 Time spent on the discharge of patient: > 35  minutes          Hudson Skaggs MD  Department of Hospital Medicine  [facility]

## 2023-10-02 ENCOUNTER — TELEPHONE (OUTPATIENT)
Dept: CARDIOLOGY | Facility: CLINIC | Age: 63
End: 2023-10-02
Payer: MEDICARE

## 2023-10-02 ENCOUNTER — TELEPHONE (OUTPATIENT)
Dept: SURGERY | Facility: CLINIC | Age: 63
End: 2023-10-02
Payer: MEDICARE

## 2023-10-02 NOTE — TELEPHONE ENCOUNTER
----- Message from Brent Cruz LCSW sent at 9/30/2023  9:04 AM CDT -----  Regarding: hospital follow up appointment  Good Morning, please contact patient to schedule a hospital follow up appointment for within 1 week.     Thanks  Brent

## 2023-10-02 NOTE — TELEPHONE ENCOUNTER
----- Message from Brent Cruz LCSW sent at 9/30/2023  9:03 AM CDT -----  Regarding: Hospital follow up  Good Morning, please contact patient for to schedule a hospital follow up for within 1 week.     Thanks  Brent

## 2023-10-02 NOTE — TELEPHONE ENCOUNTER
----- Message from Lucita Stafford sent at 10/2/2023 11:19 AM CDT -----  Name of Caller pt   Reason for Visit/Symptoms pt requesting to be seen for hosp f/u. Nothing available. Call pt   Best Contact Number or Confirm if Padminit Preferred [phone] (home)     Preferred Date/Time of Appointment asap   Interested in Virtual Visit (yes/no)  Additional Information

## 2023-10-03 ENCOUNTER — TELEPHONE (OUTPATIENT)
Dept: ADMINISTRATIVE | Facility: CLINIC | Age: 63
End: 2023-10-03
Payer: MEDICARE

## 2023-10-03 ENCOUNTER — PATIENT MESSAGE (OUTPATIENT)
Dept: ADMINISTRATIVE | Facility: CLINIC | Age: 63
End: 2023-10-03
Payer: MEDICARE

## 2023-10-03 ENCOUNTER — PATIENT OUTREACH (OUTPATIENT)
Dept: ADMINISTRATIVE | Facility: CLINIC | Age: 63
End: 2023-10-03
Payer: MEDICARE

## 2023-10-03 LAB
BACTERIA BLD CULT: NORMAL
BACTERIA BLD CULT: NORMAL

## 2023-10-03 NOTE — PHYSICIAN QUERY
PT Name: Vivek Whitman  MR #: [id number]     Documentation Clarification      CDS/: Mable Albert rn             Contact information:[email or web address]    This form is a permanent document in the medical record.     Query Date: October 2, 2023    By submitting this query, we are merely seeking further clarification of documentation. Please utilize your independent clinical judgment when addressing the question(s) below.    The Medical Record reflects the following:    Clinical Findings Location in Medical Records   Presented with RUQ abdominal pain.      RUQ US just showing gallstones.  Patient with slight leukocytosis and low grade fever in ER.  CT ordered showing possible acute cholecystitis.  Surgery consulted.surgery want patient be continued  monitor while is on IV Abx,surgery not convinced is really cholecystitis,patients pain is improving,tolerating diet.   Dc summary   RUQ US showing cholelithiasis/sludge, no secondary findings to suggest acute cholecystitis.  The common duct is upper limits of normal in caliber.  Patient then spiked low grade fever in ER. Does have slight leukocytosis.  Abdominal CT concerning for acute cholecystitis.  Started Zosyn and consult Surgery. HM note 9-30     Due to the conflicting clinical picture, please clinically validate the diagnosis of acute cholecystitis.    If validated, please provide additional clinical support for the diagnosis.       [  x ] Above stated diagnosis is not confirmed and/or it has been ruled out   [   ] Above stated diagnosis is not confirmed and/or it has been ruled out, other diagnosis ruled in (please          specify):_______________     [   ] Above stated diagnosis is confirmed. Please specify clinical support (signs, symptoms, and treatment) for  the confirmed diagnosis: ____________________     [   ] Other clarification (please specify): ___________________     Form No. [id number]

## 2023-10-03 NOTE — PROGRESS NOTES
"Phoned patient in response to reply of "2" to post-discharge texting tracker. Mr. Whitman states that he has not received all medications prescribed at discharge. Per review, 3 medications were prescribed. Mr. Whitman states he got 1 right away but still have not received 2 of them. He states he got a call that one was ready, but did not know why the 3rd one had not yet been filled. Phoned [facility] and the pharmacist stated that they were OOS of the labetalol and it had to be ordered. She states that it arrived in today's order and once it was unpacked, the RX would be filled and the patient would be contacted once it is ready. Phoned Mr. Whitman to let him know that his 2nd RX was ready and the third one, the labetalol, was in today's order and would be filled today. He verbalized understanding.    "

## 2023-10-03 NOTE — PROGRESS NOTES
C3 nurse attempted to contact Vivek Whitman  for a TCC post hospital discharge follow up call. No answer. The patient does not have a scheduled HOSFU appointment, and the pt does not have an [facility] PCP.    Message sent to PCP staff

## 2023-10-04 NOTE — PROGRESS NOTES
C3 nurse spoke with Vivek Whitman  for a TCC post hospital discharge follow up call. The patient has a scheduled HOSFU appointment with CECIL Salcedo on 10/18/2023 @ 1000.

## 2023-10-09 ENCOUNTER — OFFICE VISIT (OUTPATIENT)
Dept: SURGERY | Facility: CLINIC | Age: 63
End: 2023-10-09
Payer: MEDICARE

## 2023-10-09 VITALS
BODY MASS INDEX: 31.3 KG/M2 | DIASTOLIC BLOOD PRESSURE: 72 MMHG | SYSTOLIC BLOOD PRESSURE: 120 MMHG | HEIGHT: 68 IN | RESPIRATION RATE: 18 BRPM | OXYGEN SATURATION: 97 % | HEART RATE: 57 BPM | WEIGHT: 206.56 LBS

## 2023-10-09 DIAGNOSIS — K81.9 CHOLECYSTITIS: Primary | ICD-10-CM

## 2023-10-09 PROCEDURE — 99999 PR PBB SHADOW E&M-EST. PATIENT-LVL III: ICD-10-PCS | Mod: PBBFAC,,, | Performed by: SURGERY

## 2023-10-09 PROCEDURE — 99213 OFFICE O/P EST LOW 20 MIN: CPT | Mod: PBBFAC | Performed by: SURGERY

## 2023-10-09 PROCEDURE — 99213 PR OFFICE/OUTPT VISIT, EST, LEVL III, 20-29 MIN: ICD-10-PCS | Mod: S$PBB,,, | Performed by: SURGERY

## 2023-10-09 PROCEDURE — 99999 PR PBB SHADOW E&M-EST. PATIENT-LVL III: CPT | Mod: PBBFAC,,, | Performed by: SURGERY

## 2023-10-09 PROCEDURE — 99213 OFFICE O/P EST LOW 20 MIN: CPT | Mod: S$PBB,,, | Performed by: SURGERY

## 2023-10-09 NOTE — PROGRESS NOTES
62 y/o man with history of RUQ abd pain which was from either acute hepatitis vs cholecystitis, his symptoms have resolved and he no longer has any pain/    PE: abd benign    Impression: abd pain now resolved     Plan: return to clinic or ED if pain returns.

## 2023-11-13 PROBLEM — A41.9 SEPSIS: Status: RESOLVED | Noted: 2023-08-08 | Resolved: 2023-11-13

## 2023-12-26 ENCOUNTER — TELEPHONE (OUTPATIENT)
Dept: TRANSPLANT | Facility: CLINIC | Age: 63
End: 2023-12-26
Payer: MEDICARE

## 2023-12-26 PROBLEM — E78.5 HYPERLIPIDEMIA: Status: ACTIVE | Noted: 2023-08-23

## 2023-12-26 PROBLEM — N20.0 KIDNEY STONE: Chronic | Status: ACTIVE | Noted: 2023-08-23

## 2023-12-26 PROBLEM — I48.0 PAROXYSMAL ATRIAL FIBRILLATION: Chronic | Status: ACTIVE | Noted: 2023-08-07

## 2023-12-26 PROBLEM — M10.9 GOUT: Status: ACTIVE | Noted: 2023-08-23

## 2023-12-26 PROBLEM — I12.9 HYPERTENSIVE RENAL DISEASE: Status: ACTIVE | Noted: 2023-08-23

## 2023-12-26 NOTE — TELEPHONE ENCOUNTER
----- Message from Emelia Gates sent at 12/26/2023 12:51 PM CST -----    Hepatology referral received and scanned into media; pt chart sent to referral nurse for medical review.     Recs also in [facility] and care everywhere    Referring Provider: Gavino Weber NP  Phone: [phone]  Fax: [phone]  .

## 2023-12-29 ENCOUNTER — TELEPHONE (OUTPATIENT)
Dept: TRANSPLANT | Facility: CLINIC | Age: 63
End: 2023-12-29
Payer: MEDICARE

## 2023-12-29 NOTE — TELEPHONE ENCOUNTER
Pt records reviewed.  Pt will be referred to Hepatology due to HEP C FOR TREATMENT> pt was referred in past this year and hung up on staff.   Initial referral received  from Referring Provider: Gavino Weber NP   Phone: [phone]   Fax: [phone]   Referral letter sent to patient.      RECORDS SCANNED IN MEDIA UNDER HEPATOLOGY REFERRAL .

## 2023-12-29 NOTE — LETTER
December 29, 2023    Vivek Whitman  [address] [address]      Dear Vivek Whitman:    Your doctor has referred you to the [facility]. We are sending this letter to remind you to make an appointment with us to complete the referral process.     Please call us at [phone] to schedule an appointment. We look forward to seeing you soon.     If you received a call and have been scheduled, please disregard this letter.       Sincerely,        [facility]   [address] [place] [id number]  [phone]

## 2023-12-29 NOTE — TELEPHONE ENCOUNTER
----- Message from Emelia Gates sent at 12/26/2023 12:51 PM CST -----    Hepatology referral received and scanned into media; pt chart sent to referral nurse for medical review.     Recs also in [facility] and care everywhere    Referring Provider: Gavino Weber NP  Phone: [phone]  Fax: [phone]  .

## 2024-01-02 ENCOUNTER — TELEPHONE (OUTPATIENT)
Dept: HEPATOLOGY | Facility: CLINIC | Age: 64
End: 2024-01-02
Payer: MEDICARE

## 2024-01-02 NOTE — TELEPHONE ENCOUNTER
Attempt made to reach patient to setup consult visit with PA Scheuermann.  I lvm and sent letter asking that he contact hepatology for scheduling.

## 2024-01-02 NOTE — TELEPHONE ENCOUNTER
----- Message from Beverley Vaca sent at 12/29/2023  4:02 PM CST -----  Regarding: REFERRED AGAIN. old referral still in.  Pt records reviewed.  Pt will be referred to Hepatology due to HEP C FOR TREATMENT> pt was referred in past this year and hung up on staff.   Initial referral received  from Referring Provider: Gavino Weber NP   Phone: [phone]   Fax: [phone]   Referral letter sent to patient.      RECORDS SCANNED IN MEDIA UNDER HEPATOLOGY REFERRAL .

## 2024-01-29 NOTE — TELEPHONE ENCOUNTER
Patient called back.  He states that he has an appt with [facility] this week.  He asked that support clinicals be faxed to their office for review (fax # [phone]); done.

## 2024-01-29 NOTE — TELEPHONE ENCOUNTER
Patient called our office.  He did not want to make an in person or virtual visit with PA Scheuermann.  He states that transportation is an issue and he is not computer savvy.  He states that he will call [facility] to see if his insurance is accepted there and possibly make an appt to discuss treatment for hep c.

## 2024-04-01 PROBLEM — N17.9 ACUTE KIDNEY FAILURE: Status: RESOLVED | Noted: 2023-08-06 | Resolved: 2024-04-01

## 2024-04-24 DIAGNOSIS — I10 BENIGN ESSENTIAL HYPERTENSION: Chronic | ICD-10-CM

## 2024-04-24 DIAGNOSIS — I48.0 PAROXYSMAL ATRIAL FIBRILLATION: Primary | ICD-10-CM

## 2024-04-24 RX ORDER — LABETALOL 200 MG/1
200 TABLET, FILM COATED ORAL EVERY 12 HOURS
Qty: 60 TABLET | Refills: 5 | Status: SHIPPED | OUTPATIENT
Start: 2024-04-24 | End: 2025-04-24

## 2024-04-24 NOTE — PROGRESS NOTES
Pt called for refills on labetalol. Medication reordered. Pt encouraged to keep f/u appt, pt verb understanding.

## 2024-05-10 DIAGNOSIS — M10.00 IDIOPATHIC GOUT, UNSPECIFIED CHRONICITY, UNSPECIFIED SITE: ICD-10-CM

## 2024-05-10 DIAGNOSIS — I48.0 PAROXYSMAL ATRIAL FIBRILLATION: Chronic | ICD-10-CM

## 2024-05-10 RX ORDER — ALLOPURINOL 100 MG/1
100 TABLET ORAL DAILY
Qty: 30 TABLET | Refills: 1 | Status: SHIPPED | OUTPATIENT
Start: 2024-05-10

## 2024-08-19 DIAGNOSIS — M10.00 IDIOPATHIC GOUT, UNSPECIFIED CHRONICITY, UNSPECIFIED SITE: ICD-10-CM

## 2024-08-19 RX ORDER — ALLOPURINOL 100 MG/1
100 TABLET ORAL DAILY
Qty: 30 TABLET | Refills: 1 | Status: SHIPPED | OUTPATIENT
Start: 2024-08-19

## 2024-12-11 DIAGNOSIS — I10 BENIGN ESSENTIAL HYPERTENSION: ICD-10-CM

## (undated) DEVICE — PAD DEFIB CADENCE ADULT R2